# Patient Record
Sex: FEMALE | Race: WHITE | NOT HISPANIC OR LATINO | Employment: FULL TIME | URBAN - METROPOLITAN AREA
[De-identification: names, ages, dates, MRNs, and addresses within clinical notes are randomized per-mention and may not be internally consistent; named-entity substitution may affect disease eponyms.]

---

## 2018-08-21 ENCOUNTER — TRANSCRIBE ORDERS (OUTPATIENT)
Dept: LAB | Facility: HOSPITAL | Age: 23
End: 2018-08-21

## 2018-08-21 ENCOUNTER — LAB (OUTPATIENT)
Dept: LAB | Facility: HOSPITAL | Age: 23
End: 2018-08-21

## 2018-08-21 DIAGNOSIS — N93.9 ABNORMAL UTERINE BLEEDING (AUB): ICD-10-CM

## 2018-08-21 DIAGNOSIS — N93.9 ABNORMAL UTERINE BLEEDING (AUB): Primary | ICD-10-CM

## 2018-08-21 LAB
DEPRECATED RDW RBC AUTO: 42.6 FL (ref 37–54)
ERYTHROCYTE [DISTWIDTH] IN BLOOD BY AUTOMATED COUNT: 14.4 % (ref 11.3–14.5)
HCT VFR BLD AUTO: 38 % (ref 34.5–44)
HGB BLD-MCNC: 11.7 G/DL (ref 11.5–15.5)
MCH RBC QN AUTO: 24.7 PG (ref 27–31)
MCHC RBC AUTO-ENTMCNC: 30.8 G/DL (ref 32–36)
MCV RBC AUTO: 80.3 FL (ref 80–99)
PLATELET # BLD AUTO: 267 10*3/MM3 (ref 150–450)
PMV BLD AUTO: 12.3 FL (ref 6–12)
RBC # BLD AUTO: 4.73 10*6/MM3 (ref 3.89–5.14)
T4 FREE SERPL-MCNC: 0.97 NG/DL (ref 0.89–1.76)
TSH SERPL DL<=0.05 MIU/L-ACNC: 1.07 MIU/ML (ref 0.35–5.35)
WBC NRBC COR # BLD: 8.86 10*3/MM3 (ref 3.5–10.8)

## 2018-08-21 PROCEDURE — 84443 ASSAY THYROID STIM HORMONE: CPT

## 2018-08-21 PROCEDURE — 85027 COMPLETE CBC AUTOMATED: CPT | Performed by: PHYSICIAN ASSISTANT

## 2018-08-21 PROCEDURE — 36415 COLL VENOUS BLD VENIPUNCTURE: CPT | Performed by: PHYSICIAN ASSISTANT

## 2018-08-21 PROCEDURE — 84439 ASSAY OF FREE THYROXINE: CPT | Performed by: PHYSICIAN ASSISTANT

## 2018-10-03 ENCOUNTER — TELEPHONE (OUTPATIENT)
Dept: URGENT CARE | Facility: CLINIC | Age: 23
End: 2018-10-03

## 2018-10-22 ENCOUNTER — OFFICE VISIT (OUTPATIENT)
Dept: NEUROLOGY | Facility: CLINIC | Age: 23
End: 2018-10-22

## 2018-10-22 VITALS
DIASTOLIC BLOOD PRESSURE: 84 MMHG | HEIGHT: 63 IN | SYSTOLIC BLOOD PRESSURE: 127 MMHG | BODY MASS INDEX: 30.12 KG/M2 | WEIGHT: 170 LBS

## 2018-10-22 DIAGNOSIS — G43.019 INTRACTABLE MIGRAINE WITHOUT AURA AND WITHOUT STATUS MIGRAINOSUS: Primary | ICD-10-CM

## 2018-10-22 PROCEDURE — 99204 OFFICE O/P NEW MOD 45 MIN: CPT | Performed by: PSYCHIATRY & NEUROLOGY

## 2018-10-22 RX ORDER — SUMATRIPTAN 100 MG/1
TABLET, FILM COATED ORAL
Qty: 9 TABLET | Refills: 1 | Status: SHIPPED | OUTPATIENT
Start: 2018-10-22 | End: 2019-05-13 | Stop reason: SDUPTHER

## 2018-10-22 RX ORDER — VENLAFAXINE HYDROCHLORIDE 150 MG/1
CAPSULE, EXTENDED RELEASE ORAL
COMMUNITY
Start: 2018-09-07 | End: 2021-01-04

## 2018-10-22 RX ORDER — FLUTICASONE PROPIONATE 50 MCG
2 SPRAY, SUSPENSION (ML) NASAL DAILY
COMMUNITY
End: 2021-09-08 | Stop reason: SDUPTHER

## 2018-10-22 RX ORDER — ERGOCALCIFEROL (VITAMIN D2) 10 MCG
400 TABLET ORAL DAILY
COMMUNITY
End: 2021-06-23

## 2018-10-22 NOTE — PROGRESS NOTES
Subjective:    CC: Elisabeth Martinez is seen today in consultation at the request of Berry Maria,* for Migraine       HPI:  23-year-old female referred to the clinic for evaluation and management of migraines.  She reports that she started having headaches in January 2018 but in last 2 months, they've become more intense and frequent.  She reports that the headache starts in left frontotemporal region and around the eye or behind the eye on the left associated with light and sound sensitivity as well as nausea and vomiting.  She describes pain as a sharp shooting type of pain with maximum pain intensity being 6-7 out of 10.  Currently she is reporting approximately 10-12 headache days in a month.  She was taking Excedrin as needed and it was helping as an abortive therapy but it seems to have stopped working now.  She reports poor quality of sleep.  She is taking Effexor for mood and it helps.  She denies any specific triggers and denies aura prior to her headaches.  She hasn't had brain imaging done since onset of headaches in January 2018.  There is a family history of MS in her mother.    The following portions of the patient's history were reviewed today and updated as of 10/22/2018  : allergies, social history and problem list.  This document will be scanned to patient's chart.      Current Outpatient Prescriptions:   •  cetirizine (zyrTEC) 10 MG tablet, Take 10 mg by mouth Daily., Disp: , Rfl:   •  fluticasone (FLONASE) 50 MCG/ACT nasal spray, 2 sprays into the nostril(s) as directed by provider Daily., Disp: , Rfl:   •  metFORMIN ER (GLUCOPHAGE-XR) 750 MG 24 hr tablet, Take 750 mg by mouth Daily With Breakfast., Disp: , Rfl:   •  montelukast (SINGULAIR) 10 MG tablet, Take 10 mg by mouth Every Night., Disp: , Rfl:   •  venlafaxine XR (EFFEXOR-XR) 150 MG 24 hr capsule, , Disp: , Rfl:   •  Vitamin D, Cholecalciferol, (CHOLECALCIFEROL) 400 units tablet, Take 400 Units by mouth Daily., Disp: , Rfl:   •   "Erenumab-aooe (AIMOVIG) 70 MG/ML prefilled syringe, Inject 2 mL under the skin into the appropriate area as directed Every 30 (Thirty) Days., Disp: 2 mL, Rfl: 12  •  SUMAtriptan (IMITREX) 100 MG tablet, Take one tablet at onset of headache. May repeat dose one time in 2 hours if headache not relieved. ., Disp: 9 tablet, Rfl: 1   Past Medical History:   Diagnosis Date   • Anxiety    • Migraine    • PCOS (polycystic ovarian syndrome)       No past surgical history on file.   Family History   Problem Relation Age of Onset   • Seizures Mother    • Heart disease Maternal Grandmother    • Stroke Maternal Grandmother    • Cancer Maternal Grandfather    • Diabetes Maternal Grandfather       Review of Systems   Constitutional: Positive for fatigue.   Eyes: Positive for photophobia.   Respiratory: Positive for shortness of breath.    Cardiovascular: Negative.    Endocrine: Positive for heat intolerance.   Neurological: Positive for dizziness, weakness and headache.   Psychiatric/Behavioral: Positive for decreased concentration and sleep disturbance. The patient is nervous/anxious.        All other systems reviewed and are negative     Objective:    /84   Ht 160 cm (62.99\")   Wt 77.1 kg (170 lb)   BMI 30.12 kg/m²     Neurology Exam:    General apperance: NAD.     Mental status: Alert, awake and oriented to time place and person.    Recent and Remote memory: Can recall 3/3 objects at 5 minutes. Can recall historical events.     Attention span and Concentration: Serial 7s: Normal.     Fund of knowledge:  Normal.     Language and Speech: No aphasia or dysarthria.    Naming , Repitition and Comprehension:  Can name objects, repeat a sentence and follow commands. Speech is clear and fluent with good repetition, comprehension, and naming.    Cranial Nerves:   CN II: Visual fields are full. Intact. Fundi - Normal, No papillederma, Pupils - DENA  CN III, IV and VI: Extraocular movements are intact. Normal saccades.   CN V: " Facial sensation is intact.   CN VII: Muscles of facial expression reveal no asymmetry. Intact.   CN VIII: Hearing is intact. Whispered voice intact.   CN IX and X: Palate elevates symmetrically. Intact  CN XI: Shoulder shrug is intact.   CN XII: Tongue is midline without evidence of atrophy or fasciculation.     Motor:  Right UE muscle strength 5/5. Normal tone.     Left UE muscle strength 5/5. Normal tone.      Right LE muscle strength5/5. Normal tone.     Left LE muscle strength 5/5. Normal tone.      Sensory: Normal light touch, vibration and pinprick sensation bilaterally.    DTRs: 3+ bilaterally in upper and lower extremities.    Babinski: Negative bilaterally.    Co-ordination: Normal finger-to-nose, heel to shin B/L.    Rhomberg: Negative.    Gait: Normal.    Cardiovascular: Regular rate and rhythm without murmur, gallop or rub.    Assessment and Plan:  1. Intractable migraine without aura and without status migrainosus  She is currently reporting 10-12 headache days in a month.  Will initiate Aimovig 140 mg subcutaneous tenderness injections every month as a preventative therapy.  Imitrex 100 mg as needed as an abortive therapy.  MRI brain for further evaluation.  I also advised her to start taking melatonin 10 mg 1-2 hours before bedtime to see if it helps improve sleep quality or not.  I'll see her back in 4 weeks in follow-up.  - MRI Brain Without Contrast; Future       No Follow-up on file.     Jose Zaragoza MD

## 2018-11-12 ENCOUNTER — APPOINTMENT (OUTPATIENT)
Dept: MRI IMAGING | Facility: HOSPITAL | Age: 23
End: 2018-11-12
Attending: PSYCHIATRY & NEUROLOGY

## 2018-12-04 ENCOUNTER — TELEPHONE (OUTPATIENT)
Dept: NEUROLOGY | Facility: CLINIC | Age: 23
End: 2018-12-04

## 2018-12-04 NOTE — TELEPHONE ENCOUNTER
Called to notify patient her Aimovig approval letter came today. Phone continued to ring. No answer.

## 2019-03-29 ENCOUNTER — TELEPHONE (OUTPATIENT)
Dept: NEUROLOGY | Facility: CLINIC | Age: 24
End: 2019-03-29

## 2019-03-29 NOTE — TELEPHONE ENCOUNTER
Left voicemail for pt to call back.    -Pt needs to setup follow-up appointment because Aimovig was denied because we have no information on how she is doing on medication.

## 2019-05-13 ENCOUNTER — OFFICE VISIT (OUTPATIENT)
Dept: NEUROLOGY | Facility: CLINIC | Age: 24
End: 2019-05-13

## 2019-05-13 VITALS
WEIGHT: 174 LBS | SYSTOLIC BLOOD PRESSURE: 122 MMHG | BODY MASS INDEX: 30.83 KG/M2 | HEART RATE: 106 BPM | DIASTOLIC BLOOD PRESSURE: 76 MMHG | RESPIRATION RATE: 16 BRPM | HEIGHT: 63 IN | OXYGEN SATURATION: 98 %

## 2019-05-13 DIAGNOSIS — G43.019 INTRACTABLE MIGRAINE WITHOUT AURA AND WITHOUT STATUS MIGRAINOSUS: Primary | ICD-10-CM

## 2019-05-13 PROCEDURE — 99214 OFFICE O/P EST MOD 30 MIN: CPT | Performed by: PSYCHIATRY & NEUROLOGY

## 2019-05-13 RX ORDER — SUMATRIPTAN 100 MG/1
TABLET, FILM COATED ORAL
Qty: 9 TABLET | Refills: 3 | Status: SHIPPED | OUTPATIENT
Start: 2019-05-13 | End: 2019-07-10 | Stop reason: SDUPTHER

## 2019-05-13 NOTE — PROGRESS NOTES
Subjective:    CC: Elisabeth Martinez is in clinic today for follow up for migraines.    HPI:  She is in clinic for follow-up after initial visit back in October 2018.  At that time, Aimovig 140 mg subcutaneous injection was started as a preventative therapy for migraines.  She reports that with Aimovig, she did extremely well and headache intensity and frequency went down significantly.  She was experiencing approximately 10-15 headache days in a month and with Aimovig, it went down to 1 or sometimes 0 headaches in a month.  She tolerated Aimovig very well.  Her last Aimovig was on April 5, 2019 after which insurance stopped covering the medication as she did not come for neurology follow-up as per schedule.  She is requesting that Aimovig is restarted as her headaches are becoming worse.    The following portions of the patient's history were reviewed and updated as of 05/13/2019: allergies, social history and problem list.       Current Outpatient Medications:   •  cetirizine (zyrTEC) 10 MG tablet, Take 10 mg by mouth Daily., Disp: , Rfl:   •  fluticasone (FLONASE) 50 MCG/ACT nasal spray, 2 sprays into the nostril(s) as directed by provider Daily., Disp: , Rfl:   •  metFORMIN ER (GLUCOPHAGE-XR) 750 MG 24 hr tablet, Take 750 mg by mouth Daily With Breakfast., Disp: , Rfl:   •  montelukast (SINGULAIR) 10 MG tablet, Take 10 mg by mouth Every Night., Disp: , Rfl:   •  SUMAtriptan (IMITREX) 100 MG tablet, Take one tablet at onset of headache. May repeat dose one time in 2 hours if headache not relieved. ., Disp: 9 tablet, Rfl: 1  •  venlafaxine XR (EFFEXOR-XR) 150 MG 24 hr capsule, , Disp: , Rfl:   •  Vitamin D, Cholecalciferol, (CHOLECALCIFEROL) 400 units tablet, Take 400 Units by mouth Daily., Disp: , Rfl:   •  Erenumab-aooe 140 MG/ML solution auto-injector, Inject 1 mL under the skin into the appropriate area as directed Every 30 (Thirty) Days., Disp: 1.12 mL, Rfl: 11   Past Medical History:   Diagnosis Date   • Anxiety   "  • Migraine    • PCOS (polycystic ovarian syndrome)       No past surgical history on file.   Family History   Problem Relation Age of Onset   • Seizures Mother    • Heart disease Maternal Grandmother    • Stroke Maternal Grandmother    • Cancer Maternal Grandfather    • Diabetes Maternal Grandfather         Review of Systems   Neurological: Positive for headache.     Objective:    /76   Pulse 106   Resp 16   Ht 160 cm (62.99\")   Wt 78.9 kg (174 lb)   SpO2 98%   BMI 30.83 kg/m²     Neurology Exam:  General apperance: NAD.     Mental status: Alert, awake and oriented to time place and person.    Recent and Remote memory: Can recall 3/3 objects at 5 minutes. Can recall historical events.     Attention span and Concentration: Serial 7s: Normal.     Fund of knowledge:  Normal.     Language and Speech: No aphasia or dysarthria.    Naming , Repitition and Comprehension:  Can name objects, repeat a sentence and follow commands. Speech is clear and fluent with good repetition, comprehension, and naming.    CN II to XII: Intact.    Opthalmoscopic Exam: No papilledema.    Motor:  Right UE muscle strength 5/5. Normal tone.     Left UE muscle strength 5/5. Normal tone.      Right LE muscle strength5/5. Normal tone.     Left LE muscle strength 5/5. Normal tone.      Sensory: Normal light touch, vibration and pinprick sensation bilaterally.    DTRs: 2+ bilaterally.    Babinski: Negative bilaterally.    Co-ordination: Normal finger-to-nose, heel to shin B/L.    Rhomberg: Negative.    Gait: Normal.    Cardiovascular: Regular rate and rhythm without murmur, gallop or rub.    Assessment and Plan:  1. Intractable migraine without aura and without status migrainosus  She responded to very well to [id number] mg subcutaneous injections.  She was taking monthly injections until April after which insurance stopped covering the medication as she missed her neurology appointment.  She has done extremely well on Aimovig and it is " medically necessary that she continues this medication for migraine prevention.  Prior to Aimovig, she was getting 10-15 headaches in a month.  Since starting Aimovig, frequency has gone down to one headache in a month.  I will be sending a prescription for Aimovig 140 mg subcutaneous injection and will see her back in 6 to 8 weeks for follow-up.  Continue with sumatriptan 100 mg as needed as an abortive therapy as it has helped.    I spent 25 minutes face to face with the patient and spent 15  minutes of this time  in management, instructions and education regarding above mentioned diagnosis and also on counseling and discussing about taking medication regularly, possible side effects with medication use, importance of good sleep hygiene, good hydration and regular exercise.    Return in about 8 weeks (around 7/8/2019).

## 2019-05-14 ENCOUNTER — DOCUMENTATION (OUTPATIENT)
Dept: NEUROLOGY | Facility: CLINIC | Age: 24
End: 2019-05-14

## 2019-06-19 ENCOUNTER — TELEPHONE (OUTPATIENT)
Dept: NEUROLOGY | Facility: CLINIC | Age: 24
End: 2019-06-19

## 2019-06-19 NOTE — TELEPHONE ENCOUNTER
Mom, Karey called stating Elisabeth never had her MRI Brain w/o contrast and now it has . She is wondering if  could put in a new order for this when he gets a chance/gets back.    ,  Could you please put in a new MRI Brain w/o contrast for Elsiabeth? As well mother would like for you to be on the lookout for anything that might indicate MS on her scans once she has had this done as Karey has MS herself and states sometimes feels Elisabeth shows similar symptoms.

## 2019-06-28 DIAGNOSIS — G43.019 INTRACTABLE MIGRAINE WITHOUT AURA AND WITHOUT STATUS MIGRAINOSUS: Primary | ICD-10-CM

## 2019-07-02 ENCOUNTER — TELEPHONE (OUTPATIENT)
Dept: NEUROLOGY | Facility: CLINIC | Age: 24
End: 2019-07-02

## 2019-07-02 NOTE — TELEPHONE ENCOUNTER
Pt mother calling inquiring about why MRI has not been scheduled. Advised new orders were put in on Friday and CS has called pt twice to schedule and left voicemail b/c there was no answer. Transferred mother to CS.

## 2019-07-10 ENCOUNTER — OFFICE VISIT (OUTPATIENT)
Dept: NEUROLOGY | Facility: CLINIC | Age: 24
End: 2019-07-10

## 2019-07-10 VITALS
DIASTOLIC BLOOD PRESSURE: 78 MMHG | BODY MASS INDEX: 30.83 KG/M2 | HEIGHT: 63 IN | SYSTOLIC BLOOD PRESSURE: 124 MMHG | WEIGHT: 174 LBS

## 2019-07-10 DIAGNOSIS — G43.019 INTRACTABLE MIGRAINE WITHOUT AURA AND WITHOUT STATUS MIGRAINOSUS: Primary | ICD-10-CM

## 2019-07-10 PROCEDURE — 99213 OFFICE O/P EST LOW 20 MIN: CPT | Performed by: PSYCHIATRY & NEUROLOGY

## 2019-07-10 RX ORDER — SUMATRIPTAN 100 MG/1
TABLET, FILM COATED ORAL
Qty: 9 TABLET | Refills: 3 | Status: SHIPPED | OUTPATIENT
Start: 2019-07-10 | End: 2019-08-21 | Stop reason: SDUPTHER

## 2019-07-10 NOTE — PROGRESS NOTES
Subjective:    CC: Elisabeth Martinez is in clinic today for follow up for chronic migraines.    HPI:  She is in clinic for regular follow-up.  Since her last visit, her insurance did approve Aimovig 140 mg subcutaneous injection.  She has done total of 2 injections since her last visit and reports that she is doing really well.  She has had only one headache in the last 2 months for recheck to take sumatriptan.  She is tolerating Aimovig well without any side effects.    The following portions of the patient's history were reviewed and updated as of 07/10/2019: allergies, social history and problem list.       Current Outpatient Medications:   •  cetirizine (zyrTEC) 10 MG tablet, Take 10 mg by mouth Daily., Disp: , Rfl:   •  Erenumab-aooe 140 MG/ML solution auto-injector, Inject 1 mL under the skin into the appropriate area as directed Every 30 (Thirty) Days., Disp: 1.12 mL, Rfl: 11  •  fluticasone (FLONASE) 50 MCG/ACT nasal spray, 2 sprays into the nostril(s) as directed by provider Daily., Disp: , Rfl:   •  metFORMIN ER (GLUCOPHAGE-XR) 750 MG 24 hr tablet, Take 750 mg by mouth Daily With Breakfast., Disp: , Rfl:   •  montelukast (SINGULAIR) 10 MG tablet, Take 10 mg by mouth Every Night., Disp: , Rfl:   •  SUMAtriptan (IMITREX) 100 MG tablet, ., Disp: 9 tablet, Rfl: 3  •  venlafaxine XR (EFFEXOR-XR) 150 MG 24 hr capsule, , Disp: , Rfl:   •  Vitamin D, Cholecalciferol, (CHOLECALCIFEROL) 400 units tablet, Take 400 Units by mouth Daily., Disp: , Rfl:    Past Medical History:   Diagnosis Date   • Anxiety    • Migraine    • PCOS (polycystic ovarian syndrome)       History reviewed. No pertinent surgical history.   Family History   Problem Relation Age of Onset   • Seizures Mother    • Heart disease Maternal Grandmother    • Stroke Maternal Grandmother    • Cancer Maternal Grandfather    • Diabetes Maternal Grandfather         Review of Systems   Constitutional: Positive for fatigue.   Genitourinary: Positive for difficulty  "urinating.   Neurological: Positive for dizziness, speech difficulty, weakness and numbness.     Objective:    /78   Ht 160 cm (62.99\")   Wt 78.9 kg (174 lb)   BMI 30.83 kg/m²     Neurology Exam:  General apperance: NAD.     Mental status: Alert, awake and oriented to time place and person.    Recent and Remote memory: Can recall 3/3 objects at 5 minutes. Can recall historical events.     Attention span and Concentration: Serial 7s: Normal.     Fund of knowledge:  Normal.     Language and Speech: No aphasia or dysarthria.    Naming , Repitition and Comprehension:  Can name objects, repeat a sentence and follow commands. Speech is clear and fluent with good repetition, comprehension, and naming.    CN II to XII: Intact.    Opthalmoscopic Exam: No papilledema.    Motor:  Right UE muscle strength 5/5. Normal tone.     Left UE muscle strength 5/5. Normal tone.      Right LE muscle strength5/5. Normal tone.     Left LE muscle strength 5/5. Normal tone.      Sensory: Normal light touch, vibration and pinprick sensation bilaterally.    DTRs: 2+ bilaterally.    Babinski: Negative bilaterally.    Co-ordination: Normal finger-to-nose, heel to shin B/L.    Rhomberg: Negative.    Gait: Normal.    Cardiovascular: Regular rate and rhythm without murmur, gallop or rub.    Assessment and Plan:  1. Intractable migraine without aura and without status migrainosus  She continues to respond well to Aimovig 140 mg subcutaneous once a month injection.  She has had only one headache in the last 2 months.  She denies any side effects with Aimovig use.  Imitrex 100 mg working well as an abortive therapy as well.  Continue with current combination.  I will see her back in 6 months for follow-up.    I spent 15 minutes face to face with the patient and spent 10 minutes of this time  in management, instructions and education regarding above mentioned diagnosis and also on counseling and discussing about taking medication regularly, " possible side effects with medication use, importance of good sleep hygiene, good hydration and regular exercise.    Return in about 6 months (around 1/10/2020).

## 2019-07-19 ENCOUNTER — HOSPITAL ENCOUNTER (OUTPATIENT)
Dept: MRI IMAGING | Facility: HOSPITAL | Age: 24
Discharge: HOME | End: 2019-07-19
Admitting: PSYCHIATRY & NEUROLOGY

## 2019-07-19 DIAGNOSIS — G43.019 INTRACTABLE MIGRAINE WITHOUT AURA AND WITHOUT STATUS MIGRAINOSUS: ICD-10-CM

## 2019-07-19 PROCEDURE — 70551 MRI BRAIN STEM W/O DYE: CPT

## 2019-07-24 ENCOUNTER — TELEPHONE (OUTPATIENT)
Dept: NEUROLOGY | Facility: CLINIC | Age: 24
End: 2019-07-24

## 2019-07-24 DIAGNOSIS — E23.6 PITUITARY CYST (HCC): Primary | ICD-10-CM

## 2019-07-24 NOTE — TELEPHONE ENCOUNTER
----- Message from Jose Zaragoza MD sent at 7/24/2019 10:30 AM EDT -----  Please call the patient regarding her abnormal result.  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  Usually both these cysts are benign and present since birth but radiologist has recommended further evaluation with MRI of pituitary gland so I am ordering it.

## 2019-07-24 NOTE — TELEPHONE ENCOUNTER
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  Usually both these cysts are benign and present since birth but radiologist has recommended further evaluation with MRI of pituitary gland so we have put in an order for it. Advised pt that CS will call to schedule. Pt acknowledged understanding without any questions or concerns.

## 2019-08-01 ENCOUNTER — TELEPHONE (OUTPATIENT)
Dept: NEUROLOGY | Facility: CLINIC | Age: 24
End: 2019-08-01

## 2019-08-01 NOTE — TELEPHONE ENCOUNTER
ENOCH CALLED AND STATES SHE RECEIVED A LETTER FROM On license of [facility] THAT THE SECOND MRI IS NOT APPROVED DUE TO SIMILAR STUDY WAS DONE WITHIN 60 DAYS. PROVIDER DID NOT STATE WHY SHE NEEDS THIS TEST. PLEASE CALL PATIENTS MOTHER ENOCH [phone]. THANK YOU

## 2019-08-01 NOTE — TELEPHONE ENCOUNTER
Per [employer] it was denied and a peer to peer needs to be done to get approved by calling [employer] [phone] and provide the member id [id number]

## 2019-08-06 ENCOUNTER — TELEPHONE (OUTPATIENT)
Dept: NEUROLOGY | Facility: CLINIC | Age: 24
End: 2019-08-06

## 2019-08-06 NOTE — TELEPHONE ENCOUNTER
Left VM informing pt that at this time we would not be doing peer to peer for another MRI. Advised that  suggest we wait since pt is overall doing well on Aimovig and that we could do test again in  A few months to make sure cyst does not grow and that we can rule it was present at birth. Advised to call us back if any further questions or concerns.

## 2019-08-07 ENCOUNTER — TELEPHONE (OUTPATIENT)
Dept: NEUROLOGY | Facility: CLINIC | Age: 24
End: 2019-08-07

## 2019-08-07 NOTE — TELEPHONE ENCOUNTER
Called pt mother back and informed her that  states since pt is doing so well on Aimovig then we will cancel Mri for now. Pt mother concerned because of her symptoms outside of the migraines. Pt mother concerned with the cyst that was found, advised that per  that those can be present at birth. Pt states that were concerned with the white matter lesions that they read about on Mri results via [employer], advised that white matter is common in patients with migraines. Pt mother would like to know if we could order another MRI in about 3 months to make sure the cyst has not grown. Please advise.

## 2019-08-07 NOTE — TELEPHONE ENCOUNTER
----- Message from Ludy Ramos sent at 8/7/2019  1:19 PM EDT -----  Contact: KAREY FONG (PT'S MOTHER)  Nik    PT's mother, Karey, called and asked to speak with Sylvester. Karey would like to discuss Elisabeth's second MRI. She states the second MRI was ordered, but insurance denied the imaging and deemed it not medically necessary. PT was initially told that Dr. Zaragoza would reach out to the insurance company in an attempt to get the imaging approved. However, now Karey and Elisabeth (PT) are both very irritated because Elisabeth received a message informing her that Dr. Zaragoza is going to put MRI off for now. Both parties involved would very much like a second MRI conducted as the results of the first were concerning.      Please call Karey back: [phone]

## 2019-08-12 NOTE — TELEPHONE ENCOUNTER
Yes, as you have reassured, there is nothing to worry and we can always repeat scan in 3 to 6 months but as you mentioned to her, none of her MRI changes are worrisome.

## 2019-08-13 NOTE — TELEPHONE ENCOUNTER
Spoke with Elisabeth and relayed 's last message. They would really like repeat MRI done in 3 months. I have set a reminder and I will make sure Mri is ordered at that time. Elisabeth states she will speak with mom and inform her.

## 2019-08-22 RX ORDER — SUMATRIPTAN 100 MG/1
TABLET, FILM COATED ORAL
Qty: 9 TABLET | Refills: 3 | Status: SHIPPED | OUTPATIENT
Start: 2019-08-22 | End: 2020-05-06 | Stop reason: SDUPTHER

## 2019-08-27 ENCOUNTER — PRIOR AUTHORIZATION (OUTPATIENT)
Dept: NEUROLOGY | Facility: CLINIC | Age: 24
End: 2019-08-27

## 2019-10-18 ENCOUNTER — TELEPHONE (OUTPATIENT)
Dept: NEUROLOGY | Facility: CLINIC | Age: 24
End: 2019-10-18

## 2019-10-18 DIAGNOSIS — E23.6 PITUITARY CYST (HCC): Primary | ICD-10-CM

## 2019-10-18 NOTE — TELEPHONE ENCOUNTER
Pt requesting new Referral for MRI of pituitary with and without contrast to see if it will be approved? It has been 3 months.  Please advise.

## 2019-11-11 ENCOUNTER — HOSPITAL ENCOUNTER (OUTPATIENT)
Dept: MRI IMAGING | Facility: HOSPITAL | Age: 24
Discharge: HOME | End: 2019-11-11
Admitting: PSYCHIATRY & NEUROLOGY

## 2019-11-11 DIAGNOSIS — E23.6 PITUITARY CYST (HCC): ICD-10-CM

## 2019-11-11 PROCEDURE — A9577 INJ MULTIHANCE: HCPCS | Performed by: PSYCHIATRY & NEUROLOGY

## 2019-11-11 PROCEDURE — 0 GADOBENATE DIMEGLUMINE 529 MG/ML SOLUTION: Performed by: PSYCHIATRY & NEUROLOGY

## 2019-11-11 PROCEDURE — 70553 MRI BRAIN STEM W/O & W/DYE: CPT

## 2019-11-11 RX ADMIN — GADOBENATE DIMEGLUMINE 15 ML: 529 INJECTION, SOLUTION INTRAVENOUS at 15:29

## 2019-11-14 ENCOUNTER — TELEPHONE (OUTPATIENT)
Dept: NEUROLOGY | Facility: CLINIC | Age: 24
End: 2019-11-14

## 2019-11-14 NOTE — TELEPHONE ENCOUNTER
Pt returned call regarding vm. Informed pt of MRI results per JONAH Phan. Informed pt per Dr. 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 Pt stated verbal understanding. Thanks.

## 2019-11-14 NOTE — TELEPHONE ENCOUNTER
----- Message from Jose Zaragoza MD sent at 11/13/2019  4:08 PM EST -----  Inform patient normal.  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  Both are benign and there is nothing to worry.

## 2019-11-14 NOTE — TELEPHONE ENCOUNTER
Left VM fr pt to callback.    -pt need to know that MRI was normal. 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

## 2020-02-26 ENCOUNTER — HOSPITAL ENCOUNTER (EMERGENCY)
Facility: HOSPITAL | Age: 25
Discharge: HOME | End: 2020-02-27
Attending: EMERGENCY MEDICINE | Admitting: EMERGENCY MEDICINE

## 2020-02-26 ENCOUNTER — APPOINTMENT (OUTPATIENT)
Dept: CT IMAGING | Facility: HOSPITAL | Age: 25
End: 2020-02-26

## 2020-02-26 DIAGNOSIS — N20.0 KIDNEY STONE ON LEFT SIDE: Primary | ICD-10-CM

## 2020-02-26 DIAGNOSIS — R10.32 LEFT LOWER QUADRANT ABDOMINAL PAIN: ICD-10-CM

## 2020-02-26 DIAGNOSIS — R31.9 HEMATURIA, UNSPECIFIED TYPE: ICD-10-CM

## 2020-02-26 DIAGNOSIS — R10.9 ACUTE LEFT FLANK PAIN: ICD-10-CM

## 2020-02-26 LAB
ALBUMIN SERPL-MCNC: 4 G/DL (ref 3.5–5.2)
ALBUMIN/GLOB SERPL: 1.2 G/DL
ALP SERPL-CCNC: 80 U/L (ref 39–117)
ALT SERPL W P-5'-P-CCNC: 18 U/L (ref 1–33)
ANION GAP SERPL CALCULATED.3IONS-SCNC: 13 MMOL/L (ref 5–15)
AST SERPL-CCNC: 16 U/L (ref 1–32)
B-HCG UR QL: NEGATIVE
BACTERIA UR QL AUTO: ABNORMAL /HPF
BASOPHILS # BLD AUTO: 0.06 10*3/MM3 (ref 0–0.2)
BASOPHILS NFR BLD AUTO: 0.4 % (ref 0–1.5)
BILIRUB SERPL-MCNC: <0.2 MG/DL (ref 0.2–1.2)
BILIRUB UR QL STRIP: NEGATIVE
BUN BLD-MCNC: 11 MG/DL (ref 6–20)
BUN/CREAT SERPL: 13.8 (ref 7–25)
CALCIUM SPEC-SCNC: 9.1 MG/DL (ref 8.6–10.5)
CHLORIDE SERPL-SCNC: 103 MMOL/L (ref 98–107)
CLARITY UR: CLEAR
CO2 SERPL-SCNC: 24 MMOL/L (ref 22–29)
COLOR UR: YELLOW
CREAT BLD-MCNC: 0.8 MG/DL (ref 0.57–1)
DEPRECATED RDW RBC AUTO: 40.7 FL (ref 37–54)
EOSINOPHIL # BLD AUTO: 0.13 10*3/MM3 (ref 0–0.4)
EOSINOPHIL NFR BLD AUTO: 0.9 % (ref 0.3–6.2)
ERYTHROCYTE [DISTWIDTH] IN BLOOD BY AUTOMATED COUNT: 13.6 % (ref 12.3–15.4)
GFR SERPL CREATININE-BSD FRML MDRD: 88 ML/MIN/1.73
GLOBULIN UR ELPH-MCNC: 3.3 GM/DL
GLUCOSE BLD-MCNC: 105 MG/DL (ref 65–99)
GLUCOSE UR STRIP-MCNC: NEGATIVE MG/DL
HCT VFR BLD AUTO: 36.6 % (ref 34–46.6)
HGB BLD-MCNC: 11.7 G/DL (ref 12–15.9)
HGB UR QL STRIP.AUTO: ABNORMAL
HYALINE CASTS UR QL AUTO: ABNORMAL /LPF
IMM GRANULOCYTES # BLD AUTO: 0.05 10*3/MM3 (ref 0–0.05)
IMM GRANULOCYTES NFR BLD AUTO: 0.3 % (ref 0–0.5)
KETONES UR QL STRIP: ABNORMAL
LEUKOCYTE ESTERASE UR QL STRIP.AUTO: ABNORMAL
LYMPHOCYTES # BLD AUTO: 3.31 10*3/MM3 (ref 0.7–3.1)
LYMPHOCYTES NFR BLD AUTO: 23.1 % (ref 19.6–45.3)
MCH RBC QN AUTO: 26.2 PG (ref 26.6–33)
MCHC RBC AUTO-ENTMCNC: 32 G/DL (ref 31.5–35.7)
MCV RBC AUTO: 81.9 FL (ref 79–97)
MONOCYTES # BLD AUTO: 1.01 10*3/MM3 (ref 0.1–0.9)
MONOCYTES NFR BLD AUTO: 7.1 % (ref 5–12)
NEUTROPHILS # BLD AUTO: 9.74 10*3/MM3 (ref 1.7–7)
NEUTROPHILS NFR BLD AUTO: 68.2 % (ref 42.7–76)
NITRITE UR QL STRIP: NEGATIVE
NRBC BLD AUTO-RTO: 0 /100 WBC (ref 0–0.2)
PH UR STRIP.AUTO: 7 [PH] (ref 5–8)
PLATELET # BLD AUTO: 289 10*3/MM3 (ref 140–450)
PMV BLD AUTO: 11.8 FL (ref 6–12)
POTASSIUM BLD-SCNC: 3.9 MMOL/L (ref 3.5–5.2)
PROT SERPL-MCNC: 7.3 G/DL (ref 6–8.5)
PROT UR QL STRIP: NEGATIVE
RBC # BLD AUTO: 4.47 10*6/MM3 (ref 3.77–5.28)
RBC # UR: ABNORMAL /HPF
REF LAB TEST METHOD: ABNORMAL
SODIUM BLD-SCNC: 140 MMOL/L (ref 136–145)
SP GR UR STRIP: 1.02 (ref 1–1.03)
SQUAMOUS #/AREA URNS HPF: ABNORMAL /HPF
UROBILINOGEN UR QL STRIP: ABNORMAL
WBC NRBC COR # BLD: 14.3 10*3/MM3 (ref 3.4–10.8)
WBC UR QL AUTO: ABNORMAL /HPF

## 2020-02-26 PROCEDURE — 74176 CT ABD & PELVIS W/O CONTRAST: CPT

## 2020-02-26 PROCEDURE — 81025 URINE PREGNANCY TEST: CPT | Performed by: EMERGENCY MEDICINE

## 2020-02-26 PROCEDURE — 81001 URINALYSIS AUTO W/SCOPE: CPT

## 2020-02-26 PROCEDURE — 85025 COMPLETE CBC W/AUTO DIFF WBC: CPT | Performed by: EMERGENCY MEDICINE

## 2020-02-26 PROCEDURE — 80053 COMPREHEN METABOLIC PANEL: CPT | Performed by: EMERGENCY MEDICINE

## 2020-02-26 PROCEDURE — 96375 TX/PRO/DX INJ NEW DRUG ADDON: CPT

## 2020-02-26 PROCEDURE — 96374 THER/PROPH/DIAG INJ IV PUSH: CPT

## 2020-02-26 PROCEDURE — 99283 EMERGENCY DEPT VISIT LOW MDM: CPT

## 2020-02-26 RX ORDER — FLUOXETINE HYDROCHLORIDE 20 MG/1
40 CAPSULE ORAL DAILY
COMMUNITY
End: 2021-06-23

## 2020-02-27 VITALS
RESPIRATION RATE: 16 BRPM | SYSTOLIC BLOOD PRESSURE: 121 MMHG | BODY MASS INDEX: 29.23 KG/M2 | DIASTOLIC BLOOD PRESSURE: 77 MMHG | OXYGEN SATURATION: 99 % | TEMPERATURE: 98.7 F | WEIGHT: 165 LBS | HEIGHT: 63 IN | HEART RATE: 87 BPM

## 2020-02-27 PROCEDURE — 96374 THER/PROPH/DIAG INJ IV PUSH: CPT

## 2020-02-27 PROCEDURE — 25010000002 KETOROLAC TROMETHAMINE PER 15 MG: Performed by: EMERGENCY MEDICINE

## 2020-02-27 PROCEDURE — 96375 TX/PRO/DX INJ NEW DRUG ADDON: CPT

## 2020-02-27 PROCEDURE — 25010000002 MORPHINE PER 10 MG: Performed by: EMERGENCY MEDICINE

## 2020-02-27 RX ORDER — NAPROXEN 250 MG/1
500 TABLET ORAL ONCE
Status: DISCONTINUED | OUTPATIENT
Start: 2020-02-27 | End: 2020-02-27

## 2020-02-27 RX ORDER — TAMSULOSIN HYDROCHLORIDE 0.4 MG/1
1 CAPSULE ORAL DAILY
Qty: 10 CAPSULE | Refills: 0 | Status: SHIPPED | OUTPATIENT
Start: 2020-02-27 | End: 2021-01-04

## 2020-02-27 RX ORDER — OXYCODONE HYDROCHLORIDE AND ACETAMINOPHEN 5; 325 MG/1; MG/1
1-2 TABLET ORAL EVERY 6 HOURS PRN
Qty: 12 TABLET | Refills: 0 | Status: SHIPPED | OUTPATIENT
Start: 2020-02-27 | End: 2021-01-04

## 2020-02-27 RX ORDER — ONDANSETRON 4 MG/1
4 TABLET, FILM COATED ORAL EVERY 6 HOURS PRN
Qty: 8 TABLET | Refills: 0 | Status: SHIPPED | OUTPATIENT
Start: 2020-02-27 | End: 2021-05-06

## 2020-02-27 RX ORDER — KETOROLAC TROMETHAMINE 15 MG/ML
15 INJECTION, SOLUTION INTRAMUSCULAR; INTRAVENOUS ONCE
Status: COMPLETED | OUTPATIENT
Start: 2020-02-27 | End: 2020-02-27

## 2020-02-27 RX ORDER — NAPROXEN 500 MG/1
500 TABLET ORAL 2 TIMES DAILY PRN
Qty: 12 TABLET | Refills: 0 | Status: SHIPPED | OUTPATIENT
Start: 2020-02-27 | End: 2021-01-04

## 2020-02-27 RX ORDER — MORPHINE SULFATE 4 MG/ML
4 INJECTION, SOLUTION INTRAMUSCULAR; INTRAVENOUS ONCE
Status: COMPLETED | OUTPATIENT
Start: 2020-02-27 | End: 2020-02-27

## 2020-02-27 RX ADMIN — MORPHINE SULFATE 4 MG: 4 INJECTION INTRAVENOUS at 01:51

## 2020-02-27 RX ADMIN — KETOROLAC TROMETHAMINE 15 MG: 15 INJECTION, SOLUTION INTRAMUSCULAR; INTRAVENOUS at 01:51

## 2020-02-27 RX ADMIN — SODIUM CHLORIDE 1000 ML: 9 INJECTION, SOLUTION INTRAVENOUS at 01:49

## 2020-05-06 RX ORDER — SUMATRIPTAN 100 MG/1
TABLET, FILM COATED ORAL
Qty: 9 TABLET | Refills: 3 | Status: SHIPPED | OUTPATIENT
Start: 2020-05-06 | End: 2021-05-06 | Stop reason: SDUPTHER

## 2020-05-07 ENCOUNTER — TELEPHONE (OUTPATIENT)
Dept: NEUROLOGY | Facility: CLINIC | Age: 25
End: 2020-05-07

## 2020-05-07 NOTE — TELEPHONE ENCOUNTER
PATIENT NOTIFIED THAT THE MEDICATION IS AVAILABLE WITH AUTHORIZATION AND SHE CAN GET AN AIMOVIG ACCESS CARD AND PAY AS LITTLE AS $5.00 FOR UP TO 12 DOSES. I ADVISED PATIENT TO CALL BACK IF SHE HAD ANY QUESTIONS

## 2020-05-07 NOTE — TELEPHONE ENCOUNTER
[facility]  [facility]  [phone]    REF # [id number]    REP CALLED WITH QUESTIONS ABOUT AMOVIG 140MG AUTO INJECTOR PA

## 2020-07-23 PROCEDURE — U0003 INFECTIOUS AGENT DETECTION BY NUCLEIC ACID (DNA OR RNA); SEVERE ACUTE RESPIRATORY SYNDROME CORONAVIRUS 2 (SARS-COV-2) (CORONAVIRUS DISEASE [COVID-19]), AMPLIFIED PROBE TECHNIQUE, MAKING USE OF HIGH THROUGHPUT TECHNOLOGIES AS DESCRIBED BY CMS-2020-01-R: HCPCS | Performed by: FAMILY MEDICINE

## 2021-01-04 ENCOUNTER — OFFICE VISIT (OUTPATIENT)
Dept: NEUROLOGY | Facility: CLINIC | Age: 26
End: 2021-01-04

## 2021-01-04 VITALS
HEART RATE: 124 BPM | BODY MASS INDEX: 33.66 KG/M2 | DIASTOLIC BLOOD PRESSURE: 88 MMHG | WEIGHT: 190 LBS | TEMPERATURE: 97.3 F | OXYGEN SATURATION: 99 % | HEIGHT: 63 IN | SYSTOLIC BLOOD PRESSURE: 122 MMHG

## 2021-01-04 DIAGNOSIS — G43.019 INTRACTABLE MIGRAINE WITHOUT AURA AND WITHOUT STATUS MIGRAINOSUS: Primary | ICD-10-CM

## 2021-01-04 PROCEDURE — 99214 OFFICE O/P EST MOD 30 MIN: CPT | Performed by: PSYCHIATRY & NEUROLOGY

## 2021-01-04 RX ORDER — ERENUMAB-AOOE 140 MG/ML
140 INJECTION, SOLUTION SUBCUTANEOUS
Qty: 1.12 ML | Refills: 11 | Status: SHIPPED | OUTPATIENT
Start: 2021-01-04 | End: 2021-07-30 | Stop reason: SDUPTHER

## 2021-01-04 NOTE — PROGRESS NOTES
"Subjective:    CC: Elisabeth Martinez is in clinic today for follow up for      HPI:  ***    The following portions of the patient's history were reviewed and updated as of 01/04/2021: {history reviewed:93411::\"allergies\",\"social history\",\"problem list\"}.       Current Outpatient Medications:   •  cetirizine (zyrTEC) 10 MG tablet, Take 10 mg by mouth Daily., Disp: , Rfl:   •  FLUoxetine (PROzac) 20 MG capsule, Take 40 mg by mouth Daily., Disp: , Rfl:   •  fluticasone (FLONASE) 50 MCG/ACT nasal spray, 2 sprays into the nostril(s) as directed by provider Daily., Disp: , Rfl:   •  metFORMIN ER (GLUCOPHAGE-XR) 750 MG 24 hr tablet, Take 750 mg by mouth Daily With Breakfast., Disp: , Rfl:   •  montelukast (SINGULAIR) 10 MG tablet, Take 10 mg by mouth Every Night., Disp: , Rfl:   •  ondansetron (ZOFRAN) 4 MG tablet, Take 1 tablet by mouth Every 6 (Six) Hours As Needed for Nausea or Vomiting., Disp: 8 tablet, Rfl: 0  •  SUMAtriptan (IMITREX) 100 MG tablet, ., Disp: 9 tablet, Rfl: 3  •  Vitamin D, Cholecalciferol, (CHOLECALCIFEROL) 400 units tablet, Take 400 Units by mouth Daily., Disp: , Rfl:    Past Medical History:   Diagnosis Date   • Anxiety    • Migraine    • PCOS (polycystic ovarian syndrome)       History reviewed. No pertinent surgical history.   Family History   Problem Relation Age of Onset   • Seizures Mother    • Heart disease Maternal Grandmother    • Stroke Maternal Grandmother    • Cancer Maternal Grandfather    • Diabetes Maternal Grandfather         Review of Systems   Constitutional: Positive for fatigue and unexpected weight gain.   HENT: Negative.    Eyes: Negative.    Respiratory: Negative.    Cardiovascular: Negative.    Gastrointestinal: Positive for diarrhea.   Endocrine: Negative.    Genitourinary: Positive for vaginal bleeding.   Musculoskeletal: Negative.    Skin: Negative.    Allergic/Immunologic: Negative.    Neurological: Positive for dizziness, weakness, light-headedness, numbness and headache. " "  Hematological: Negative.    Psychiatric/Behavioral: The patient is nervous/anxious.      Objective:    /88   Pulse (!) 124   Temp 97.3 °F (36.3 °C)   Ht 160 cm (62.99\")   Wt 86.2 kg (190 lb)   SpO2 99%   BMI 33.67 kg/m²     Neurology Exam:  General apperance: NAD.     Mental status: Alert, awake and oriented to time place and person.    Recent and Remote memory: Can recall 3/3 objects at 5 minutes. Can recall historical events.     Attention span and Concentration: Serial 7s: Normal.     Fund of knowledge:  Normal.     Language and Speech: No aphasia or dysarthria.    Naming , Repitition and Comprehension:  Can name objects, repeat a sentence and follow commands. Speech is clear and fluent with good repetition, comprehension, and naming.    CN II to XII: Intact.    Opthalmoscopic Exam: No papilledema.    Motor:  Right UE muscle strength 5/5. Normal tone.     Left UE muscle strength 5/5. Normal tone.      Right LE muscle strength5/5. Normal tone.     Left LE muscle strength 5/5. Normal tone.      Sensory: Normal light touch, vibration and pinprick sensation bilaterally.    DTRs: 2+ bilaterally.    Babinski: Negative bilaterally.    Co-ordination: Normal finger-to-nose, heel to shin B/L.    Rhomberg: Negative.    Gait: Normal.    Cardiovascular: Regular rate and rhythm without murmur, gallop or rub.    Assessment and Plan:  1. Intractable migraine without aura and without status migrainosus  ***       I spent 25 minutes face to face with the patient and spent more than 50% of this time  in management, instructions and education regarding above mentioned diagnosis and also on counseling and discussing about taking medication regularly, possible side effects with medication use, importance of good sleep hygiene, good hydration and regular exercise.    Return in about 6 months (around 7/4/2021).       "

## 2021-01-04 NOTE — PROGRESS NOTES
Subjective:    CC: Elisabeth Martinez is in clinic today for follow up for      HPI:  Follow-up: 7/10/2019: She is in clinic for regular follow-up.  Since her last visit, her insurance did approve Aimovig 140 mg subcutaneous injection.  She has done total of 2 injections since her last visit and reports that she is doing really well.  She has had only one headache in the last 2 months for recheck to take sumatriptan.  She is tolerating Aimovig well without any side effects.  Follow-up: 1/4/2021: She is in clinic for follow-up after July 2019.  She reports that she ran out of her Aimovig in August 2020 and her primary care physician's office could not refill it so since then she is not taking.  Since running out of Aimovig in August, she reports that the migraine intensity and frequency have increased and currently she is experiencing 8-9 breakthrough migraine in a month.  While on Aimovig, it would reduce to 1-2 migraines in a month.  She is currently taking sumatriptan 100 mg as needed as an abortive treatment and it is working well.  She wants to go back on Aimovig.      The following portions of the patient's history were reviewed and updated as of 01/04/2021: allergies, social history and problem list.       Current Outpatient Medications:   •  cetirizine (zyrTEC) 10 MG tablet, Take 10 mg by mouth Daily., Disp: , Rfl:   •  FLUoxetine (PROzac) 20 MG capsule, Take 40 mg by mouth Daily., Disp: , Rfl:   •  fluticasone (FLONASE) 50 MCG/ACT nasal spray, 2 sprays into the nostril(s) as directed by provider Daily., Disp: , Rfl:   •  metFORMIN ER (GLUCOPHAGE-XR) 750 MG 24 hr tablet, Take 750 mg by mouth Daily With Breakfast., Disp: , Rfl:   •  montelukast (SINGULAIR) 10 MG tablet, Take 10 mg by mouth Every Night., Disp: , Rfl:   •  ondansetron (ZOFRAN) 4 MG tablet, Take 1 tablet by mouth Every 6 (Six) Hours As Needed for Nausea or Vomiting., Disp: 8 tablet, Rfl: 0  •  SUMAtriptan (IMITREX) 100 MG tablet, ., Disp: 9 tablet,  "Rfl: 3  •  Vitamin D, Cholecalciferol, (CHOLECALCIFEROL) 400 units tablet, Take 400 Units by mouth Daily., Disp: , Rfl:   •  Erenumab-aooe (Aimovig) 140 MG/ML prefilled syringe, Inject 1 mL under the skin into the appropriate area as directed Every 30 (Thirty) Days., Disp: 1.12 mL, Rfl: 11   Past Medical History:   Diagnosis Date   • Anxiety    • Migraine    • PCOS (polycystic ovarian syndrome)       History reviewed. No pertinent surgical history.   Family History   Problem Relation Age of Onset   • Seizures Mother    • Heart disease Maternal Grandmother    • Stroke Maternal Grandmother    • Cancer Maternal Grandfather    • Diabetes Maternal Grandfather         Review of Systems  Objective:    /88   Pulse (!) 124   Temp 97.3 °F (36.3 °C)   Ht 160 cm (62.99\")   Wt 86.2 kg (190 lb)   SpO2 99%   BMI 33.67 kg/m²     Neurology Exam:  General apperance: NAD.     Mental status: Alert, awake and oriented to time place and person.    Recent and Remote memory: Can recall 3/3 objects at 5 minutes. Can recall historical events.     Attention span and Concentration: Serial 7s: Normal.     Fund of knowledge:  Normal.     Language and Speech: No aphasia or dysarthria.    Naming , Repitition and Comprehension:  Can name objects, repeat a sentence and follow commands. Speech is clear and fluent with good repetition, comprehension, and naming.    CN II to XII: Intact.    Opthalmoscopic Exam: No papilledema.    Motor:  Right UE muscle strength 5/5. Normal tone.     Left UE muscle strength 5/5. Normal tone.      Right LE muscle strength5/5. Normal tone.     Left LE muscle strength 5/5. Normal tone.      Sensory: Normal light touch, vibration and pinprick sensation bilaterally.    DTRs: 2+ bilaterally.    Babinski: Negative bilaterally.    Co-ordination: Normal finger-to-nose, heel to shin B/L.    Rhomberg: Negative.    Gait: Normal.    Cardiovascular: Regular rate and rhythm without murmur, gallop or rub.    Assessment and " Plan:  1. Intractable migraine without aura and without status migrainosus  -She ran out of Aimovig in August and since then she has noted increasing frequency and intensity of migraines.  I will restart her on Aimovig as she was doing really well and migraine frequency went down to 1-2 breakthrough migraines in a month.  Continue with Imitrex 50 mg as needed as an abortive treatment and I will see her back in 3 months for follow-up.       I spent 25 minutes face to face with the patient and spent more than 50% of this time  in management, instructions and education regarding above mentioned diagnosis and also on counseling and discussing about taking medication regularly, possible side effects with medication use, importance of good sleep hygiene, good hydration and regular exercise.    Return in about 3 months (around 4/4/2021).

## 2021-05-06 ENCOUNTER — OFFICE VISIT (OUTPATIENT)
Dept: NEUROLOGY | Facility: CLINIC | Age: 26
End: 2021-05-06

## 2021-05-06 VITALS
DIASTOLIC BLOOD PRESSURE: 84 MMHG | WEIGHT: 190 LBS | OXYGEN SATURATION: 98 % | TEMPERATURE: 97.5 F | SYSTOLIC BLOOD PRESSURE: 122 MMHG | BODY MASS INDEX: 33.66 KG/M2 | HEART RATE: 116 BPM | HEIGHT: 63 IN

## 2021-05-06 DIAGNOSIS — G43.019 INTRACTABLE MIGRAINE WITHOUT AURA AND WITHOUT STATUS MIGRAINOSUS: Primary | ICD-10-CM

## 2021-05-06 PROCEDURE — 99214 OFFICE O/P EST MOD 30 MIN: CPT | Performed by: PSYCHIATRY & NEUROLOGY

## 2021-05-06 RX ORDER — SUMATRIPTAN 100 MG/1
TABLET, FILM COATED ORAL
Qty: 9 TABLET | Refills: 6 | Status: SHIPPED | OUTPATIENT
Start: 2021-05-06 | End: 2021-05-07 | Stop reason: SDUPTHER

## 2021-05-06 RX ORDER — BUSPIRONE HYDROCHLORIDE 5 MG/1
5 TABLET ORAL 3 TIMES DAILY
COMMUNITY
End: 2021-06-23

## 2021-05-06 NOTE — PROGRESS NOTES
Subjective:    CC: Elisabeth Martinez is in clinic today for follow up for episodic migraines.    HPI:  Follow-up: 7/10/2019: She is in clinic for regular follow-up.  Since her last visit, her insurance did approve Aimovig 140 mg subcutaneous injection.  She has done total of 2 injections since her last visit and reports that she is doing really well.  She has had only one headache in the last 2 months for recheck to take sumatriptan.  She is tolerating Aimovig well without any side effects.    Follow-up: 1/4/2021: She is in clinic for follow-up after July 2019.  She reports that she ran out of her Aimovig in August 2020 and her primary care physician's office could not refill it so since then she is not taking.  Since running out of Aimovig in August, she reports that the migraine intensity and frequency have increased and currently she is experiencing 8-9 breakthrough migraine in a month.  While on Aimovig, it would reduce to 1-2 migraines in a month.  She is currently taking sumatriptan 100 mg as needed as an abortive treatment and it is working well.  She wants to go back on Aimovig.    5/6/2021: She is in clinic for regular follow-up.  Since her last visit in January, she has now restarted Aimovig monthly injections and have done total 3 injections.  With Aimovig, she has again had excellent response and the migraine intensity and frequency has reduced to 1-2 migraines in a month.  She is using Imitrex 100 mg as needed as an abortive treatment and it works well.    The following portions of the patient's history were reviewed and updated as of 05/06/2021: allergies, social history and problem list.       Current Outpatient Medications:   •  busPIRone (BUSPAR) 5 MG tablet, Take 5 mg by mouth 3 (Three) Times a Day., Disp: , Rfl:   •  cetirizine (zyrTEC) 10 MG tablet, Take 10 mg by mouth Daily., Disp: , Rfl:   •  Erenumab-aooe (Aimovig) 140 MG/ML prefilled syringe, Inject 1 mL under the skin into the appropriate area as  "directed Every 30 (Thirty) Days., Disp: 1.12 mL, Rfl: 11  •  FLUoxetine (PROzac) 20 MG capsule, Take 40 mg by mouth Daily., Disp: , Rfl:   •  fluticasone (FLONASE) 50 MCG/ACT nasal spray, 2 sprays into the nostril(s) as directed by provider Daily., Disp: , Rfl:   •  metFORMIN ER (GLUCOPHAGE-XR) 750 MG 24 hr tablet, Take 750 mg by mouth Daily With Breakfast., Disp: , Rfl:   •  montelukast (SINGULAIR) 10 MG tablet, Take 10 mg by mouth Every Night., Disp: , Rfl:   •  SUMAtriptan (IMITREX) 100 MG tablet, ., Disp: 9 tablet, Rfl: 6  •  Vitamin D, Cholecalciferol, (CHOLECALCIFEROL) 400 units tablet, Take 400 Units by mouth Daily., Disp: , Rfl:    Past Medical History:   Diagnosis Date   • Anxiety    • Migraine    • PCOS (polycystic ovarian syndrome)       History reviewed. No pertinent surgical history.   Family History   Problem Relation Age of Onset   • Seizures Mother    • Heart disease Maternal Grandmother    • Stroke Maternal Grandmother    • Cancer Maternal Grandfather    • Diabetes Maternal Grandfather         Review of Systems   Constitutional: Negative.    HENT: Negative.    Eyes: Negative.    Respiratory: Negative.    Cardiovascular: Negative.    Gastrointestinal: Negative.    Endocrine: Negative.    Genitourinary: Negative.    Musculoskeletal: Negative.    Skin: Negative.    Allergic/Immunologic: Negative.    Neurological: Positive for headache.   Hematological: Negative.    Psychiatric/Behavioral: Negative.      Objective:    /84   Pulse 116   Temp 97.5 °F (36.4 °C)   Ht 160 cm (63\")   Wt 86.2 kg (190 lb)   SpO2 98%   BMI 33.66 kg/m²     Neurology Exam:  General apperance: NAD.     Mental status: Alert, awake and oriented to time place and person.    Recent and Remote memory: Can recall 3/3 objects at 5 minutes. Can recall historical events.     Attention span and Concentration: Serial 7s: Normal.     Fund of knowledge:  Normal.     Language and Speech: No aphasia or dysarthria.    Naming , " Repitition and Comprehension:  Can name objects, repeat a sentence and follow commands. Speech is clear and fluent with good repetition, comprehension, and naming.    CN II to XII: Intact.    Opthalmoscopic Exam: No papilledema.    Motor:  Right UE muscle strength 5/5. Normal tone.     Left UE muscle strength 5/5. Normal tone.      Right LE muscle strength5/5. Normal tone.     Left LE muscle strength 5/5. Normal tone.      Sensory: Normal light touch, vibration and pinprick sensation bilaterally.    DTRs: 2+ bilaterally.    Babinski: Negative bilaterally.    Co-ordination: Normal finger-to-nose, heel to shin B/L.    Rhomberg: Negative.    Gait: Normal.    Cardiovascular: Regular rate and rhythm without murmur, gallop or rub.    Assessment and Plan:  1. Intractable migraine without aura and without status migrainosus  She restarted Aimovig back in January and has done total of 3 injections.  She has had excellent response after restarting Aimovig and currently is experiencing 1-2 breakthrough migraines in a month.  Imitrex is working well as an abortive treatment.  Continue with this combination I will see her back in 6 months for follow-up.       I spent 30 minutes face to face with the patient and spent more than 50% of this time  in management, instructions and education regarding above mentioned diagnosis and also on counseling and discussing about taking medication regularly, possible side effects with medication use, importance of good sleep hygiene, good hydration and regular exercise.    Return in about 6 months (around 11/6/2021).

## 2021-05-07 ENCOUNTER — TELEPHONE (OUTPATIENT)
Dept: NEUROLOGY | Facility: CLINIC | Age: 26
End: 2021-05-07

## 2021-05-07 RX ORDER — SUMATRIPTAN 100 MG/1
TABLET, FILM COATED ORAL
Qty: 9 TABLET | Refills: 6 | Status: SHIPPED | OUTPATIENT
Start: 2021-05-07 | End: 2021-06-23 | Stop reason: SDUPTHER

## 2021-05-07 NOTE — TELEPHONE ENCOUNTER
Pharmacy Name:  [facility]     Pharmacy representative name: JUNE    Pharmacy representative phone number: [phone]    What medication are you calling in regards to: SUMAtriptan (IMITREX) 100 MG tablet    What question does the pharmacy have: NO DIRECTIONS WRITTEN ON THE PRESCRIPTION     Who is the provider that prescribed the medication: DR JEAN     Additional notes: PLEASE ADVISE.

## 2021-06-23 ENCOUNTER — OFFICE VISIT (OUTPATIENT)
Dept: INTERNAL MEDICINE | Facility: CLINIC | Age: 26
End: 2021-06-23

## 2021-06-23 VITALS
RESPIRATION RATE: 18 BRPM | HEART RATE: 113 BPM | SYSTOLIC BLOOD PRESSURE: 118 MMHG | BODY MASS INDEX: 34.91 KG/M2 | OXYGEN SATURATION: 99 % | HEIGHT: 63 IN | WEIGHT: 197 LBS | TEMPERATURE: 97.3 F | DIASTOLIC BLOOD PRESSURE: 80 MMHG

## 2021-06-23 DIAGNOSIS — G43.919 INTRACTABLE MIGRAINE WITHOUT STATUS MIGRAINOSUS, UNSPECIFIED MIGRAINE TYPE: ICD-10-CM

## 2021-06-23 DIAGNOSIS — R00.0 TACHYCARDIA: ICD-10-CM

## 2021-06-23 DIAGNOSIS — F41.9 ANXIETY: ICD-10-CM

## 2021-06-23 DIAGNOSIS — R73.03 PRE-DIABETES: ICD-10-CM

## 2021-06-23 DIAGNOSIS — N20.0 NEPHROLITHIASIS: ICD-10-CM

## 2021-06-23 DIAGNOSIS — N30.00 ACUTE CYSTITIS WITHOUT HEMATURIA: Primary | ICD-10-CM

## 2021-06-23 DIAGNOSIS — J30.2 SEASONAL ALLERGIES: ICD-10-CM

## 2021-06-23 LAB
BILIRUB BLD-MCNC: NEGATIVE MG/DL
CLARITY, POC: CLEAR
COLOR UR: YELLOW
GLUCOSE UR STRIP-MCNC: NEGATIVE MG/DL
KETONES UR QL: NEGATIVE
LEUKOCYTE EST, POC: NEGATIVE
NITRITE UR-MCNC: POSITIVE MG/ML
PH UR: 6 [PH] (ref 5–8)
PROT UR STRIP-MCNC: NEGATIVE MG/DL
RBC # UR STRIP: NEGATIVE /UL
SP GR UR: 1.02 (ref 1–1.03)
UROBILINOGEN UR QL: NORMAL

## 2021-06-23 PROCEDURE — 81003 URINALYSIS AUTO W/O SCOPE: CPT | Performed by: INTERNAL MEDICINE

## 2021-06-23 PROCEDURE — 99214 OFFICE O/P EST MOD 30 MIN: CPT | Performed by: INTERNAL MEDICINE

## 2021-06-23 RX ORDER — FLUOXETINE HYDROCHLORIDE 40 MG/1
40 CAPSULE ORAL DAILY
Qty: 30 CAPSULE | Refills: 2 | Status: SHIPPED | OUTPATIENT
Start: 2021-06-23 | End: 2021-09-08 | Stop reason: SDUPTHER

## 2021-06-23 RX ORDER — BUPROPION HYDROCHLORIDE 150 MG/1
150 TABLET ORAL DAILY
Qty: 30 TABLET | Refills: 2 | Status: SHIPPED | OUTPATIENT
Start: 2021-06-23 | End: 2021-09-08 | Stop reason: SDUPTHER

## 2021-06-23 RX ORDER — SULFAMETHOXAZOLE AND TRIMETHOPRIM 800; 160 MG/1; MG/1
1 TABLET ORAL 2 TIMES DAILY
Qty: 6 TABLET | Refills: 0 | Status: SHIPPED | OUTPATIENT
Start: 2021-06-23 | End: 2021-09-08

## 2021-06-23 RX ORDER — BUSPIRONE HYDROCHLORIDE 7.5 MG/1
TABLET ORAL
COMMUNITY
Start: 2021-04-21 | End: 2021-06-23 | Stop reason: SDUPTHER

## 2021-06-23 RX ORDER — METFORMIN HYDROCHLORIDE 750 MG/1
750 TABLET, EXTENDED RELEASE ORAL
Qty: 30 TABLET | Refills: 2 | Status: SHIPPED | OUTPATIENT
Start: 2021-06-23 | End: 2021-09-26 | Stop reason: SDUPTHER

## 2021-06-23 RX ORDER — BUSPIRONE HYDROCHLORIDE 7.5 MG/1
7.5 TABLET ORAL DAILY
Qty: 30 TABLET | Refills: 2 | Status: SHIPPED | OUTPATIENT
Start: 2021-06-23 | End: 2021-09-26 | Stop reason: SDUPTHER

## 2021-06-23 RX ORDER — MONTELUKAST SODIUM 10 MG/1
10 TABLET ORAL NIGHTLY
Qty: 30 TABLET | Refills: 2 | Status: SHIPPED | OUTPATIENT
Start: 2021-06-23 | End: 2021-10-06 | Stop reason: SDUPTHER

## 2021-06-23 RX ORDER — SUMATRIPTAN 100 MG/1
TABLET, FILM COATED ORAL
Qty: 9 TABLET | Refills: 6 | Status: SHIPPED | OUTPATIENT
Start: 2021-06-23 | End: 2021-09-26 | Stop reason: SDUPTHER

## 2021-06-23 RX ORDER — FLUOXETINE HYDROCHLORIDE 40 MG/1
CAPSULE ORAL
COMMUNITY
Start: 2021-04-13 | End: 2021-06-23 | Stop reason: SDUPTHER

## 2021-06-23 RX ORDER — TAMSULOSIN HYDROCHLORIDE 0.4 MG/1
1 CAPSULE ORAL DAILY
Qty: 30 CAPSULE | Refills: 0 | Status: SHIPPED | OUTPATIENT
Start: 2021-06-23 | End: 2021-09-08

## 2021-06-23 NOTE — PROGRESS NOTES
Office Note      Date: 2021  Patient Name: Elisabeth Martinez  MRN: [id number]  : 1995    Chief Complaint   Patient presents with   • Nephrolithiasis       History of Present Illness: Elisabeth Martinez is a 26 y.o. female who presents for Nephrolithiasis. Reports she has passed 2 kidney stones. No hematuria, no fever, or dysuria. Has chills. UA today shows nitrates.   Seen last year in the ED for renal stones.   Recently switched to her own insurance. Requesting refills of med. Started on metformin for pre DM by ob/gyn, has PCOS. Has not taken this for a few months. No GI SE.   On singular for allergies.  On prozac and buspar for anxiety but feels like they are not working well for her. Is a  and is stressed from work.   Has weight gain despite not eating much. Drinks sodas.   Reports high heart rate at home and high resting HR.   Subjective      Review of Systems:   Pertinent review of systems per HPI.    Review of Systems   Constitutional: Negative for activity change, appetite change, chills, diaphoresis, fatigue, fever and unexpected weight change.   HENT: Negative for congestion, dental problem, drooling, ear discharge, ear pain, facial swelling, hearing loss and mouth sores.    Eyes: Negative for pain, discharge and itching.   Respiratory: Negative for apnea, cough, choking, chest tightness and shortness of breath.    Cardiovascular: Negative for chest pain, palpitations and leg swelling.   Gastrointestinal: Negative for abdominal distention, abdominal pain, blood in stool, constipation and diarrhea.   Endocrine: Negative for cold intolerance, heat intolerance, polydipsia and polyuria.   Genitourinary: Positive for flank pain. Negative for difficulty urinating, dysuria, frequency and hematuria.   Skin: Negative for color change, pallor, rash and wound.   Allergic/Immunologic: Negative for environmental allergies, food allergies and immunocompromised state.   Neurological: Negative for  "dizziness, weakness and light-headedness.   Psychiatric/Behavioral: Negative for agitation, behavioral problems, confusion, decreased concentration and self-injury. The patient is not nervous/anxious.    All other systems reviewed and are negative.    No Known Allergies    Objective     Physical Exam:  Vital Signs:   Vitals:    06/23/21 1358   BP: 118/80   Pulse: 113   Resp: 18   Temp: 97.3 °F (36.3 °C)   TempSrc: Temporal   SpO2: 99%   Weight: 89.4 kg (197 lb)   Height: 160 cm (63\")      Body mass index is 34.9 kg/m².    Physical Exam  Vitals and nursing note reviewed.   Constitutional:       General: She is not in acute distress.     Appearance: She is well-developed. She is obese.   HENT:      Head: Normocephalic and atraumatic.      Right Ear: External ear normal.      Left Ear: External ear normal.   Eyes:      General: No scleral icterus.        Right eye: No discharge.         Left eye: No discharge.      Conjunctiva/sclera: Conjunctivae normal.   Cardiovascular:      Rate and Rhythm: Normal rate and regular rhythm.      Heart sounds: Normal heart sounds. No murmur heard.   No friction rub. No gallop.    Pulmonary:      Effort: Pulmonary effort is normal. No respiratory distress.      Breath sounds: Normal breath sounds. No wheezing or rales.   Abdominal:      Tenderness: There is no right CVA tenderness or left CVA tenderness.   Skin:     General: Skin is warm and dry.      Coloration: Skin is not pale.         Assessment / Plan      Assessment & Plan:    1. Nephrolithiasis  Will eval for nephrolithasis, increase hydration.   - CT Abdomen Pelvis Stone Protocol; Future  - tamsulosin (FLOMAX) 0.4 MG capsule 24 hr capsule; Take 1 capsule by mouth Daily.  Dispense: 30 capsule; Refill: 0    2. Acute cystitis without hematuria  Nitrates on UA and chills, will treat for UTI while awaiting nephrolithiasis workup  - POCT urinalysis dipstick, automated  - sulfamethoxazole-trimethoprim (Bactrim DS) 800-160 MG per " tablet; Take 1 tablet by mouth 2 (Two) Times a Day.  Dispense: 6 tablet; Refill: 0    3. Anxiety  Add on wellbutrin, goal is to wean down/off prozac due to weight gain SE  - busPIRone (BUSPAR) 7.5 MG tablet; Take 1 tablet by mouth Daily.  Dispense: 30 tablet; Refill: 2  - FLUoxetine (PROzac) 40 MG capsule; Take 1 capsule by mouth Daily.  Dispense: 30 capsule; Refill: 2  - buPROPion XL (Wellbutrin XL) 150 MG 24 hr tablet; Take 1 tablet by mouth Daily.  Dispense: 30 tablet; Refill: 2    4. Intractable migraine without status migrainosus, unspecified migraine type    - SUMAtriptan (IMITREX) 100 MG tablet; Take 1 TAB @the onset of Migraine as needed.  Dispense: 9 tablet; Refill: 6    5. Tachycardia  Will treat for anxiety and also advised incrase water intake. Checking a1c. Also pt to start wearing her iwatch to get average HR at next visit    6. Pre-diabetes  Advised weight loss, cut out sodas. Annual exam labs in 3 months including a1c as she has not been taking this med.   - metFORMIN ER (GLUCOPHAGE-XR) 750 MG 24 hr tablet; Take 1 tablet by mouth Daily With Breakfast.  Dispense: 30 tablet; Refill: 2    7. Seasonal allergies    - montelukast (SINGULAIR) 10 MG tablet; Take 1 tablet by mouth Every Night.  Dispense: 30 tablet; Refill: 2      Leora West MD  06/23/2021     Please note that portions of this note may have been completed with a voice recognition program. Efforts were made to edit the dictations, but occasionally words are mistranscribed.

## 2021-07-01 ENCOUNTER — APPOINTMENT (OUTPATIENT)
Dept: CT IMAGING | Facility: HOSPITAL | Age: 26
End: 2021-07-01

## 2021-07-01 ENCOUNTER — HOSPITAL ENCOUNTER (EMERGENCY)
Facility: HOSPITAL | Age: 26
Discharge: HOME | End: 2021-07-01
Attending: EMERGENCY MEDICINE | Admitting: EMERGENCY MEDICINE

## 2021-07-01 VITALS
WEIGHT: 197 LBS | TEMPERATURE: 98.7 F | RESPIRATION RATE: 18 BRPM | HEIGHT: 63 IN | HEART RATE: 117 BPM | BODY MASS INDEX: 34.91 KG/M2 | DIASTOLIC BLOOD PRESSURE: 65 MMHG | OXYGEN SATURATION: 95 % | SYSTOLIC BLOOD PRESSURE: 120 MMHG

## 2021-07-01 DIAGNOSIS — N20.1 URETEROLITHIASIS: Primary | ICD-10-CM

## 2021-07-01 DIAGNOSIS — N39.0 ACUTE UTI: ICD-10-CM

## 2021-07-01 DIAGNOSIS — N13.39 OTHER HYDRONEPHROSIS: ICD-10-CM

## 2021-07-01 LAB
ALBUMIN SERPL-MCNC: 4.1 G/DL (ref 3.5–5.2)
ALBUMIN/GLOB SERPL: 1.4 G/DL
ALP SERPL-CCNC: 111 U/L (ref 39–117)
ALT SERPL W P-5'-P-CCNC: 32 U/L (ref 1–33)
ANION GAP SERPL CALCULATED.3IONS-SCNC: 10 MMOL/L (ref 5–15)
AST SERPL-CCNC: 26 U/L (ref 1–32)
B-HCG UR QL: NEGATIVE
BACTERIA UR QL AUTO: ABNORMAL /HPF
BASOPHILS # BLD AUTO: 0.09 10*3/MM3 (ref 0–0.2)
BASOPHILS NFR BLD AUTO: 0.7 % (ref 0–1.5)
BILIRUB SERPL-MCNC: <0.2 MG/DL (ref 0–1.2)
BILIRUB UR QL STRIP: NEGATIVE
BUN SERPL-MCNC: 12 MG/DL (ref 6–20)
BUN/CREAT SERPL: 19.4 (ref 7–25)
CALCIUM SPEC-SCNC: 9.8 MG/DL (ref 8.6–10.5)
CHLORIDE SERPL-SCNC: 99 MMOL/L (ref 98–107)
CLARITY UR: ABNORMAL
CO2 SERPL-SCNC: 25 MMOL/L (ref 22–29)
COLOR UR: YELLOW
CREAT SERPL-MCNC: 0.62 MG/DL (ref 0.57–1)
DEPRECATED RDW RBC AUTO: 43.5 FL (ref 37–54)
EOSINOPHIL # BLD AUTO: 0.2 10*3/MM3 (ref 0–0.4)
EOSINOPHIL NFR BLD AUTO: 1.6 % (ref 0.3–6.2)
ERYTHROCYTE [DISTWIDTH] IN BLOOD BY AUTOMATED COUNT: 15.8 % (ref 12.3–15.4)
GFR SERPL CREATININE-BSD FRML MDRD: 116 ML/MIN/1.73
GLOBULIN UR ELPH-MCNC: 3 GM/DL
GLUCOSE SERPL-MCNC: 121 MG/DL (ref 65–99)
GLUCOSE UR STRIP-MCNC: NEGATIVE MG/DL
HCT VFR BLD AUTO: 35.2 % (ref 34–46.6)
HGB BLD-MCNC: 10.8 G/DL (ref 12–15.9)
HGB UR QL STRIP.AUTO: ABNORMAL
HOLD SPECIMEN: NORMAL
IMM GRANULOCYTES # BLD AUTO: 0.07 10*3/MM3 (ref 0–0.05)
IMM GRANULOCYTES NFR BLD AUTO: 0.6 % (ref 0–0.5)
INTERNAL NEGATIVE CONTROL: NORMAL
INTERNAL POSITIVE CONTROL: NORMAL
KETONES UR QL STRIP: NEGATIVE
LEUKOCYTE ESTERASE UR QL STRIP.AUTO: ABNORMAL
LIPASE SERPL-CCNC: 35 U/L (ref 13–60)
LYMPHOCYTES # BLD AUTO: 3.44 10*3/MM3 (ref 0.7–3.1)
LYMPHOCYTES NFR BLD AUTO: 27.3 % (ref 19.6–45.3)
Lab: NORMAL
MCH RBC QN AUTO: 23.9 PG (ref 26.6–33)
MCHC RBC AUTO-ENTMCNC: 30.7 G/DL (ref 31.5–35.7)
MCV RBC AUTO: 77.9 FL (ref 79–97)
MONOCYTES # BLD AUTO: 0.81 10*3/MM3 (ref 0.1–0.9)
MONOCYTES NFR BLD AUTO: 6.4 % (ref 5–12)
NEUTROPHILS NFR BLD AUTO: 63.4 % (ref 42.7–76)
NEUTROPHILS NFR BLD AUTO: 7.99 10*3/MM3 (ref 1.7–7)
NITRITE UR QL STRIP: NEGATIVE
NRBC BLD AUTO-RTO: 0 /100 WBC (ref 0–0.2)
PH UR STRIP.AUTO: 5.5 [PH] (ref 5–8)
PLATELET # BLD AUTO: 306 10*3/MM3 (ref 140–450)
PMV BLD AUTO: 11.3 FL (ref 6–12)
POTASSIUM SERPL-SCNC: 3.7 MMOL/L (ref 3.5–5.2)
PROT SERPL-MCNC: 7.1 G/DL (ref 6–8.5)
PROT UR QL STRIP: ABNORMAL
RBC # BLD AUTO: 4.52 10*6/MM3 (ref 3.77–5.28)
RBC # UR: ABNORMAL /HPF
REF LAB TEST METHOD: ABNORMAL
SODIUM SERPL-SCNC: 134 MMOL/L (ref 136–145)
SP GR UR STRIP: 1.02 (ref 1–1.03)
SQUAMOUS #/AREA URNS HPF: ABNORMAL /HPF
UROBILINOGEN UR QL STRIP: ABNORMAL
WBC # BLD AUTO: 12.6 10*3/MM3 (ref 3.4–10.8)
WBC UR QL AUTO: ABNORMAL /HPF
WHOLE BLOOD HOLD SPECIMEN: NORMAL

## 2021-07-01 PROCEDURE — 96365 THER/PROPH/DIAG IV INF INIT: CPT

## 2021-07-01 PROCEDURE — 99283 EMERGENCY DEPT VISIT LOW MDM: CPT

## 2021-07-01 PROCEDURE — 25010000002 CEFTRIAXONE PER 250 MG: Performed by: EMERGENCY MEDICINE

## 2021-07-01 PROCEDURE — 25010000002 KETOROLAC TROMETHAMINE PER 15 MG: Performed by: EMERGENCY MEDICINE

## 2021-07-01 PROCEDURE — 85025 COMPLETE CBC W/AUTO DIFF WBC: CPT | Performed by: EMERGENCY MEDICINE

## 2021-07-01 PROCEDURE — 83690 ASSAY OF LIPASE: CPT | Performed by: EMERGENCY MEDICINE

## 2021-07-01 PROCEDURE — 81001 URINALYSIS AUTO W/SCOPE: CPT | Performed by: EMERGENCY MEDICINE

## 2021-07-01 PROCEDURE — 80053 COMPREHEN METABOLIC PANEL: CPT | Performed by: EMERGENCY MEDICINE

## 2021-07-01 PROCEDURE — 96375 TX/PRO/DX INJ NEW DRUG ADDON: CPT

## 2021-07-01 PROCEDURE — 74176 CT ABD & PELVIS W/O CONTRAST: CPT

## 2021-07-01 PROCEDURE — 87086 URINE CULTURE/COLONY COUNT: CPT | Performed by: EMERGENCY MEDICINE

## 2021-07-01 PROCEDURE — 25010000002 ONDANSETRON PER 1 MG: Performed by: EMERGENCY MEDICINE

## 2021-07-01 PROCEDURE — 81025 URINE PREGNANCY TEST: CPT | Performed by: EMERGENCY MEDICINE

## 2021-07-01 PROCEDURE — 25010000002 HYDROMORPHONE PER 4 MG: Performed by: EMERGENCY MEDICINE

## 2021-07-01 RX ORDER — HYDROMORPHONE HYDROCHLORIDE 1 MG/ML
0.5 INJECTION, SOLUTION INTRAMUSCULAR; INTRAVENOUS; SUBCUTANEOUS ONCE
Status: COMPLETED | OUTPATIENT
Start: 2021-07-01 | End: 2021-07-01

## 2021-07-01 RX ORDER — TAMSULOSIN HYDROCHLORIDE 0.4 MG/1
1 CAPSULE ORAL DAILY
Qty: 10 CAPSULE | Refills: 0 | Status: SHIPPED | OUTPATIENT
Start: 2021-07-01 | End: 2021-09-08

## 2021-07-01 RX ORDER — ONDANSETRON 4 MG/1
4 TABLET, FILM COATED ORAL EVERY 8 HOURS PRN
Qty: 15 TABLET | Refills: 0 | Status: SHIPPED | OUTPATIENT
Start: 2021-07-01 | End: 2021-09-08

## 2021-07-01 RX ORDER — KETOROLAC TROMETHAMINE 15 MG/ML
15 INJECTION, SOLUTION INTRAMUSCULAR; INTRAVENOUS ONCE
Status: COMPLETED | OUTPATIENT
Start: 2021-07-01 | End: 2021-07-01

## 2021-07-01 RX ORDER — CEFDINIR 300 MG/1
300 CAPSULE ORAL 2 TIMES DAILY
Qty: 14 CAPSULE | Refills: 0 | Status: SHIPPED | OUTPATIENT
Start: 2021-07-01 | End: 2021-09-08

## 2021-07-01 RX ORDER — SODIUM CHLORIDE 9 MG/ML
10 INJECTION INTRAVENOUS AS NEEDED
Status: DISCONTINUED | OUTPATIENT
Start: 2021-07-01 | End: 2021-07-01 | Stop reason: HOSPADM

## 2021-07-01 RX ORDER — HYDROCODONE BITARTRATE AND ACETAMINOPHEN 5; 325 MG/1; MG/1
1 TABLET ORAL EVERY 6 HOURS PRN
Qty: 12 TABLET | Refills: 0 | Status: SHIPPED | OUTPATIENT
Start: 2021-07-01 | End: 2021-09-08

## 2021-07-01 RX ORDER — ONDANSETRON 2 MG/ML
4 INJECTION INTRAMUSCULAR; INTRAVENOUS ONCE
Status: COMPLETED | OUTPATIENT
Start: 2021-07-01 | End: 2021-07-01

## 2021-07-01 RX ADMIN — ONDANSETRON 4 MG: 2 INJECTION INTRAMUSCULAR; INTRAVENOUS at 02:02

## 2021-07-01 RX ADMIN — KETOROLAC TROMETHAMINE 15 MG: 15 INJECTION, SOLUTION INTRAMUSCULAR; INTRAVENOUS at 03:33

## 2021-07-01 RX ADMIN — HYDROMORPHONE HYDROCHLORIDE 0.5 MG: 1 INJECTION, SOLUTION INTRAMUSCULAR; INTRAVENOUS; SUBCUTANEOUS at 02:02

## 2021-07-01 RX ADMIN — SODIUM CHLORIDE 1 G: 900 INJECTION INTRAVENOUS at 03:28

## 2021-07-01 RX ADMIN — SODIUM CHLORIDE 1000 ML: 9 INJECTION, SOLUTION INTRAVENOUS at 02:01

## 2021-07-01 NOTE — ED PROVIDER NOTES
Subjective   26-year-old female presents for evaluation of left flank pain.  Of note, the patient has a history of prior kidney stones.  She states that approximately 1 week ago, she began experiencing pain in her left flank.  She saw her primary care physician and was set up for an outpatient CT scan that never actually got done.  She states that her symptoms resolved spontaneously for approximately 3 to 4 days before returning last night.  Pain is currently quite severe and radiates from her left flank to her left groin.  She endorses accompanying nausea.  Pain is currently 9 out of 10 in severity.  The pain feels similar to the pain that she is experienced in the past with prior kidney stones.          Review of Systems   Gastrointestinal: Positive for nausea.   Genitourinary: Positive for flank pain.   All other systems reviewed and are negative.      Past Medical History:   Diagnosis Date   • Anxiety    • Migraine    • PCOS (polycystic ovarian syndrome)        No Known Allergies    History reviewed. No pertinent surgical history.    Family History   Problem Relation Age of Onset   • Seizures Mother    • Arthritis Mother    • Heart disease Maternal Grandmother    • Stroke Maternal Grandmother    • Cancer Maternal Grandfather    • Diabetes Maternal Grandfather        Social History     Socioeconomic History   • Marital status: Single     Spouse name: Not on file   • Number of children: Not on file   • Years of education: Not on file   • Highest education level: Not on file   Tobacco Use   • Smoking status: Never Smoker   • Smokeless tobacco: Never Used   Substance and Sexual Activity   • Alcohol use: No   • Drug use: Never   • Sexual activity: Defer           Objective   Physical Exam  Vitals and nursing note reviewed.   Constitutional:       Appearance: She is well-developed. She is not diaphoretic.      Comments: Nontoxic-appearing female   HENT:      Head: Normocephalic and atraumatic.   Eyes:      Pupils:  Pupils are equal, round, and reactive to light.   Cardiovascular:      Rate and Rhythm: Normal rate and regular rhythm.      Heart sounds: Normal heart sounds. No murmur heard.   No friction rub. No gallop.    Pulmonary:      Effort: Pulmonary effort is normal. No respiratory distress.      Breath sounds: Normal breath sounds. No wheezing or rales.   Abdominal:      General: Bowel sounds are normal. There is no distension.      Palpations: Abdomen is soft. There is no mass.      Tenderness: There is no abdominal tenderness. There is no guarding or rebound.      Comments: No focal abdominal tenderness, no peritoneal signs, no pain out of proportion to exam   Genitourinary:     Comments: No CVA tenderness present  Musculoskeletal:         General: Normal range of motion.   Skin:     General: Skin is warm and dry.      Findings: No erythema or rash.      Comments: No dermatomal rash noted to left flank   Neurological:      Mental Status: She is alert and oriented to person, place, and time.   Psychiatric:         Mood and Affect: Mood normal.         Thought Content: Thought content normal.         Judgment: Judgment normal.         Procedures           ED Course  ED Course as of Jul 01 0427   Thu Jul 01, 2021   0156 26-year-old female presents for evaluation of left flank pain.  Of note, the patient has a history of prior kidney stones and states that her current symptoms feel similar.  A week ago, she began experiencing left flank pain and had an outpatient CT scan scheduled that never got done.  She states that her symptoms improved spontaneously but returned tonight, prompting her visit to the emergency department.  On arrival, the patient is nontoxic-appearing.  Nonsurgical abdomen.  No CVA tenderness noted.  No dermatomal rash noted to left flank.  We will obtain labs and imaging, and we will reassess following initial interventions.    [DD]   2015 CT revealed a 3 mm proximal left ureteral stone with mild  accompanying hydronephrosis.    [DD]   0313 Urinalysis is somewhat equivocal.  Urine culture obtained.  Rocephin given.    [DD]   0320 Upon reevaluation, the patient feels much improved.  We discussed inpatient versus outpatient management and elected for the latter.  I feel that this is a reasonable approach.  Prescription for Omnicef.  Additionally, the patient was given prescriptions for Norco, Zofran, and Flomax.  She was given a referral to Dr. Hoover of urology and will follow-up within the next week.  Agreeable with plan and given appropriate strict return precautions.    [DD]      ED Course User Index  [DD] Butch Ambrocio MD                                   Recent Results (from the past 24 hour(s))   Comprehensive Metabolic Panel    Collection Time: 07/01/21  1:31 AM    Specimen: Blood   Result Value Ref Range    Glucose 121 (H) 65 - 99 mg/dL    BUN 12 6 - 20 mg/dL    Creatinine 0.62 0.57 - 1.00 mg/dL    Sodium 134 (L) 136 - 145 mmol/L    Potassium 3.7 3.5 - 5.2 mmol/L    Chloride 99 98 - 107 mmol/L    CO2 25.0 22.0 - 29.0 mmol/L    Calcium 9.8 8.6 - 10.5 mg/dL    Total Protein 7.1 6.0 - 8.5 g/dL    Albumin 4.10 3.50 - 5.20 g/dL    ALT (SGPT) 32 1 - 33 U/L    AST (SGOT) 26 1 - 32 U/L    Alkaline Phosphatase 111 39 - 117 U/L    Total Bilirubin <0.2 0.0 - 1.2 mg/dL    eGFR Non African Amer 116 >60 mL/min/1.73    Globulin 3.0 gm/dL    A/G Ratio 1.4 g/dL    BUN/Creatinine Ratio 19.4 7.0 - 25.0    Anion Gap 10.0 5.0 - 15.0 mmol/L   Lipase    Collection Time: 07/01/21  1:31 AM    Specimen: Blood   Result Value Ref Range    Lipase 35 13 - 60 U/L   Green Top (Gel)    Collection Time: 07/01/21  1:31 AM   Result Value Ref Range    Extra Tube Hold for add-ons.    Lavender Top    Collection Time: 07/01/21  1:31 AM   Result Value Ref Range    Extra Tube hold for add-on    Gold Top - SST    Collection Time: 07/01/21  1:31 AM   Result Value Ref Range    Extra Tube Hold for add-ons.    CBC Auto Differential     Collection Time: 07/01/21  1:31 AM    Specimen: Blood   Result Value Ref Range    WBC 12.60 (H) 3.40 - 10.80 10*3/mm3    RBC 4.52 3.77 - 5.28 10*6/mm3    Hemoglobin 10.8 (L) 12.0 - 15.9 g/dL    Hematocrit 35.2 34.0 - 46.6 %    MCV 77.9 (L) 79.0 - 97.0 fL    MCH 23.9 (L) 26.6 - 33.0 pg    MCHC 30.7 (L) 31.5 - 35.7 g/dL    RDW 15.8 (H) 12.3 - 15.4 %    RDW-SD 43.5 37.0 - 54.0 fl    MPV 11.3 6.0 - 12.0 fL    Platelets 306 140 - 450 10*3/mm3    Neutrophil % 63.4 42.7 - 76.0 %    Lymphocyte % 27.3 19.6 - 45.3 %    Monocyte % 6.4 5.0 - 12.0 %    Eosinophil % 1.6 0.3 - 6.2 %    Basophil % 0.7 0.0 - 1.5 %    Immature Grans % 0.6 (H) 0.0 - 0.5 %    Neutrophils, Absolute 7.99 (H) 1.70 - 7.00 10*3/mm3    Lymphocytes, Absolute 3.44 (H) 0.70 - 3.10 10*3/mm3    Monocytes, Absolute 0.81 0.10 - 0.90 10*3/mm3    Eosinophils, Absolute 0.20 0.00 - 0.40 10*3/mm3    Basophils, Absolute 0.09 0.00 - 0.20 10*3/mm3    Immature Grans, Absolute 0.07 (H) 0.00 - 0.05 10*3/mm3    nRBC 0.0 0.0 - 0.2 /100 WBC   Urinalysis With Microscopic If Indicated (No Culture) - Urine, Clean Catch    Collection Time: 07/01/21  2:02 AM    Specimen: Urine, Clean Catch   Result Value Ref Range    Color, UA Yellow Yellow, Straw    Appearance, UA Cloudy (A) Clear    pH, UA 5.5 5.0 - 8.0    Specific Gravity, UA 1.019 1.001 - 1.030    Glucose, UA Negative Negative    Ketones, UA Negative Negative    Bilirubin, UA Negative Negative    Blood, UA Large (3+) (A) Negative    Protein, UA Trace (A) Negative    Leuk Esterase, UA Trace (A) Negative    Nitrite, UA Negative Negative    Urobilinogen, UA 1.0 E.U./dL 0.2 - 1.0 E.U./dL   Urinalysis, Microscopic Only - Urine, Clean Catch    Collection Time: 07/01/21  2:02 AM    Specimen: Urine, Clean Catch   Result Value Ref Range    RBC, UA Too Numerous to Count (A) None Seen, 0-2 /HPF    WBC, UA 13-20 (A) None Seen, 0-2 /HPF    Bacteria, UA 2+ (A) None Seen, Trace /HPF    Squamous Epithelial Cells, UA 3-6 (A) None Seen, 0-2 /HPF  "   Methodology Manual Light Microscopy    POC Pregnancy, Urine    Collection Time: 07/01/21  2:17 AM    Specimen: Urine   Result Value Ref Range    HCG, Urine, QL Negative Negative    Lot Number [id number]     Internal Positive Control Passed Passed    Internal Negative Control Passed Passed     Note: In addition to lab results from this visit, the labs listed above may include labs taken at another facility or during a different encounter within the last 24 hours. Please correlate lab times with ED admission and discharge times for further clarification of the services performed during this visit.    CT Abdomen Pelvis Without Contrast   Final Result      1. Mild left hydronephrosis due to a 3 mm stone in the proximal ureter.   2. Small bilateral nonobstructing kidney stones.   3. 1.7 cm left ovarian cyst.   4. Normal GI tract, including the appendix.               Signer Name: Josemanuel Diaz MD    Signed: 7/1/2021 3:01 AM    Workstation Name: [email or web address]     [facility]        Vitals:    07/01/21 0128 07/01/21 0300 07/01/21 0413   BP: (!) 148/108 125/75 120/65   BP Location: Right arm     Patient Position: Sitting     Pulse: 117     Resp: 18     Temp: 98.7 °F (37.1 °C)     TempSrc: Oral     SpO2: 99% 95%    Weight: 89.4 kg (197 lb)     Height: 160 cm (63\")       Medications   Sodium Chloride (PF) 0.9 % 10 mL (has no administration in time range)   HYDROmorphone (DILAUDID) injection 0.5 mg (0.5 mg Intravenous Given 7/1/21 0202)   ondansetron (ZOFRAN) injection 4 mg (4 mg Intravenous Given 7/1/21 0202)   sodium chloride 0.9 % bolus 1,000 mL (0 mL Intravenous Stopped 7/1/21 0327)   cefTRIAXone (ROCEPHIN) 1 g/100 mL 0.9% NS (MBP) (0 g Intravenous Stopped 7/1/21 0412)   ketorolac (TORADOL) injection 15 mg (15 mg Intravenous Given 7/1/21 0333)     ECG/EMG Results (last 24 hours)     ** No results found for the last 24 hours. **        No orders to display               MDM    Final diagnoses: "   Ureterolithiasis   Other hydronephrosis   Acute UTI       ED Disposition  ED Disposition     ED Disposition Condition Comment    Discharge Stable           Franky Hoover MD  [address] [address]  [phone]    In 1 week           Medication List      New Prescriptions    cefdinir 300 MG capsule  Commonly known as: OMNICEF  Take 1 capsule by mouth 2 (Two) Times a Day.     HYDROcodone-acetaminophen 5-325 MG per tablet  Commonly known as: NORCO  Take 1 tablet by mouth Every 6 (Six) Hours As Needed for Moderate Pain .     ondansetron 4 MG tablet  Commonly known as: ZOFRAN  Take 1 tablet by mouth Every 8 (Eight) Hours As Needed for Nausea.        Changed    * tamsulosin 0.4 MG capsule 24 hr capsule  Commonly known as: FLOMAX  Take 1 capsule by mouth Daily.  What changed: Another medication with the same name was added. Make sure you understand how and when to take each.     * tamsulosin 0.4 MG capsule 24 hr capsule  Commonly known as: FLOMAX  Take 1 capsule by mouth Daily.  What changed: You were already taking a medication with the same name, and this prescription was added. Make sure you understand how and when to take each.         * This list has 2 medication(s) that are the same as other medications prescribed for you. Read the directions carefully, and ask your doctor or other care provider to review them with you.               Where to Get Your Medications      These medications were sent to [facility] [address] [place] [address] - [address] [place] [address] - [phone]  - [phone]   [address] [place] [id number]    Phone: [phone]   · cefdinir 300 MG capsule  · HYDROcodone-acetaminophen 5-325 MG per tablet  · ondansetron 4 MG tablet  · tamsulosin 0.4 MG capsule 24 hr capsule          Butch Ambrocio MD  07/01/21 0429

## 2021-07-02 LAB — BACTERIA SPEC AEROBE CULT: NORMAL

## 2021-07-30 ENCOUNTER — TELEPHONE (OUTPATIENT)
Dept: NEUROLOGY | Facility: CLINIC | Age: 26
End: 2021-07-30

## 2021-07-30 RX ORDER — ERENUMAB-AOOE 140 MG/ML
140 INJECTION, SOLUTION SUBCUTANEOUS
Qty: 1.12 ML | Refills: 11 | Status: SHIPPED | OUTPATIENT
Start: 2021-07-30 | End: 2021-09-08

## 2021-07-30 NOTE — TELEPHONE ENCOUNTER
.    Caller: Elisabeth Martinez    Relationship: Self    Best call back number: [phone]    Medication needed:   Requested Prescriptions     Pending Prescriptions Disp Refills   • Erenumab-aooe (Aimovig) 140 MG/ML prefilled syringe 1.12 mL 11     Sig: Inject 1 mL under the skin into the appropriate area as directed Every 30 (Thirty) Days.       When do you need the refill by:  ASAP     What additional details did the patient provide when requesting the medication:  [facility] ADVIS[facility] THEY DO NOT HAVE ORDER FOR THIS  PLEASE ADVISE     Does the patient have less than a 3 day supply:  [x] Yes  [] No    What is the patient's preferred pharmacy:      [facility]ISTED

## 2021-08-05 RX ORDER — ERENUMAB-AOOE 140 MG/ML
140 INJECTION, SOLUTION SUBCUTANEOUS
Qty: 1.12 ML | Refills: 11 | OUTPATIENT
Start: 2021-08-05

## 2021-09-08 ENCOUNTER — LAB (OUTPATIENT)
Dept: LAB | Facility: HOSPITAL | Age: 26
End: 2021-09-08

## 2021-09-08 ENCOUNTER — OFFICE VISIT (OUTPATIENT)
Dept: INTERNAL MEDICINE | Facility: CLINIC | Age: 26
End: 2021-09-08

## 2021-09-08 VITALS
HEART RATE: 113 BPM | HEIGHT: 63 IN | DIASTOLIC BLOOD PRESSURE: 70 MMHG | TEMPERATURE: 96.9 F | OXYGEN SATURATION: 99 % | RESPIRATION RATE: 20 BRPM | WEIGHT: 201 LBS | BODY MASS INDEX: 35.61 KG/M2 | SYSTOLIC BLOOD PRESSURE: 116 MMHG

## 2021-09-08 DIAGNOSIS — F41.9 ANXIETY: Primary | ICD-10-CM

## 2021-09-08 DIAGNOSIS — E66.09 CLASS 2 OBESITY DUE TO EXCESS CALORIES WITHOUT SERIOUS COMORBIDITY WITH BODY MASS INDEX (BMI) OF 35.0 TO 35.9 IN ADULT: ICD-10-CM

## 2021-09-08 DIAGNOSIS — Z00.00 ANNUAL PHYSICAL EXAM: ICD-10-CM

## 2021-09-08 LAB
25(OH)D3 SERPL-MCNC: 63.9 NG/ML (ref 30–100)
ALBUMIN SERPL-MCNC: 4.1 G/DL (ref 3.5–5.2)
ALBUMIN/GLOB SERPL: 1.3 G/DL
ALP SERPL-CCNC: 109 U/L (ref 39–117)
ALT SERPL W P-5'-P-CCNC: 25 U/L (ref 1–33)
ANION GAP SERPL CALCULATED.3IONS-SCNC: 10.6 MMOL/L (ref 5–15)
AST SERPL-CCNC: 20 U/L (ref 1–32)
BILIRUB SERPL-MCNC: 0.2 MG/DL (ref 0–1.2)
BUN SERPL-MCNC: 10 MG/DL (ref 6–20)
BUN/CREAT SERPL: 14.7 (ref 7–25)
CALCIUM SPEC-SCNC: 9.5 MG/DL (ref 8.6–10.5)
CHLORIDE SERPL-SCNC: 103 MMOL/L (ref 98–107)
CHOLEST SERPL-MCNC: 225 MG/DL (ref 0–200)
CO2 SERPL-SCNC: 24.4 MMOL/L (ref 22–29)
CREAT SERPL-MCNC: 0.68 MG/DL (ref 0.57–1)
DEPRECATED RDW RBC AUTO: 40.3 FL (ref 37–54)
ERYTHROCYTE [DISTWIDTH] IN BLOOD BY AUTOMATED COUNT: 14.6 % (ref 12.3–15.4)
GFR SERPL CREATININE-BSD FRML MDRD: 105 ML/MIN/1.73
GLOBULIN UR ELPH-MCNC: 3.1 GM/DL
GLUCOSE SERPL-MCNC: 121 MG/DL (ref 65–99)
HBA1C MFR BLD: 5.94 % (ref 4.8–5.6)
HCT VFR BLD AUTO: 36.5 % (ref 34–46.6)
HCV AB SER DONR QL: NORMAL
HDLC SERPL-MCNC: 37 MG/DL (ref 40–60)
HGB BLD-MCNC: 11.4 G/DL (ref 12–15.9)
LDLC SERPL CALC-MCNC: 141 MG/DL (ref 0–100)
LDLC/HDLC SERPL: 3.69 {RATIO}
MCH RBC QN AUTO: 23.8 PG (ref 26.6–33)
MCHC RBC AUTO-ENTMCNC: 31.2 G/DL (ref 31.5–35.7)
MCV RBC AUTO: 76 FL (ref 79–97)
PLATELET # BLD AUTO: 325 10*3/MM3 (ref 140–450)
PMV BLD AUTO: 11.6 FL (ref 6–12)
POTASSIUM SERPL-SCNC: 3.5 MMOL/L (ref 3.5–5.2)
PROT SERPL-MCNC: 7.2 G/DL (ref 6–8.5)
RBC # BLD AUTO: 4.8 10*6/MM3 (ref 3.77–5.28)
SODIUM SERPL-SCNC: 138 MMOL/L (ref 136–145)
T4 FREE SERPL-MCNC: 1.04 NG/DL (ref 0.93–1.7)
TRIGL SERPL-MCNC: 258 MG/DL (ref 0–150)
TSH SERPL DL<=0.05 MIU/L-ACNC: 4.65 UIU/ML (ref 0.27–4.2)
VIT B12 BLD-MCNC: 641 PG/ML (ref 211–946)
VLDLC SERPL-MCNC: 47 MG/DL (ref 5–40)
WBC # BLD AUTO: 9.78 10*3/MM3 (ref 3.4–10.8)

## 2021-09-08 PROCEDURE — 84443 ASSAY THYROID STIM HORMONE: CPT | Performed by: INTERNAL MEDICINE

## 2021-09-08 PROCEDURE — 86803 HEPATITIS C AB TEST: CPT | Performed by: INTERNAL MEDICINE

## 2021-09-08 PROCEDURE — 84439 ASSAY OF FREE THYROXINE: CPT | Performed by: INTERNAL MEDICINE

## 2021-09-08 PROCEDURE — 85027 COMPLETE CBC AUTOMATED: CPT | Performed by: INTERNAL MEDICINE

## 2021-09-08 PROCEDURE — 83036 HEMOGLOBIN GLYCOSYLATED A1C: CPT | Performed by: INTERNAL MEDICINE

## 2021-09-08 PROCEDURE — 99214 OFFICE O/P EST MOD 30 MIN: CPT | Performed by: INTERNAL MEDICINE

## 2021-09-08 PROCEDURE — 80053 COMPREHEN METABOLIC PANEL: CPT | Performed by: INTERNAL MEDICINE

## 2021-09-08 PROCEDURE — 99395 PREV VISIT EST AGE 18-39: CPT | Performed by: INTERNAL MEDICINE

## 2021-09-08 PROCEDURE — 80061 LIPID PANEL: CPT | Performed by: INTERNAL MEDICINE

## 2021-09-08 PROCEDURE — 82306 VITAMIN D 25 HYDROXY: CPT | Performed by: INTERNAL MEDICINE

## 2021-09-08 PROCEDURE — 82607 VITAMIN B-12: CPT | Performed by: INTERNAL MEDICINE

## 2021-09-08 RX ORDER — FLUOXETINE HYDROCHLORIDE 20 MG/1
20 CAPSULE ORAL DAILY
Qty: 30 CAPSULE | Refills: 2 | Status: SHIPPED | OUTPATIENT
Start: 2021-09-08 | End: 2021-10-20 | Stop reason: SDUPTHER

## 2021-09-08 RX ORDER — FLUTICASONE PROPIONATE 50 MCG
2 SPRAY, SUSPENSION (ML) NASAL DAILY
Qty: 11.1 ML | Refills: 10 | Status: SHIPPED | OUTPATIENT
Start: 2021-09-08 | End: 2021-10-06 | Stop reason: SDUPTHER

## 2021-09-08 RX ORDER — BUPROPION HYDROCHLORIDE 300 MG/1
300 TABLET ORAL DAILY
Qty: 30 TABLET | Refills: 3 | Status: SHIPPED | OUTPATIENT
Start: 2021-09-08 | End: 2021-10-20 | Stop reason: SDUPTHER

## 2021-09-08 RX ORDER — CETIRIZINE HYDROCHLORIDE 10 MG/1
10 TABLET ORAL DAILY
Qty: 90 TABLET | Refills: 2 | Status: SHIPPED | OUTPATIENT
Start: 2021-09-08 | End: 2022-01-15 | Stop reason: SDUPTHER

## 2021-09-08 NOTE — PROGRESS NOTES
Office Note      Date: 2021  Patient Name: Elisabeth Martinez  MRN: [id number]  : 1995    Chief Complaint   Patient presents with   • Depression       History of Present Illness: Elisabeth Maritnez is a 26 y.o. female who presents for Depression.  Doing well on Wellbutrin 150 mg once daily.  Has not lost any weight.  Plan was to decrease Prozac due to weight gain side effects of this medication.  She would like annual physical exam blood work done today.  Concerned that hormones are causing her inability to lose weight.    Subjective      Review of Systems:   Pertinent review of systems per HPI.    Review of Systems   Constitutional: Negative for activity change, appetite change, chills, diaphoresis, fatigue, fever and unexpected weight change.   HENT: Negative for congestion, dental problem, drooling, ear discharge, ear pain, facial swelling, hearing loss and mouth sores.    Eyes: Negative for pain, discharge and itching.   Respiratory: Negative for apnea, cough, choking, chest tightness and shortness of breath.    Cardiovascular: Negative for chest pain, palpitations and leg swelling.   Gastrointestinal: Negative for abdominal distention, abdominal pain, blood in stool, constipation and diarrhea.   Endocrine: Negative for cold intolerance, heat intolerance, polydipsia and polyuria.   Genitourinary: Negative for difficulty urinating, dysuria, frequency and hematuria.   Skin: Negative for color change, pallor, rash and wound.   Allergic/Immunologic: Negative for environmental allergies, food allergies and immunocompromised state.   Neurological: Negative for dizziness, weakness and light-headedness.   Psychiatric/Behavioral: Negative for agitation, behavioral problems, confusion, decreased concentration and self-injury. The patient is not nervous/anxious.    All other systems reviewed and are negative.    No Known Allergies    Objective     Physical Exam:  Vital Signs:   Vitals:    21 0847   BP:  "116/70   Pulse: 113   Resp: 20   Temp: 96.9 °F (36.1 °C)   TempSrc: Temporal   SpO2: 99%   Weight: 91.2 kg (201 lb)   Height: 160 cm (63\")      Body mass index is 35.61 kg/m².    Physical Exam  Vitals and nursing note reviewed.   Constitutional:       General: She is not in acute distress.     Appearance: She is well-developed.   HENT:      Head: Normocephalic and atraumatic.      Right Ear: External ear normal.      Left Ear: External ear normal.   Eyes:      General:         Right eye: No discharge.         Left eye: No discharge.      Conjunctiva/sclera: Conjunctivae normal.   Cardiovascular:      Rate and Rhythm: Normal rate and regular rhythm.      Heart sounds: Normal heart sounds. No murmur heard.   No friction rub. No gallop.    Pulmonary:      Effort: Pulmonary effort is normal. No respiratory distress.      Breath sounds: Normal breath sounds. No wheezing or rales.   Skin:     General: Skin is warm and dry.      Coloration: Skin is not pale.         Assessment / Plan      Assessment & Plan:    1. Anxiety  Increase Wellbutrin to 300 mg and decrease Prozac to 20 mg.  Follow-up in 1 month to see how she is doing.  She is doing well can continue Wellbutrin 300 mg and Prozac 20 mg for 3 more months and then consider weaning off Prozac.  - buPROPion XL (Wellbutrin XL) 300 MG 24 hr tablet; Take 1 tablet by mouth Daily.  Dispense: 30 tablet; Refill: 3  - FLUoxetine (PROzac) 20 MG capsule; Take 1 capsule by mouth Daily.  Dispense: 30 capsule; Refill: 2    2. Annual physical exam  Counseled on:  Mammo starting at age 40  Pap smear q5 years if normal pap cytology with neg HPV, otherwise q3 years  Colonoscopy at 45-49 y/o  PNA vaccinations starting at age 65  shingrix at age 50  Td/Tdap q 10 years  Wear seatbelt when driving  Flu shot annually    - CBC (No Diff)  - Comprehensive Metabolic Panel  - Lipid Panel  - TSH Rfx On Abnormal To Free T4  - Vitamin B12  - Vitamin D 25 Hydroxy  - Hemoglobin A1c  - Hepatitis C " Antibody    3.  Obesity, BMI 35.61  Discussed weight gain which can be from exercise.  TSH normal 2018.  Will repeat today.  Follow-up in 1 month to reassess weight notably decreased Cece West MD  09/08/2021     Please note that portions of this note may have been completed with a voice recognition program. Efforts were made to edit the dictations, but occasionally words are mistranscribed.

## 2021-09-26 DIAGNOSIS — R73.03 PRE-DIABETES: ICD-10-CM

## 2021-09-26 DIAGNOSIS — G43.919 INTRACTABLE MIGRAINE WITHOUT STATUS MIGRAINOSUS, UNSPECIFIED MIGRAINE TYPE: ICD-10-CM

## 2021-09-26 DIAGNOSIS — F41.9 ANXIETY: ICD-10-CM

## 2021-09-27 RX ORDER — BUSPIRONE HYDROCHLORIDE 7.5 MG/1
7.5 TABLET ORAL DAILY
Qty: 30 TABLET | Refills: 2 | Status: SHIPPED | OUTPATIENT
Start: 2021-09-27 | End: 2021-10-06 | Stop reason: SDUPTHER

## 2021-09-27 RX ORDER — METFORMIN HYDROCHLORIDE 750 MG/1
750 TABLET, EXTENDED RELEASE ORAL
Qty: 30 TABLET | Refills: 2 | Status: SHIPPED | OUTPATIENT
Start: 2021-09-27 | End: 2021-10-20 | Stop reason: SDUPTHER

## 2021-09-27 RX ORDER — SUMATRIPTAN 100 MG/1
TABLET, FILM COATED ORAL
Qty: 9 TABLET | Refills: 6 | Status: SHIPPED | OUTPATIENT
Start: 2021-09-27 | End: 2021-10-20 | Stop reason: SDUPTHER

## 2021-10-06 DIAGNOSIS — F41.9 ANXIETY: ICD-10-CM

## 2021-10-06 DIAGNOSIS — J30.2 SEASONAL ALLERGIES: ICD-10-CM

## 2021-10-07 RX ORDER — MONTELUKAST SODIUM 10 MG/1
10 TABLET ORAL NIGHTLY
Qty: 30 TABLET | Refills: 2 | Status: SHIPPED | OUTPATIENT
Start: 2021-10-07 | End: 2021-11-07 | Stop reason: SDUPTHER

## 2021-10-07 RX ORDER — FLUTICASONE PROPIONATE 50 MCG
2 SPRAY, SUSPENSION (ML) NASAL DAILY
Qty: 11.1 ML | Refills: 10 | Status: SHIPPED | OUTPATIENT
Start: 2021-10-07 | End: 2021-11-07 | Stop reason: SDUPTHER

## 2021-10-07 RX ORDER — BUSPIRONE HYDROCHLORIDE 7.5 MG/1
7.5 TABLET ORAL DAILY
Qty: 30 TABLET | Refills: 2 | Status: SHIPPED | OUTPATIENT
Start: 2021-10-07 | End: 2021-11-07 | Stop reason: SDUPTHER

## 2021-10-20 DIAGNOSIS — R73.03 PRE-DIABETES: ICD-10-CM

## 2021-10-20 DIAGNOSIS — F41.9 ANXIETY: ICD-10-CM

## 2021-10-20 DIAGNOSIS — G43.919 INTRACTABLE MIGRAINE WITHOUT STATUS MIGRAINOSUS, UNSPECIFIED MIGRAINE TYPE: ICD-10-CM

## 2021-10-20 RX ORDER — SUMATRIPTAN 100 MG/1
TABLET, FILM COATED ORAL
Qty: 9 TABLET | Refills: 6 | Status: SHIPPED | OUTPATIENT
Start: 2021-10-20 | End: 2021-11-07 | Stop reason: SDUPTHER

## 2021-10-20 RX ORDER — METFORMIN HYDROCHLORIDE 750 MG/1
750 TABLET, EXTENDED RELEASE ORAL
Qty: 30 TABLET | Refills: 2 | Status: SHIPPED | OUTPATIENT
Start: 2021-10-20 | End: 2021-12-16 | Stop reason: SDUPTHER

## 2021-10-20 RX ORDER — FLUOXETINE HYDROCHLORIDE 20 MG/1
20 CAPSULE ORAL DAILY
Qty: 30 CAPSULE | Refills: 2 | Status: SHIPPED | OUTPATIENT
Start: 2021-10-20 | End: 2021-11-07 | Stop reason: SDUPTHER

## 2021-10-20 RX ORDER — BUPROPION HYDROCHLORIDE 300 MG/1
300 TABLET ORAL DAILY
Qty: 30 TABLET | Refills: 3 | Status: SHIPPED | OUTPATIENT
Start: 2021-10-20 | End: 2021-11-21 | Stop reason: SDUPTHER

## 2021-10-27 ENCOUNTER — TELEPHONE (OUTPATIENT)
Dept: INTERNAL MEDICINE | Facility: CLINIC | Age: 26
End: 2021-10-27

## 2021-10-27 NOTE — TELEPHONE ENCOUNTER
Caller: Elisabeth Martinez    Relationship: Self    Best call back number: [phone]    What is the best time to reach you: ANYTIME    Who are you requesting to speak with (clinical staff, provider,  specific staff member): CLINICAL STAFF OR PROVIDER    What was the call regarding: PATIENT STATES THAT SHE TESTED POSITIVE FOR COVID YESTERDAY AND IS REQUESTING THE MONOCLONAL INFUSION. PLEASE ADVISE    Do you require a callback: YES

## 2021-10-30 ENCOUNTER — APPOINTMENT (OUTPATIENT)
Dept: GENERAL RADIOLOGY | Facility: HOSPITAL | Age: 26
End: 2021-10-30

## 2021-10-30 ENCOUNTER — HOSPITAL ENCOUNTER (EMERGENCY)
Facility: HOSPITAL | Age: 26
Discharge: HOME | End: 2021-10-30
Attending: EMERGENCY MEDICINE | Admitting: EMERGENCY MEDICINE

## 2021-10-30 VITALS
OXYGEN SATURATION: 95 % | BODY MASS INDEX: 35.44 KG/M2 | DIASTOLIC BLOOD PRESSURE: 93 MMHG | TEMPERATURE: 98 F | WEIGHT: 200 LBS | HEART RATE: 105 BPM | HEIGHT: 63 IN | RESPIRATION RATE: 16 BRPM | SYSTOLIC BLOOD PRESSURE: 121 MMHG

## 2021-10-30 DIAGNOSIS — J20.8 ACUTE BRONCHITIS DUE TO COVID-19 VIRUS: Primary | ICD-10-CM

## 2021-10-30 DIAGNOSIS — U07.1 ACUTE BRONCHITIS DUE TO COVID-19 VIRUS: Primary | ICD-10-CM

## 2021-10-30 DIAGNOSIS — R07.89 CHEST WALL PAIN: ICD-10-CM

## 2021-10-30 DIAGNOSIS — R79.89 ELEVATED LFTS: ICD-10-CM

## 2021-10-30 LAB
ALBUMIN SERPL-MCNC: 4.4 G/DL (ref 3.5–5.2)
ALBUMIN/GLOB SERPL: 1.4 G/DL
ALP SERPL-CCNC: 132 U/L (ref 39–117)
ALT SERPL W P-5'-P-CCNC: 52 U/L (ref 1–33)
ANION GAP SERPL CALCULATED.3IONS-SCNC: 15 MMOL/L (ref 5–15)
AST SERPL-CCNC: 69 U/L (ref 1–32)
BASOPHILS # BLD AUTO: 0.05 10*3/MM3 (ref 0–0.2)
BASOPHILS NFR BLD AUTO: 0.6 % (ref 0–1.5)
BILIRUB SERPL-MCNC: 0.2 MG/DL (ref 0–1.2)
BUN SERPL-MCNC: 9 MG/DL (ref 6–20)
BUN/CREAT SERPL: 11.8 (ref 7–25)
CALCIUM SPEC-SCNC: 9.3 MG/DL (ref 8.6–10.5)
CHLORIDE SERPL-SCNC: 103 MMOL/L (ref 98–107)
CO2 SERPL-SCNC: 22 MMOL/L (ref 22–29)
CREAT SERPL-MCNC: 0.76 MG/DL (ref 0.57–1)
D DIMER PPP FEU-MCNC: 0.3 MCGFEU/ML (ref 0–0.56)
DEPRECATED RDW RBC AUTO: 45.1 FL (ref 37–54)
EOSINOPHIL # BLD AUTO: 0.24 10*3/MM3 (ref 0–0.4)
EOSINOPHIL NFR BLD AUTO: 3.1 % (ref 0.3–6.2)
ERYTHROCYTE [DISTWIDTH] IN BLOOD BY AUTOMATED COUNT: 15.9 % (ref 12.3–15.4)
GFR SERPL CREATININE-BSD FRML MDRD: 92 ML/MIN/1.73
GLOBULIN UR ELPH-MCNC: 3.2 GM/DL
GLUCOSE SERPL-MCNC: 96 MG/DL (ref 65–99)
HCT VFR BLD AUTO: 39.9 % (ref 34–46.6)
HGB BLD-MCNC: 12.2 G/DL (ref 12–15.9)
HOLD SPECIMEN: NORMAL
IMM GRANULOCYTES # BLD AUTO: 0.02 10*3/MM3 (ref 0–0.05)
IMM GRANULOCYTES NFR BLD AUTO: 0.3 % (ref 0–0.5)
LIPASE SERPL-CCNC: 40 U/L (ref 13–60)
LYMPHOCYTES # BLD AUTO: 3.36 10*3/MM3 (ref 0.7–3.1)
LYMPHOCYTES NFR BLD AUTO: 43.5 % (ref 19.6–45.3)
MCH RBC QN AUTO: 24.3 PG (ref 26.6–33)
MCHC RBC AUTO-ENTMCNC: 30.6 G/DL (ref 31.5–35.7)
MCV RBC AUTO: 79.3 FL (ref 79–97)
MONOCYTES # BLD AUTO: 0.54 10*3/MM3 (ref 0.1–0.9)
MONOCYTES NFR BLD AUTO: 7 % (ref 5–12)
NEUTROPHILS NFR BLD AUTO: 3.51 10*3/MM3 (ref 1.7–7)
NEUTROPHILS NFR BLD AUTO: 45.5 % (ref 42.7–76)
NRBC BLD AUTO-RTO: 0 /100 WBC (ref 0–0.2)
NT-PROBNP SERPL-MCNC: 8 PG/ML (ref 0–450)
PLATELET # BLD AUTO: 284 10*3/MM3 (ref 140–450)
PMV BLD AUTO: 11.2 FL (ref 6–12)
POTASSIUM SERPL-SCNC: 4 MMOL/L (ref 3.5–5.2)
PROT SERPL-MCNC: 7.6 G/DL (ref 6–8.5)
QT INTERVAL: 342 MS
QTC INTERVAL: 434 MS
RBC # BLD AUTO: 5.03 10*6/MM3 (ref 3.77–5.28)
SODIUM SERPL-SCNC: 140 MMOL/L (ref 136–145)
TROPONIN T SERPL-MCNC: <0.01 NG/ML (ref 0–0.03)
WBC # BLD AUTO: 7.72 10*3/MM3 (ref 3.4–10.8)
WHOLE BLOOD HOLD SPECIMEN: NORMAL
WHOLE BLOOD HOLD SPECIMEN: NORMAL

## 2021-10-30 PROCEDURE — 83880 ASSAY OF NATRIURETIC PEPTIDE: CPT | Performed by: EMERGENCY MEDICINE

## 2021-10-30 PROCEDURE — 83690 ASSAY OF LIPASE: CPT | Performed by: EMERGENCY MEDICINE

## 2021-10-30 PROCEDURE — 93005 ELECTROCARDIOGRAM TRACING: CPT | Performed by: EMERGENCY MEDICINE

## 2021-10-30 PROCEDURE — 84484 ASSAY OF TROPONIN QUANT: CPT | Performed by: EMERGENCY MEDICINE

## 2021-10-30 PROCEDURE — 80053 COMPREHEN METABOLIC PANEL: CPT | Performed by: EMERGENCY MEDICINE

## 2021-10-30 PROCEDURE — 85379 FIBRIN DEGRADATION QUANT: CPT | Performed by: EMERGENCY MEDICINE

## 2021-10-30 PROCEDURE — 85025 COMPLETE CBC W/AUTO DIFF WBC: CPT | Performed by: EMERGENCY MEDICINE

## 2021-10-30 PROCEDURE — 63710000001 PREDNISONE PER 1 MG: Performed by: EMERGENCY MEDICINE

## 2021-10-30 PROCEDURE — 71045 X-RAY EXAM CHEST 1 VIEW: CPT

## 2021-10-30 PROCEDURE — 99283 EMERGENCY DEPT VISIT LOW MDM: CPT

## 2021-10-30 RX ORDER — SODIUM CHLORIDE 0.9 % (FLUSH) 0.9 %
10 SYRINGE (ML) INJECTION AS NEEDED
Status: DISCONTINUED | OUTPATIENT
Start: 2021-10-30 | End: 2021-10-31 | Stop reason: HOSPADM

## 2021-10-30 RX ORDER — PREDNISONE 20 MG/1
20 TABLET ORAL ONCE
Status: COMPLETED | OUTPATIENT
Start: 2021-10-30 | End: 2021-10-30

## 2021-10-30 RX ORDER — METHYLPREDNISOLONE 4 MG/1
TABLET ORAL
Qty: 21 TABLET | Refills: 0 | Status: SHIPPED | OUTPATIENT
Start: 2021-10-30 | End: 2022-01-04

## 2021-10-30 RX ADMIN — PREDNISONE 20 MG: 20 TABLET ORAL at 22:05

## 2021-11-07 DIAGNOSIS — F41.9 ANXIETY: ICD-10-CM

## 2021-11-07 DIAGNOSIS — J30.2 SEASONAL ALLERGIES: ICD-10-CM

## 2021-11-07 DIAGNOSIS — G43.919 INTRACTABLE MIGRAINE WITHOUT STATUS MIGRAINOSUS, UNSPECIFIED MIGRAINE TYPE: ICD-10-CM

## 2021-11-08 RX ORDER — SUMATRIPTAN 100 MG/1
TABLET, FILM COATED ORAL
Qty: 9 TABLET | Refills: 6 | Status: SHIPPED | OUTPATIENT
Start: 2021-11-08 | End: 2021-12-16 | Stop reason: SDUPTHER

## 2021-11-08 RX ORDER — MONTELUKAST SODIUM 10 MG/1
10 TABLET ORAL NIGHTLY
Qty: 30 TABLET | Refills: 2 | Status: SHIPPED | OUTPATIENT
Start: 2021-11-08 | End: 2021-12-16 | Stop reason: SDUPTHER

## 2021-11-08 RX ORDER — BUSPIRONE HYDROCHLORIDE 7.5 MG/1
7.5 TABLET ORAL DAILY
Qty: 30 TABLET | Refills: 2 | Status: SHIPPED | OUTPATIENT
Start: 2021-11-08 | End: 2021-11-21 | Stop reason: SDUPTHER

## 2021-11-08 RX ORDER — FLUOXETINE HYDROCHLORIDE 20 MG/1
20 CAPSULE ORAL DAILY
Qty: 30 CAPSULE | Refills: 2 | Status: SHIPPED | OUTPATIENT
Start: 2021-11-08 | End: 2021-12-16 | Stop reason: SDUPTHER

## 2021-11-08 RX ORDER — FLUTICASONE PROPIONATE 50 MCG
2 SPRAY, SUSPENSION (ML) NASAL DAILY
Qty: 11.1 ML | Refills: 10 | Status: SHIPPED | OUTPATIENT
Start: 2021-11-08 | End: 2021-12-16 | Stop reason: SDUPTHER

## 2021-11-21 DIAGNOSIS — F41.9 ANXIETY: ICD-10-CM

## 2021-11-22 RX ORDER — BUSPIRONE HYDROCHLORIDE 7.5 MG/1
7.5 TABLET ORAL DAILY
Qty: 30 TABLET | Refills: 3 | Status: SHIPPED | OUTPATIENT
Start: 2021-11-22 | End: 2022-01-05 | Stop reason: SDUPTHER

## 2021-11-22 RX ORDER — BUPROPION HYDROCHLORIDE 300 MG/1
300 TABLET ORAL DAILY
Qty: 30 TABLET | Refills: 3 | Status: SHIPPED | OUTPATIENT
Start: 2021-11-22 | End: 2021-12-21 | Stop reason: SDUPTHER

## 2021-12-16 DIAGNOSIS — F41.9 ANXIETY: ICD-10-CM

## 2021-12-16 DIAGNOSIS — R73.03 PRE-DIABETES: ICD-10-CM

## 2021-12-16 DIAGNOSIS — G43.919 INTRACTABLE MIGRAINE WITHOUT STATUS MIGRAINOSUS, UNSPECIFIED MIGRAINE TYPE: ICD-10-CM

## 2021-12-16 DIAGNOSIS — J30.2 SEASONAL ALLERGIES: ICD-10-CM

## 2021-12-16 RX ORDER — METFORMIN HYDROCHLORIDE 750 MG/1
750 TABLET, EXTENDED RELEASE ORAL
Qty: 30 TABLET | Refills: 2 | Status: SHIPPED | OUTPATIENT
Start: 2021-12-16 | End: 2022-01-15 | Stop reason: SDUPTHER

## 2021-12-16 RX ORDER — FLUTICASONE PROPIONATE 50 MCG
2 SPRAY, SUSPENSION (ML) NASAL DAILY
Qty: 11.1 ML | Refills: 10 | Status: SHIPPED | OUTPATIENT
Start: 2021-12-16 | End: 2022-01-05 | Stop reason: SDUPTHER

## 2021-12-16 RX ORDER — MONTELUKAST SODIUM 10 MG/1
10 TABLET ORAL NIGHTLY
Qty: 30 TABLET | Refills: 2 | Status: SHIPPED | OUTPATIENT
Start: 2021-12-16 | End: 2022-01-15 | Stop reason: SDUPTHER

## 2021-12-16 RX ORDER — SUMATRIPTAN 100 MG/1
TABLET, FILM COATED ORAL
Qty: 9 TABLET | Refills: 6 | Status: SHIPPED | OUTPATIENT
Start: 2021-12-16 | End: 2022-01-09 | Stop reason: SDUPTHER

## 2021-12-16 RX ORDER — FLUOXETINE HYDROCHLORIDE 20 MG/1
20 CAPSULE ORAL DAILY
Qty: 30 CAPSULE | Refills: 2 | Status: SHIPPED | OUTPATIENT
Start: 2021-12-16 | End: 2022-01-04

## 2021-12-21 DIAGNOSIS — F41.9 ANXIETY: ICD-10-CM

## 2021-12-21 RX ORDER — BUPROPION HYDROCHLORIDE 300 MG/1
300 TABLET ORAL DAILY
Qty: 30 TABLET | Refills: 3 | Status: SHIPPED | OUTPATIENT
Start: 2021-12-21 | End: 2022-01-15 | Stop reason: SDUPTHER

## 2022-01-04 ENCOUNTER — OFFICE VISIT (OUTPATIENT)
Dept: INTERNAL MEDICINE | Facility: CLINIC | Age: 27
End: 2022-01-04

## 2022-01-04 VITALS
BODY MASS INDEX: 36.14 KG/M2 | HEIGHT: 63 IN | RESPIRATION RATE: 18 BRPM | DIASTOLIC BLOOD PRESSURE: 78 MMHG | OXYGEN SATURATION: 99 % | SYSTOLIC BLOOD PRESSURE: 128 MMHG | WEIGHT: 204 LBS | TEMPERATURE: 97.3 F

## 2022-01-04 DIAGNOSIS — R73.03 PRE-DIABETES: ICD-10-CM

## 2022-01-04 DIAGNOSIS — E66.01 CLASS 2 SEVERE OBESITY DUE TO EXCESS CALORIES WITH SERIOUS COMORBIDITY AND BODY MASS INDEX (BMI) OF 36.0 TO 36.9 IN ADULT: ICD-10-CM

## 2022-01-04 DIAGNOSIS — F41.9 ANXIETY: Primary | ICD-10-CM

## 2022-01-04 PROCEDURE — 99214 OFFICE O/P EST MOD 30 MIN: CPT | Performed by: INTERNAL MEDICINE

## 2022-01-04 RX ORDER — VILAZODONE HYDROCHLORIDE 20 MG/1
20 TABLET ORAL DAILY
Qty: 30 TABLET | Refills: 2 | Status: SHIPPED | OUTPATIENT
Start: 2022-01-04 | End: 2022-02-17 | Stop reason: SDUPTHER

## 2022-01-04 NOTE — PROGRESS NOTES
Office Note      Date: 2022  Patient Name: Elisabeth Martinez  MRN: [id number]  : 1995    Chief Complaint   Patient presents with   • Anxiety     decrease prozac increase wellburtrin?   • Menstrual Problem       History of Present Illness: Elisabeth Martinez is a 26 y.o. female who presents for Anxiety (decrease prozac increase wellburtrin?) and Menstrual Problem.  Getting  this year.  Has some anxiety.  Frustrated over weight.    Subjective      Review of Systems:   Pertinent review of systems per HPI.    Review of Systems   Constitutional: Negative for activity change, appetite change, chills, diaphoresis, fatigue, fever and unexpected weight change.   HENT: Negative for congestion, dental problem, drooling, ear discharge, ear pain, facial swelling, hearing loss and mouth sores.    Eyes: Negative for pain, discharge and itching.   Respiratory: Negative for apnea, cough, choking, chest tightness and shortness of breath.    Cardiovascular: Negative for chest pain, palpitations and leg swelling.   Gastrointestinal: Negative for abdominal distention, abdominal pain, blood in stool, constipation and diarrhea.   Endocrine: Negative for cold intolerance, heat intolerance, polydipsia and polyuria.   Genitourinary: Negative for difficulty urinating, dysuria, frequency and hematuria.   Skin: Negative for color change, pallor, rash and wound.   Allergic/Immunologic: Negative for environmental allergies, food allergies and immunocompromised state.   Neurological: Negative for dizziness, weakness and light-headedness.   Psychiatric/Behavioral: Negative for agitation, behavioral problems, confusion, decreased concentration and self-injury. The patient is nervous/anxious.    All other systems reviewed and are negative.    No Known Allergies    Objective     Physical Exam:  Vital Signs:   Vitals:    22 1513   BP: 128/78   Resp: 18   Temp: 97.3 °F (36.3 °C)   TempSrc: Temporal   SpO2: 99%   Weight:  "92.5 kg (204 lb)   Height: 160 cm (63\")      Body mass index is 36.14 kg/m².    Physical Exam  Vitals and nursing note reviewed.   Constitutional:       General: She is not in acute distress.     Appearance: She is well-developed.   HENT:      Head: Normocephalic and atraumatic.      Right Ear: External ear normal.      Left Ear: External ear normal.   Eyes:      General: No scleral icterus.        Right eye: No discharge.         Left eye: No discharge.      Conjunctiva/sclera: Conjunctivae normal.   Cardiovascular:      Rate and Rhythm: Normal rate and regular rhythm.      Heart sounds: Normal heart sounds. No murmur heard.  No friction rub. No gallop.    Pulmonary:      Effort: Pulmonary effort is normal. No respiratory distress.      Breath sounds: Normal breath sounds. No wheezing or rales.   Skin:     General: Skin is warm and dry.      Coloration: Skin is not pale.         Assessment / Plan      Assessment & Plan:    1. Anxiety  2. Class 2 severe obesity due to excess calories with serious comorbidity and body mass index (BMI) of 36.0 to 36.9 in adult (HCC)  3. Pre-diabetes      Discussed weight gain side effects of BuSpar.  Continue Wellbutrin 300 mg once daily.  Start on Viibryd, given starter pack in clinic.  Goal is to eventually come off BuSpar.  For weight discussed diet, exercise, lifestyle modifications.  Discussed A1c from last visit.  Rx for Monster West MD  01/04/2022   "

## 2022-01-05 DIAGNOSIS — F41.9 ANXIETY: ICD-10-CM

## 2022-01-05 RX ORDER — FLUTICASONE PROPIONATE 50 MCG
2 SPRAY, SUSPENSION (ML) NASAL DAILY
Qty: 11.1 ML | Refills: 10 | Status: SHIPPED | OUTPATIENT
Start: 2022-01-05 | End: 2022-01-15 | Stop reason: SDUPTHER

## 2022-01-05 RX ORDER — BUSPIRONE HYDROCHLORIDE 7.5 MG/1
7.5 TABLET ORAL DAILY
Qty: 30 TABLET | Refills: 3 | Status: SHIPPED | OUTPATIENT
Start: 2022-01-05 | End: 2022-02-01 | Stop reason: SDUPTHER

## 2022-01-09 DIAGNOSIS — G43.919 INTRACTABLE MIGRAINE WITHOUT STATUS MIGRAINOSUS, UNSPECIFIED MIGRAINE TYPE: ICD-10-CM

## 2022-01-10 DIAGNOSIS — E66.9 OBESITY (BMI 35.0-39.9 WITHOUT COMORBIDITY): Primary | ICD-10-CM

## 2022-01-10 RX ORDER — PHENTERMINE HYDROCHLORIDE 15 MG/1
15 CAPSULE ORAL EVERY MORNING
Qty: 30 CAPSULE | Refills: 2 | Status: SHIPPED | OUTPATIENT
Start: 2022-01-10 | End: 2022-02-02

## 2022-01-10 RX ORDER — SUMATRIPTAN 100 MG/1
TABLET, FILM COATED ORAL
Qty: 9 TABLET | Refills: 6 | Status: SHIPPED | OUTPATIENT
Start: 2022-01-10 | End: 2022-02-01 | Stop reason: SDUPTHER

## 2022-01-10 NOTE — TELEPHONE ENCOUNTER
Rx Refill Note  Requested Prescriptions     Pending Prescriptions Disp Refills   • SUMAtriptan (IMITREX) 100 MG tablet 9 tablet 6     Sig: Take 1 TAB @the onset of Migraine as needed.      Last office visit with prescribing clinician: 1/4/2022      Next office visit with prescribing clinician: 2/8/2022            Constance Puentes MA  01/10/22, 09:21 EST

## 2022-01-15 DIAGNOSIS — J30.2 SEASONAL ALLERGIES: ICD-10-CM

## 2022-01-15 DIAGNOSIS — F41.9 ANXIETY: ICD-10-CM

## 2022-01-15 DIAGNOSIS — R73.03 PRE-DIABETES: ICD-10-CM

## 2022-01-17 RX ORDER — MONTELUKAST SODIUM 10 MG/1
10 TABLET ORAL NIGHTLY
Qty: 30 TABLET | Refills: 2 | Status: SHIPPED | OUTPATIENT
Start: 2022-01-17 | End: 2022-02-17 | Stop reason: SDUPTHER

## 2022-01-17 RX ORDER — FLUTICASONE PROPIONATE 50 MCG
2 SPRAY, SUSPENSION (ML) NASAL DAILY
Qty: 11.1 ML | Refills: 10 | Status: SHIPPED | OUTPATIENT
Start: 2022-01-17 | End: 2022-02-01 | Stop reason: SDUPTHER

## 2022-01-17 RX ORDER — CETIRIZINE HYDROCHLORIDE 10 MG/1
10 TABLET ORAL DAILY
Qty: 90 TABLET | Refills: 2 | Status: SHIPPED | OUTPATIENT
Start: 2022-01-17 | End: 2022-04-15 | Stop reason: SDUPTHER

## 2022-01-17 RX ORDER — BUPROPION HYDROCHLORIDE 300 MG/1
300 TABLET ORAL DAILY
Qty: 30 TABLET | Refills: 3 | Status: SHIPPED | OUTPATIENT
Start: 2022-01-17 | End: 2022-02-17 | Stop reason: SDUPTHER

## 2022-01-17 RX ORDER — METFORMIN HYDROCHLORIDE 750 MG/1
750 TABLET, EXTENDED RELEASE ORAL
Qty: 30 TABLET | Refills: 2 | Status: SHIPPED | OUTPATIENT
Start: 2022-01-17 | End: 2022-03-01 | Stop reason: SDUPTHER

## 2022-02-01 DIAGNOSIS — F41.9 ANXIETY: ICD-10-CM

## 2022-02-01 DIAGNOSIS — G43.919 INTRACTABLE MIGRAINE WITHOUT STATUS MIGRAINOSUS, UNSPECIFIED MIGRAINE TYPE: ICD-10-CM

## 2022-02-01 RX ORDER — FLUTICASONE PROPIONATE 50 MCG
2 SPRAY, SUSPENSION (ML) NASAL DAILY
Qty: 11.1 ML | Refills: 10 | Status: SHIPPED | OUTPATIENT
Start: 2022-02-01 | End: 2022-02-17 | Stop reason: SDUPTHER

## 2022-02-01 RX ORDER — SUMATRIPTAN 100 MG/1
TABLET, FILM COATED ORAL
Qty: 9 TABLET | Refills: 6 | Status: SHIPPED | OUTPATIENT
Start: 2022-02-01 | End: 2022-03-01 | Stop reason: SDUPTHER

## 2022-02-01 RX ORDER — BUSPIRONE HYDROCHLORIDE 7.5 MG/1
7.5 TABLET ORAL DAILY
Qty: 30 TABLET | Refills: 3 | Status: SHIPPED | OUTPATIENT
Start: 2022-02-01 | End: 2022-03-11 | Stop reason: SDUPTHER

## 2022-02-02 ENCOUNTER — OFFICE VISIT (OUTPATIENT)
Dept: NEUROLOGY | Facility: CLINIC | Age: 27
End: 2022-02-02

## 2022-02-02 VITALS — HEART RATE: 137 BPM | WEIGHT: 205 LBS | HEIGHT: 63 IN | OXYGEN SATURATION: 97 % | BODY MASS INDEX: 36.32 KG/M2

## 2022-02-02 DIAGNOSIS — G43.019 INTRACTABLE MIGRAINE WITHOUT AURA AND WITHOUT STATUS MIGRAINOSUS: Primary | ICD-10-CM

## 2022-02-02 PROCEDURE — 99214 OFFICE O/P EST MOD 30 MIN: CPT | Performed by: PSYCHIATRY & NEUROLOGY

## 2022-02-02 RX ORDER — FREMANEZUMAB-VFRM 225 MG/1.5ML
225 INJECTION SUBCUTANEOUS
Qty: 1.5 ML | Refills: 11 | Status: SHIPPED | OUTPATIENT
Start: 2022-02-02 | End: 2022-05-18 | Stop reason: SDUPTHER

## 2022-02-02 NOTE — PROGRESS NOTES
Subjective:    CC: Elisabeth Martinez is in clinic today for follow up for intractable migraine.    HPI:  Follow-up: 7/10/2019: She is in clinic for regular follow-up.  Since her last visit, her insurance did approve Aimovig 140 mg subcutaneous injection.  She has done total of 2 injections since her last visit and reports that she is doing really well.  She has had only one headache in the last 2 months for recheck to take sumatriptan.  She is tolerating Aimovig well without any side effects.    Follow-up: 1/4/2021: She is in clinic for follow-up after July 2019.  She reports that she ran out of her Aimovig in August 2020 and her primary care physician's office could not refill it so since then she is not taking.  Since running out of Aimovig in August, she reports that the migraine intensity and frequency have increased and currently she is experiencing 8-9 breakthrough migraine in a month.  While on Aimovig, it would reduce to 1-2 migraines in a month.  She is currently taking sumatriptan 100 mg as needed as an abortive treatment and it is working well.  She wants to go back on Aimovig.    5/6/2021: She is in clinic for regular follow-up.  Since her last visit in January, she has now restarted Aimovig monthly injections and have done total 3 injections.  With Aimovig, she has again had excellent response and the migraine intensity and frequency has reduced to 1-2 migraines in a month.  She is using Imitrex 100 mg as needed as an abortive treatment and it works well.    2/2/2022: She is in clinic for regular follow-up.  Since her last visit in May 2021, she reports that after August 2021, insurance stopped covering Aimovig so she has not done any more injections since August.  She reports that the migraines have increased in frequency and intensity while with on average 10-12 migraine days in a month.  She is taking sumatriptan frequently and possibly has developed a rebound headaches as well.  She reports that  Imitrex does work well as an abortive treatment.    The following portions of the patient's history were reviewed and updated as of 02/02/2022: allergies, social history and problem list.       Current Outpatient Medications:   •  buPROPion XL (Wellbutrin XL) 300 MG 24 hr tablet, Take 1 tablet by mouth Daily., Disp: 30 tablet, Rfl: 3  •  busPIRone (BUSPAR) 7.5 MG tablet, Take 1 tablet by mouth Daily., Disp: 30 tablet, Rfl: 3  •  cetirizine (zyrTEC) 10 MG tablet, Take 1 tablet by mouth Daily., Disp: 90 tablet, Rfl: 2  •  fluticasone (FLONASE) 50 MCG/ACT nasal spray, 2 sprays into the nostril(s) as directed by provider Daily., Disp: 11.1 mL, Rfl: 10  •  metFORMIN ER (GLUCOPHAGE-XR) 750 MG 24 hr tablet, Take 1 tablet by mouth Daily With Breakfast., Disp: 30 tablet, Rfl: 2  •  montelukast (SINGULAIR) 10 MG tablet, Take 1 tablet by mouth Every Night., Disp: 30 tablet, Rfl: 2  •  SUMAtriptan (IMITREX) 100 MG tablet, Take 1 TAB @the onset of Migraine as needed., Disp: 9 tablet, Rfl: 6  •  vilazodone (VIIBRYD) 20 MG tablet tablet, Take 1 tablet by mouth Daily., Disp: 30 tablet, Rfl: 2  •  Fremanezumab-vfrm (Ajovy) 225 MG/1.5ML solution auto-injector, Inject 225 mg under the skin into the appropriate area as directed Every 30 (Thirty) Days., Disp: 1.5 mL, Rfl: 11   Past Medical History:   Diagnosis Date   • Anxiety    • Migraine    • PCOS (polycystic ovarian syndrome)       History reviewed. No pertinent surgical history.   Family History   Problem Relation Age of Onset   • Seizures Mother    • Arthritis Mother    • Heart disease Maternal Grandmother    • Stroke Maternal Grandmother    • Cancer Maternal Grandfather    • Diabetes Maternal Grandfather         Review of Systems   Constitutional: Negative.    HENT: Negative.    Eyes: Negative.    Respiratory: Negative.    Cardiovascular: Negative.    Gastrointestinal: Negative.    Endocrine: Negative.    Genitourinary: Negative.    Musculoskeletal: Negative.    Skin: Negative.   "  Allergic/Immunologic: Negative.    Neurological: Positive for headache.   Hematological: Negative.    Psychiatric/Behavioral: Negative.      Objective:    Pulse (!) 137   Ht 160 cm (63\")   Wt 93 kg (205 lb)   SpO2 97%   BMI 36.31 kg/m²     Neurology Exam:  General apperance: NAD.     Mental status: Alert, awake and oriented to time place and person.    Recent and Remote memory: Can recall 3/3 objects at 5 minutes. Can recall historical events.     Attention span and Concentration: Serial 7s: Normal.     Fund of knowledge:  Normal.     Language and Speech: No aphasia or dysarthria.    Naming , Repitition and Comprehension:  Can name objects, repeat a sentence and follow commands. Speech is clear and fluent with good repetition, comprehension, and naming.    CN II to XII: Intact.    Opthalmoscopic Exam: No papilledema.    Motor:  Right UE muscle strength 5/5. Normal tone.     Left UE muscle strength 5/5. Normal tone.      Right LE muscle strength5/5. Normal tone.     Left LE muscle strength 5/5. Normal tone.      Sensory: Normal light touch, vibration and pinprick sensation bilaterally.    DTRs: 2+ bilaterally.    Babinski: Negative bilaterally.    Co-ordination: Normal finger-to-nose, heel to shin B/L.    Rhomberg: Negative.    Gait: Normal.    Cardiovascular: Regular rate and rhythm without murmur, gallop or rub.    Assessment and Plan:  1. Intractable migraine without aura and without status migrainosus  -Aimovi is working really well for migraine preventative treatment but insurance is not approving.  I am going to start her on Ajovy monthly injection and hopefully it will help reduce migraine frequency and intensity.  Currently she is reporting 10-12 migraine days in a month.  Today in clinic, sample Ajovy injection was given.  Continue with Imitrex as needed as an abortive treatment and I will see her back in clinic in 6 to 8 weeks for follow-up.       I spent 30 minutes face to face with the patient and " spent more than 50% of this time  in management, instructions and education regarding above mentioned diagnosis and also on counseling and discussing about taking medication regularly, possible side effects with medication use, importance of good sleep hygiene, good hydration and regular exercise.    Return in about 8 weeks (around 3/30/2022).

## 2022-02-17 DIAGNOSIS — J30.2 SEASONAL ALLERGIES: ICD-10-CM

## 2022-02-17 DIAGNOSIS — F41.9 ANXIETY: ICD-10-CM

## 2022-02-17 RX ORDER — BUPROPION HYDROCHLORIDE 300 MG/1
300 TABLET ORAL DAILY
Qty: 30 TABLET | Refills: 3 | Status: SHIPPED | OUTPATIENT
Start: 2022-02-17 | End: 2022-03-20 | Stop reason: SDUPTHER

## 2022-02-17 RX ORDER — VILAZODONE HYDROCHLORIDE 20 MG/1
20 TABLET ORAL DAILY
Qty: 30 TABLET | Refills: 2 | Status: SHIPPED | OUTPATIENT
Start: 2022-02-17 | End: 2022-03-11 | Stop reason: SDUPTHER

## 2022-02-17 RX ORDER — FLUTICASONE PROPIONATE 50 MCG
2 SPRAY, SUSPENSION (ML) NASAL DAILY
Qty: 11.1 ML | Refills: 10 | Status: SHIPPED | OUTPATIENT
Start: 2022-02-17 | End: 2022-03-01 | Stop reason: SDUPTHER

## 2022-02-17 RX ORDER — MONTELUKAST SODIUM 10 MG/1
10 TABLET ORAL NIGHTLY
Qty: 30 TABLET | Refills: 2 | Status: SHIPPED | OUTPATIENT
Start: 2022-02-17 | End: 2022-03-20 | Stop reason: SDUPTHER

## 2022-03-01 DIAGNOSIS — R73.03 PRE-DIABETES: ICD-10-CM

## 2022-03-01 DIAGNOSIS — G43.919 INTRACTABLE MIGRAINE WITHOUT STATUS MIGRAINOSUS, UNSPECIFIED MIGRAINE TYPE: ICD-10-CM

## 2022-03-01 RX ORDER — FLUTICASONE PROPIONATE 50 MCG
2 SPRAY, SUSPENSION (ML) NASAL DAILY
Qty: 11.1 ML | Refills: 10 | Status: SHIPPED | OUTPATIENT
Start: 2022-03-01 | End: 2022-04-01 | Stop reason: SDUPTHER

## 2022-03-01 RX ORDER — METFORMIN HYDROCHLORIDE 750 MG/1
750 TABLET, EXTENDED RELEASE ORAL
Qty: 30 TABLET | Refills: 2 | Status: SHIPPED | OUTPATIENT
Start: 2022-03-01 | End: 2022-04-01 | Stop reason: SDUPTHER

## 2022-03-01 RX ORDER — SUMATRIPTAN 100 MG/1
TABLET, FILM COATED ORAL
Qty: 9 TABLET | Refills: 6 | Status: SHIPPED | OUTPATIENT
Start: 2022-03-01 | End: 2022-04-01 | Stop reason: SDUPTHER

## 2022-03-11 DIAGNOSIS — F41.9 ANXIETY: ICD-10-CM

## 2022-03-11 RX ORDER — VILAZODONE HYDROCHLORIDE 20 MG/1
20 TABLET ORAL DAILY
Qty: 30 TABLET | Refills: 1 | Status: SHIPPED | OUTPATIENT
Start: 2022-03-11 | End: 2022-04-15 | Stop reason: SDUPTHER

## 2022-03-11 RX ORDER — BUSPIRONE HYDROCHLORIDE 7.5 MG/1
7.5 TABLET ORAL DAILY
Qty: 30 TABLET | Refills: 1 | Status: SHIPPED | OUTPATIENT
Start: 2022-03-11 | End: 2022-05-02 | Stop reason: SDUPTHER

## 2022-03-11 RX ORDER — FREMANEZUMAB-VFRM 225 MG/1.5ML
225 INJECTION SUBCUTANEOUS
Qty: 1.5 ML | Refills: 11 | OUTPATIENT
Start: 2022-03-11 | End: 2023-03-11

## 2022-03-20 DIAGNOSIS — J30.2 SEASONAL ALLERGIES: ICD-10-CM

## 2022-03-20 DIAGNOSIS — F41.9 ANXIETY: ICD-10-CM

## 2022-03-21 RX ORDER — BUPROPION HYDROCHLORIDE 300 MG/1
300 TABLET ORAL DAILY
Qty: 30 TABLET | Refills: 3 | Status: SHIPPED | OUTPATIENT
Start: 2022-03-21 | End: 2022-04-15 | Stop reason: SDUPTHER

## 2022-03-21 RX ORDER — MONTELUKAST SODIUM 10 MG/1
10 TABLET ORAL NIGHTLY
Qty: 30 TABLET | Refills: 2 | OUTPATIENT
Start: 2022-03-21

## 2022-03-21 RX ORDER — MONTELUKAST SODIUM 10 MG/1
10 TABLET ORAL NIGHTLY
Qty: 30 TABLET | Refills: 2 | Status: SHIPPED | OUTPATIENT
Start: 2022-03-21 | End: 2022-04-24 | Stop reason: SDUPTHER

## 2022-04-01 DIAGNOSIS — G43.919 INTRACTABLE MIGRAINE WITHOUT STATUS MIGRAINOSUS, UNSPECIFIED MIGRAINE TYPE: ICD-10-CM

## 2022-04-01 DIAGNOSIS — R73.03 PRE-DIABETES: ICD-10-CM

## 2022-04-01 RX ORDER — FLUTICASONE PROPIONATE 50 MCG
2 SPRAY, SUSPENSION (ML) NASAL DAILY
Qty: 11.1 ML | Refills: 10 | Status: SHIPPED | OUTPATIENT
Start: 2022-04-01 | End: 2022-04-15 | Stop reason: SDUPTHER

## 2022-04-01 RX ORDER — METFORMIN HYDROCHLORIDE 750 MG/1
750 TABLET, EXTENDED RELEASE ORAL
Qty: 30 TABLET | Refills: 2 | Status: SHIPPED | OUTPATIENT
Start: 2022-04-01 | End: 2022-04-15 | Stop reason: SDUPTHER

## 2022-04-01 RX ORDER — SUMATRIPTAN 100 MG/1
TABLET, FILM COATED ORAL
Qty: 9 TABLET | Refills: 6 | Status: SHIPPED | OUTPATIENT
Start: 2022-04-01 | End: 2022-05-02 | Stop reason: SDUPTHER

## 2022-04-01 RX ORDER — FREMANEZUMAB-VFRM 225 MG/1.5ML
225 INJECTION SUBCUTANEOUS
Qty: 1.5 ML | Refills: 11 | OUTPATIENT
Start: 2022-04-01 | End: 2023-04-01

## 2022-04-15 DIAGNOSIS — R73.03 PRE-DIABETES: ICD-10-CM

## 2022-04-15 DIAGNOSIS — F41.9 ANXIETY: ICD-10-CM

## 2022-04-15 RX ORDER — FLUTICASONE PROPIONATE 50 MCG
2 SPRAY, SUSPENSION (ML) NASAL DAILY
Qty: 11.1 ML | Refills: 10 | Status: SHIPPED | OUTPATIENT
Start: 2022-04-15 | End: 2022-05-02 | Stop reason: SDUPTHER

## 2022-04-15 RX ORDER — CETIRIZINE HYDROCHLORIDE 10 MG/1
10 TABLET ORAL DAILY
Qty: 90 TABLET | Refills: 2 | Status: SHIPPED | OUTPATIENT
Start: 2022-04-15 | End: 2022-05-02 | Stop reason: SDUPTHER

## 2022-04-15 RX ORDER — VILAZODONE HYDROCHLORIDE 20 MG/1
20 TABLET ORAL DAILY
Qty: 30 TABLET | Refills: 2 | Status: SHIPPED | OUTPATIENT
Start: 2022-04-15 | End: 2022-05-18 | Stop reason: SDUPTHER

## 2022-04-15 RX ORDER — METFORMIN HYDROCHLORIDE 750 MG/1
750 TABLET, EXTENDED RELEASE ORAL
Qty: 30 TABLET | Refills: 2 | Status: SHIPPED | OUTPATIENT
Start: 2022-04-15 | End: 2022-05-02 | Stop reason: SDUPTHER

## 2022-04-15 RX ORDER — BUPROPION HYDROCHLORIDE 300 MG/1
300 TABLET ORAL DAILY
Qty: 30 TABLET | Refills: 2 | Status: SHIPPED | OUTPATIENT
Start: 2022-04-15 | End: 2022-05-18 | Stop reason: SDUPTHER

## 2022-04-15 RX ORDER — FREMANEZUMAB-VFRM 225 MG/1.5ML
225 INJECTION SUBCUTANEOUS
Qty: 1.5 ML | Refills: 11 | OUTPATIENT
Start: 2022-04-15 | End: 2023-04-15

## 2022-04-24 DIAGNOSIS — J30.2 SEASONAL ALLERGIES: ICD-10-CM

## 2022-04-25 RX ORDER — MONTELUKAST SODIUM 10 MG/1
10 TABLET ORAL NIGHTLY
Qty: 30 TABLET | Refills: 2 | Status: SHIPPED | OUTPATIENT
Start: 2022-04-25 | End: 2022-05-30 | Stop reason: SDUPTHER

## 2022-05-02 DIAGNOSIS — G43.919 INTRACTABLE MIGRAINE WITHOUT STATUS MIGRAINOSUS, UNSPECIFIED MIGRAINE TYPE: ICD-10-CM

## 2022-05-02 DIAGNOSIS — R73.03 PRE-DIABETES: ICD-10-CM

## 2022-05-02 DIAGNOSIS — F41.9 ANXIETY: ICD-10-CM

## 2022-05-02 RX ORDER — BUSPIRONE HYDROCHLORIDE 7.5 MG/1
7.5 TABLET ORAL DAILY
Qty: 30 TABLET | Refills: 1 | Status: SHIPPED | OUTPATIENT
Start: 2022-05-02 | End: 2022-05-30 | Stop reason: SDUPTHER

## 2022-05-02 RX ORDER — METFORMIN HYDROCHLORIDE 750 MG/1
750 TABLET, EXTENDED RELEASE ORAL
Qty: 30 TABLET | Refills: 1 | Status: SHIPPED | OUTPATIENT
Start: 2022-05-02 | End: 2022-05-30 | Stop reason: SDUPTHER

## 2022-05-02 RX ORDER — CETIRIZINE HYDROCHLORIDE 10 MG/1
10 TABLET ORAL DAILY
Qty: 90 TABLET | Refills: 1 | Status: SHIPPED | OUTPATIENT
Start: 2022-05-02 | End: 2022-05-09 | Stop reason: SDUPTHER

## 2022-05-02 RX ORDER — SUMATRIPTAN 100 MG/1
TABLET, FILM COATED ORAL
Qty: 9 TABLET | Refills: 1 | Status: SHIPPED | OUTPATIENT
Start: 2022-05-02 | End: 2022-05-30 | Stop reason: SDUPTHER

## 2022-05-02 RX ORDER — FLUTICASONE PROPIONATE 50 MCG
2 SPRAY, SUSPENSION (ML) NASAL DAILY
Qty: 11.1 ML | Refills: 10 | Status: SHIPPED | OUTPATIENT
Start: 2022-05-02 | End: 2022-05-18 | Stop reason: SDUPTHER

## 2022-05-04 ENCOUNTER — TELEPHONE (OUTPATIENT)
Dept: INTERNAL MEDICINE | Facility: CLINIC | Age: 27
End: 2022-05-04

## 2022-05-04 NOTE — TELEPHONE ENCOUNTER
Pt was walking her dog when he got excited and pulled the leash resulting in her falling on her face and busting her lip. The pt does plan on going UC, but until she goes is there anything she can buy OTC for gabby lip, please advise.

## 2022-05-06 ENCOUNTER — E-VISIT (OUTPATIENT)
Dept: FAMILY MEDICINE CLINIC | Facility: TELEHEALTH | Age: 27
End: 2022-05-06

## 2022-05-06 PROCEDURE — BRIGHTMDVISIT: Performed by: NURSE PRACTITIONER

## 2022-05-06 NOTE — E-VISIT TREATED
Chief Complaint: Yeast infection   Patient introduction   Patient is 27-year-old female presenting with 1-3 days of vaginal burning, fishy-smelling vaginal discharge, and dysuria.   Patient-submitted comments   Patient writes: My vagina is burning both when I pee and just sitting here. It is very uncomfortable and painful..   General presentation   Reports burning during the entire duration of urination. Denies genital erythema, genital edema, and darkening of genital skin. Reports pain in or around vagina. Denies vaginal dryness. Symptom onset was after menses. Denies vaginal bleeding or spotting unrelated to menstruation.   Urinary symptoms: Reports urinary frequency, urinary urgency, cloudy urine, and strong-smelling urine.   Reports being sexually active in the past 90 days. Reports having a new sex partner in previous 2 weeks. Denies having unprotected sex with the new partner. Denies STI treatment within the past 3 months.   Reports taking oral contraceptive pill. Reports recent use of douching products. Denies recent use of a product containing nonoxynol-9. Denies new and recent use of possible irritants. Denies taking antibiotics in the last 2 weeks.   Positive history of vulvovaginal candidiasis with no episodes in the previous 12 months. Current symptoms are similar to previous episodes of vulvovaginal candidiasis.   Denies trying any treatments for current symptoms.   Reports positive history of bacterial vaginosis with no episodes in the previous 12 months. Reports current symptoms are similar to previous episodes of bacterial vaginosis.   Denies treating past episode(s) of bacterial vaginosis with an oral clindamycin, topical clindamycin, clindamycin vaginal ovule, oral metronidazole, topical metronidazole, or oral tinidazole.   Patient denies the following red flags:    Genital trauma    Genital sores    Abdominal or pelvic surgery within the last month    Fever    Vomiting    Abdominal pain     Retained foreign object in vagina    Unprotected vaginal sex with a new partner    Treatment for an STI in the last 3 months    Sexual partner tested positive for an STI   Pregnancy/menstrual status/breastfeeding:   Denies being pregnant. Denies breastfeeding. Regarding last menstrual period, patient writes: None.   Preferred medication route:   Prefers oral treatment option.   Denies previous antibiotic-induced yeast infection.   Current medications   Reports taking Metformin, Viibryd, Wellbutrin, Buspirone, Cetirizine, montelukast, Vitamin A / Vitamin D, and Iron.   Medication allergies   None.   Medication contraindication review   None.   Past medical history   No diabetes mellitus.   Immune conditions: Denies immunocompromising conditions. Denies history of cancer.   Social history   Non-smoker.   Assessment   Bacterial vaginosis.   This is the likely diagnosis based on patient's interview responses, including:    Fishy-smelling vaginal discharge.    New sexual partner in the past 14 days. Patient denies having had unprotected vaginal sex with the new partner.    Prior diagnosis of bacterial vaginosis with similar symptoms.    Absence of genital erythema, edema, and darkening of the skin.    Recent use of douching products.    Symptom onset was after menses.   Plan   Medications:    metronidazole 500 mg tablet RX 500mg 1 tab PO bid 7d for infection. Do not drink alcohol while taking this medication and for 24 hours after you finish the course of this medication. This medication is an antibiotic. Take it exactly as directed. You must finish the entire course of medication, even if you feel better after the first several days. Amount is 14 tab.   The patient's prescription will be sent to:   [facility] [phone] [id number]   Phone: [phone]     Fax: [phone]   Education:    Condition and causes    Prevention    Treatment and self-care    When to call  provider   Follow-up:   Patient to follow up as needed for progression or lack of improvement in symptoms within 7-10 days.   ----------   Electronically signed by PATRIC Dasilva on 2022-05-06 at 17:51PM   ----------   Patient Interview Transcript:   Which of these symptoms are bothering you? Scroll to see all options. Select all that apply.    Burning in my vagina    Fishy-smelling vaginal discharge    Burning with urination   Not selected:    Itching around my vagina    Itching in my vagina    Burning around my vagina    Vaginal discharge that looks different than usual    Pain during sex   Do you have any of these symptoms? Select all that apply.    Frequent, small amounts of urine    Sudden, strong urge to urinate    Cloudy urine    Strong-smelling urine   Not selected:    Blood in the urine    None of the above   How long have you had these symptoms? Select one.    1 to 3 days   Not selected:    Less than 24 hours    4 to 7 days    More than 7 days   You told us that it burns when you urinate. When exactly does it burn? Select one.    The entire time I'm urinating   Not selected:    Only when I start urinating    Only when I finish urinating    I'm not sure   Do you have any vaginal dryness? Select one.    No   Not selected:    Yes   Have you had any unexpected vaginal bleeding or spotting? By unexpected, we mean either between your normal periods or after you've gone through menopause. Select one.    No   Not selected:    Yes   Is there any redness, swelling, or darkening of the skin in or around your vagina? Select all that apply.    None of the above   Not selected:    Redness    Swelling    Darkening of the skin    I'm not sure   Do you have any pain in or around your vagina? Select one.    Yes   Not selected:    No   Since your symptoms started, have you used a vaginal health screening kit to check the acidity (pH) of your vaginal discharge? These kits are available without a prescription at many drug  stores and pharmacies. Common brands include Monistat Complete Care Vaginal Health Test and Vagisil Screening Kit. Select one.    No   Not selected:    Yes   Have you noticed any sores on your genital area? This includes blisters or ulcers. Select one.    No   Not selected:    Yes   Along with your vaginal symptoms, have you had any of these symptoms? Select all that apply.    None of the above   Not selected:    Fever    Vomiting    Abdominal (stomach) pain   Before your symptoms began, did you have an injury to your genital area or vagina? Select one.    No   Not selected:    Yes   Could an object like a tampon or condom be stuck inside your vagina? Select one.    No   Not selected:    Yes   In the 2 weeks before your symptoms began, did you use any douching products? Select one.    Yes   Not selected:    No   In the 2 weeks before your symptoms began, did you use any products containing nonoxynol-9? Nonoxynol-9 is a spermicide commonly found in birth control products, including condoms, sponges, vaginal films, and jellies. Select one.    No   Not selected:    Yes   In the week before your symptoms started, did any of these come into contact with your genital area? Scroll to see all options. Select all that apply.    None of the above   Not selected:    A new soap    Underwear washed with a new laundry detergent    A new sex toy    A new cream or lotion    A new medication    A new perfume    A new feminine or flushable wipe    Other (specify)   Have you had a yeast infection before? Select one.    Yes, and my current symptoms feel the same as before   Not selected:    Yes, but my current symptoms feel different than before    No   How many yeast infections have you had in the last 12 months? Select one.    0   Not selected:    1 to 3    4 or more    I'm not sure   Have you ever been treated for bacterial vaginosis (BV)? Select one.    Yes   Not selected:    No   Compared to the last time you were treated for BV,  how do your current symptoms feel? Select one.    The same   Not selected:    Different   In the last 12 months, how many times have you been treated for BV? Select one.    0   Not selected:    1 to 3    4 or more    I'm not sure   In the past, have you developed yeast infections as a result of taking oral antibiotics? Select one.    No   Not selected:    Yes   Were you sexually active at any time in the last 3 months? Select one.    Yes   Not selected:    No   Have you had sex with a new partner in the last 2 weeks? Select one.    Yes   Not selected:    No   Have you and your new partner had vaginal sex without a condom? Unprotected sex can put you at risk for sexually transmitted infections. Select one.    No   Not selected:    Yes   Have you had sex with 3 or more partners in the last 3 months? Select one.    No   Not selected:    Yes   In the last 3 months, were you treated for a sexually transmitted infection (STI)? Examples of STIs include chlamydia, gonorrhea, trichomoniasis (trich), herpes, or syphilis. Select one.    No   Not selected:    Yes   Has your sexual partner(s) recently tested positive for a sexually transmitted infection (STI)? Select all that apply.    No, not that I know of   Not selected:    Yes, chlamydia    Yes, gonorrhea    Yes, trichomoniasis (trich)    Other (specify)   Are you pregnant? Select one.    No   Not selected:    Yes   When was your last menstrual period? If you don't currently have periods or no longer have periods, please briefly explain.    My last period was on April 6th, 2022.   Are you breastfeeding? Select one.    No   Not selected:    Yes   Did your symptoms start before, during, or after your menstrual period? Select one.    After   Not selected:    Before    During    I don't currently have periods or no longer have periods    I'm not sure   Have you recently had abdominal or pelvic surgery? Select one.    No   Not selected:    Yes, within the last month    Yes,  "within the last 3 months   Are you currently being treated for Type 1 or Type 2 diabetes? Select one.    No   Not selected:    Yes   Do you have any of these conditions that can affect the immune system? Scroll to see all options. Select all that apply.    None of these   Not selected:    History of bone marrow transplant    Chronic kidney disease    Chronic liver disease (including cirrhosis)    HIV/AIDS    Inflammatory bowel disease (Crohn's disease or ulcerative colitis)    Lupus    Moderate to severe plaque psoriasis    Multiple sclerosis    Rheumatoid arthritis    Sickle cell anemia    Alpha or beta thalassemia    History of solid organ transplant (kidney, liver, or heart)    History of spleen removal    An autoimmune disorder not listed here    A condition requiring treatment with long-term use of oral steroids (such as prednisone, prednisolone, or dexamethasone)   Have you ever been diagnosed with cancer? Select one.    No   Not selected:    Yes, I have cancer now    Yes, but I'm in remission   Have you been treated for antibiotic-associated colitis in the last month? This is also called \"C. diff colitis.\" It's commonly caused by the bacteria Clostridium difficile. Select one.    No   Not selected:    Yes   Do you smoke tobacco? Select one.    No, never   Not selected:    Yes, every day    Yes, some days    No, I quit   Have you tried any of these over-the-counter treatments for your current symptoms? Select all that apply.    I haven't tried anything for my symptoms   Not selected:    Vaginal cream, ointment, or suppository for yeast infection    Anti-itch cream or ointment containing hydrocortisone    Vaginal wash    Vaginal wipes, such as Summer's Kellen    Homeopathic treatment    Other (specify)   Do you take or use any of these medications containing estrogen or progesterone? Select one.    Birth control pills   Not selected:    Hormonal intrauterine device (IUD)    Contraceptive patch    Vaginal ring, " such as NuvaRing    Birth control shot, such as Depo-Provera    Hormone replacement therapy (HRT), such as oral and topical medication, vaginal ring, and transdermal patch    None of the above   Which medication(s) have you used for BV? Select all that apply.    I'm not sure   Not selected:    Clindamycin oral capsules (Cleocin)    Clindamycin vaginal cream (Cleocin Vaginal, ClindaMax Vaginal, Clindesse)    Clindamycin vaginal ovule (Cleocin Vaginal)    Metronidazole oral tablets (Flagyl)    Metronidazole vaginal gel (MetroGel Vaginal, Nuvessa, Vandazole)    Tinidazole oral tablets (Tindamax)    Other (specify)   In the last 2 weeks, have you taken oral antibiotics? Oral antibiotics are medications that you take by mouth to treat a bacterial infection. Select one.    No   Not selected:    Yes   Are you taking any other medications or supplements? On the next screen, you need to list all vitamins, supplements, non-prescription medications (such as aspirin or Aleve), and prescription medications that you're taking. Select one.    Yes   Not selected:    Yes, but I'm not sure what they are    No   Have you ever had an allergic or bad reaction to any medication? Select one.    No   Not selected:    Yes   Have you used the medication disulfiram (Antabuse) in the last 2 weeks? Select one.    No   Not selected:    Yes   If medication is needed, would you prefer oral or vaginal medication? - Oral medications are pills that you swallow. - Vaginal medications are gels, creams, ointments, or suppositories that are placed in the vagina. We'll try to provide medication in the form you prefer, but that's not always possible. Select one.    Oral   Not selected:    Vaginal    No preference   Is there anything else you'd like to tell us about your symptoms?    My vagina is burning both when I pee and just sitting here. It is very uncomfortable and painful.   ----------   Medical history   The following information was received from  the EMR on May 06, 2022.   Allergies:    No Known Allergies   - Allergy Type:   - Reaction:   - Severity:   - Clinical Status: Active   - Verification Status: Confirmed   Medications:    FLUTICASONE PROPIONATE 50 MCG/ACT NA SUSP   - Route: Nasal   - Start Date: May 02, 2022   - End Date: None   - Status: Active    AJOVY 225 MG/1.5ML SC SOAJ   - Route: Subcutaneous   - Start Date: February 02, 2022   - End Date: None   - Status: Active    METFORMIN HCL  MG PO TB24   - Route: Oral   - Start Date: May 02, 2022   - End Date: None   - Status: Active    BUPROPION HCL ER (XL) 300 MG PO TB24   - Route: Oral   - Start Date: April 15, 2022   - End Date: None   - Status: Active    CETIRIZINE HCL 10 MG PO TABS   - Route: Oral   - Start Date: May 02, 2022   - End Date: None   - Status: Active    BUSPIRONE HCL 7.5 MG PO TABS   - Route: Oral   - Start Date: May 02, 2022   - End Date: None   - Status: Active    SUMATRIPTAN SUCCINATE 100 MG PO TABS   - Route:   - Start Date: May 02, 2022   - End Date: None   - Status: Active    MONTELUKAST SODIUM 10 MG PO TABS   - Route: Oral   - Start Date: April 25, 2022   - End Date: None   - Status: Active    VILAZODONE HCL 20 MG PO TABS   - Route: Oral   - Start Date: April 15, 2022   - End Date: None   - Status: Active

## 2022-05-06 NOTE — EXTERNAL PATIENT INSTRUCTIONS
Diagnosis   Bacterial vaginosis   My name is Natalya Flores, and I'm a healthcare provider at [facility]. Based on your interview, I see you have bacterial vaginosis (BV). BV is a very common vaginal infection. BV is NOT considered a sexually transmitted infection (STI).   Medications   Your pharmacy   [facility] [phone] [address] [phone]     Prescription   Metronidazole (500mg): Take 1 tablet by mouth twice a day for 7 days for infection. Do not drink alcohol while taking this medication and for 24 hours after you finish the course of this medication. This medication is an antibiotic. Take it exactly as directed. You must finish the entire course of medication, even if you feel better after the first several days.    Metronidazole is the treatment recommended by the Centers for Disease Control and Prevention (CDC) for BV.   About your diagnosis   Bacterial vaginosis (BV) is a vaginal infection that happens when there are changes in the number and type of bacteria that normally live in the vagina. Overgrowth of certain bacteria then leads to symptoms commonly associated with BV. We're not sure what causes this overgrowth of bacteria, but we do have effective treatments to cure the infection. Although BV isn't considered a sexually transmitted infection (STI), sexual activity is a risk factor for getting BV.   What to expect   If you follow this treatment plan, you should feel better in about 1 week. It's important that you finish all your medications, even if you feel better after the first several days.   When to seek care   Call us at 1 [phone]   with any sudden or unexpected symptoms.    Symptoms that change or get worse.    Symptoms that don't go away, even after completing your treatment plan.    A fever of 101F or higher.    Frothy or foamy vaginal discharge.    Green or yellow vaginal discharge.    Spotting or bleeding unrelated to your period.     Severe abdominal cramping or pain.    Sores on your genital area.    Nausea or vomiting.   Other treatment   Avoid sex or use condoms consistently and correctly while you're being treated for BV.   BV can increase your risk of getting sexually transmitted infections (STIs), such as HIV, gonorrhea, chlamydia, and herpes. Talk to your healthcare provider about screening for these and other STIs.   Prevention    Use unscented soaps.    Make sure you rinse off all soap or shower gel when bathing or showering.    If you take baths, don't add soap or any other bath products to the bath water.    After bathing, dry off completely before you get dressed.    Don't use scented sanitary pads or tampons.    Avoid using scented condoms or dental dams.    Urinate after sex.    Drink plenty of water.    Avoid douching products. Douching can increase the risk of vaginal infections.    Avoid products that contain nonoxynol-9.   Your provider   Your diagnosis was provided by Natalya Flores, a member of your trusted care team at [facility].   If you have any questions, call us at [phone]  .

## 2022-05-09 RX ORDER — CETIRIZINE HYDROCHLORIDE 10 MG/1
10 TABLET ORAL DAILY
Qty: 30 TABLET | Refills: 0 | Status: SHIPPED | OUTPATIENT
Start: 2022-05-09 | End: 2022-05-30 | Stop reason: SDUPTHER

## 2022-05-18 DIAGNOSIS — F41.9 ANXIETY: ICD-10-CM

## 2022-05-18 RX ORDER — FLUTICASONE PROPIONATE 50 MCG
2 SPRAY, SUSPENSION (ML) NASAL DAILY
Qty: 11.1 ML | Refills: 10 | Status: SHIPPED | OUTPATIENT
Start: 2022-05-18 | End: 2022-06-16 | Stop reason: SDUPTHER

## 2022-05-18 RX ORDER — FREMANEZUMAB-VFRM 225 MG/1.5ML
225 INJECTION SUBCUTANEOUS
Qty: 1.5 ML | Refills: 11 | Status: SHIPPED | OUTPATIENT
Start: 2022-05-18 | End: 2022-07-26

## 2022-05-18 RX ORDER — VILAZODONE HYDROCHLORIDE 20 MG/1
20 TABLET ORAL DAILY
Qty: 30 TABLET | Refills: 2 | Status: SHIPPED | OUTPATIENT
Start: 2022-05-18 | End: 2022-06-16 | Stop reason: SDUPTHER

## 2022-05-18 RX ORDER — BUPROPION HYDROCHLORIDE 300 MG/1
300 TABLET ORAL DAILY
Qty: 30 TABLET | Refills: 2 | Status: SHIPPED | OUTPATIENT
Start: 2022-05-18 | End: 2022-06-16 | Stop reason: SDUPTHER

## 2022-05-18 NOTE — TELEPHONE ENCOUNTER
Rx Refill Note  Requested Prescriptions     Pending Prescriptions Disp Refills   • Fremanezumab-vfrm (Ajovy) 225 MG/1.5ML solution auto-injector 1.5 mL 11     Sig: Inject 225 mg under the skin into the appropriate area as directed Every 30 (Thirty) Days.      Last office visit with prescribing clinician: 2/2/2022      Next office visit with prescribing clinician: Visit date not found            Norberto Ling MA  05/18/22, 14:31 EDT

## 2022-05-30 DIAGNOSIS — J30.2 SEASONAL ALLERGIES: ICD-10-CM

## 2022-05-30 DIAGNOSIS — R73.03 PRE-DIABETES: ICD-10-CM

## 2022-05-30 DIAGNOSIS — F41.9 ANXIETY: ICD-10-CM

## 2022-05-30 DIAGNOSIS — G43.919 INTRACTABLE MIGRAINE WITHOUT STATUS MIGRAINOSUS, UNSPECIFIED MIGRAINE TYPE: ICD-10-CM

## 2022-05-31 RX ORDER — CETIRIZINE HYDROCHLORIDE 10 MG/1
10 TABLET ORAL DAILY
Qty: 30 TABLET | Refills: 0 | Status: SHIPPED | OUTPATIENT
Start: 2022-05-31 | End: 2022-06-18 | Stop reason: SDUPTHER

## 2022-05-31 RX ORDER — BUSPIRONE HYDROCHLORIDE 7.5 MG/1
7.5 TABLET ORAL DAILY
Qty: 30 TABLET | Refills: 0 | Status: SHIPPED | OUTPATIENT
Start: 2022-05-31 | End: 2022-07-05 | Stop reason: SDUPTHER

## 2022-05-31 RX ORDER — SUMATRIPTAN 100 MG/1
TABLET, FILM COATED ORAL
Qty: 9 TABLET | Refills: 0 | Status: SHIPPED | OUTPATIENT
Start: 2022-05-31 | End: 2022-07-12 | Stop reason: SDUPTHER

## 2022-05-31 RX ORDER — METFORMIN HYDROCHLORIDE 750 MG/1
750 TABLET, EXTENDED RELEASE ORAL
Qty: 30 TABLET | Refills: 0 | Status: SHIPPED | OUTPATIENT
Start: 2022-05-31 | End: 2022-07-05 | Stop reason: SDUPTHER

## 2022-05-31 RX ORDER — MONTELUKAST SODIUM 10 MG/1
10 TABLET ORAL NIGHTLY
Qty: 30 TABLET | Refills: 0 | Status: SHIPPED | OUTPATIENT
Start: 2022-05-31 | End: 2022-07-07 | Stop reason: SDUPTHER

## 2022-06-16 DIAGNOSIS — F41.9 ANXIETY: ICD-10-CM

## 2022-06-16 RX ORDER — VILAZODONE HYDROCHLORIDE 20 MG/1
20 TABLET ORAL DAILY
Qty: 30 TABLET | Refills: 2 | Status: SHIPPED | OUTPATIENT
Start: 2022-06-16 | End: 2022-07-26

## 2022-06-16 RX ORDER — FLUTICASONE PROPIONATE 50 MCG
2 SPRAY, SUSPENSION (ML) NASAL DAILY
Qty: 11.1 ML | Refills: 10 | Status: SHIPPED | OUTPATIENT
Start: 2022-06-16 | End: 2022-08-25 | Stop reason: SDUPTHER

## 2022-06-16 RX ORDER — BUPROPION HYDROCHLORIDE 300 MG/1
300 TABLET ORAL DAILY
Qty: 30 TABLET | Refills: 2 | Status: SHIPPED | OUTPATIENT
Start: 2022-06-16 | End: 2022-07-26

## 2022-06-20 RX ORDER — FREMANEZUMAB-VFRM 225 MG/1.5ML
225 INJECTION SUBCUTANEOUS
Qty: 1.5 ML | Refills: 11 | OUTPATIENT
Start: 2022-06-20 | End: 2023-06-20

## 2022-06-20 RX ORDER — CETIRIZINE HYDROCHLORIDE 10 MG/1
10 TABLET ORAL DAILY
Qty: 30 TABLET | Refills: 0 | Status: SHIPPED | OUTPATIENT
Start: 2022-06-20 | End: 2022-08-25 | Stop reason: SDUPTHER

## 2022-06-20 NOTE — TELEPHONE ENCOUNTER
Rx Refill Note  Requested Prescriptions     Pending Prescriptions Disp Refills   • Fremanezumab-vfrm (Ajovy) 225 MG/1.5ML solution auto-injector 1.5 mL 11     Sig: Inject 225 mg under the skin into the appropriate area as directed Every 30 (Thirty) Days.      Last office visit with prescribing clinician: 2/2/2022      Next office visit with prescribing clinician: Visit date not found            Christal Pires MA  06/20/22, 08:02 EDT

## 2022-06-27 DIAGNOSIS — F41.9 ANXIETY: ICD-10-CM

## 2022-06-27 DIAGNOSIS — R73.03 PRE-DIABETES: ICD-10-CM

## 2022-06-27 RX ORDER — BUSPIRONE HYDROCHLORIDE 7.5 MG/1
7.5 TABLET ORAL DAILY
Qty: 30 TABLET | Refills: 0 | OUTPATIENT
Start: 2022-06-27

## 2022-06-27 RX ORDER — METFORMIN HYDROCHLORIDE 750 MG/1
750 TABLET, EXTENDED RELEASE ORAL
Qty: 30 TABLET | Refills: 0 | OUTPATIENT
Start: 2022-06-27

## 2022-06-28 ENCOUNTER — OFFICE VISIT (OUTPATIENT)
Dept: INTERNAL MEDICINE | Facility: CLINIC | Age: 27
End: 2022-06-28

## 2022-06-28 VITALS
SYSTOLIC BLOOD PRESSURE: 146 MMHG | TEMPERATURE: 96.9 F | HEIGHT: 63 IN | BODY MASS INDEX: 36.25 KG/M2 | OXYGEN SATURATION: 98 % | HEART RATE: 100 BPM | WEIGHT: 204.6 LBS | DIASTOLIC BLOOD PRESSURE: 104 MMHG | RESPIRATION RATE: 22 BRPM

## 2022-06-28 DIAGNOSIS — R00.0 TACHYCARDIA: Primary | ICD-10-CM

## 2022-06-28 DIAGNOSIS — R03.0 ELEVATED BLOOD PRESSURE READING: ICD-10-CM

## 2022-06-28 DIAGNOSIS — F41.9 ANXIETY: ICD-10-CM

## 2022-06-28 PROCEDURE — 99214 OFFICE O/P EST MOD 30 MIN: CPT | Performed by: NURSE PRACTITIONER

## 2022-06-28 RX ORDER — VILAZODONE HYDROCHLORIDE 10 MG/1
10 TABLET ORAL DAILY
Qty: 30 TABLET | Refills: 1 | Status: SHIPPED | OUTPATIENT
Start: 2022-06-28 | End: 2022-07-26

## 2022-07-04 PROCEDURE — 93000 ELECTROCARDIOGRAM COMPLETE: CPT | Performed by: NURSE PRACTITIONER

## 2022-07-05 ENCOUNTER — PATIENT MESSAGE (OUTPATIENT)
Dept: INTERNAL MEDICINE | Facility: CLINIC | Age: 27
End: 2022-07-05

## 2022-07-05 DIAGNOSIS — F41.9 ANXIETY: ICD-10-CM

## 2022-07-05 DIAGNOSIS — J30.2 SEASONAL ALLERGIES: ICD-10-CM

## 2022-07-05 DIAGNOSIS — R73.03 PRE-DIABETES: ICD-10-CM

## 2022-07-05 RX ORDER — MONTELUKAST SODIUM 10 MG/1
10 TABLET ORAL NIGHTLY
Qty: 30 TABLET | Refills: 0 | OUTPATIENT
Start: 2022-07-05

## 2022-07-05 RX ORDER — METFORMIN HYDROCHLORIDE 750 MG/1
750 TABLET, EXTENDED RELEASE ORAL
Qty: 30 TABLET | Refills: 0 | Status: SHIPPED | OUTPATIENT
Start: 2022-07-05 | End: 2022-08-08 | Stop reason: SDUPTHER

## 2022-07-05 RX ORDER — BUSPIRONE HYDROCHLORIDE 7.5 MG/1
7.5 TABLET ORAL DAILY
Qty: 30 TABLET | Refills: 0 | Status: SHIPPED | OUTPATIENT
Start: 2022-07-05 | End: 2022-08-08 | Stop reason: SDUPTHER

## 2022-07-05 NOTE — PROGRESS NOTES
New Patient Office Visit      Patient Name: Elisabeth Martinez  : 1995   MRN: [id number]     Chief Complaint:    Chief Complaint   Patient presents with   • Abnormal ECG     New patient. Had EKG done at [facility]  which was abnormal.    History of Present Illness: Elisabeth Martinez is a 27 y.o. female presents to clinic today to establish care.     Are you currently seeing any other doctors or specialists?   Neurology-Dr. Zaragoza    Patient has had a fast heart rate for one year. Patient's resting heart rate is between . She checks it on her apple watch.   Denies chest pain, light headedness, syncope, or pre-syncope. She has occasional dizziness. Caffeine intake is one iced coffee in the am. She does not use energy drinks. BP was elevated during a check at a local fire station and they did a EKG. It showed possible  left atrial enlargement and possible conduction delay.     Anxiety  She has taking viibryd 20 mg for 6 months. She has been on welbutrin  mg for 10 months. She also takes buspirone 7.5 mg daily. She has tried fluoxetine and it didn't help. She continues to struggle with anxiety.     Migraines  Ajovy every two weeks. Mother has MS and has Neurology orders MRIs yearly.    PCOS  She takes metformin 750 mg daily    Subjective     Review of System: Review of Systems   Constitutional: Negative for fatigue and fever.   HENT: Negative for congestion and rhinorrhea.    Respiratory: Negative for shortness of breath and wheezing.    Cardiovascular: Positive for palpitations. Negative for chest pain.   Gastrointestinal: Negative for abdominal pain, diarrhea, nausea and vomiting.   Musculoskeletal: Negative for myalgias.   Skin: Negative for rash.   Neurological: Negative for headaches.   Hematological: Negative for adenopathy.   Psychiatric/Behavioral: Negative for dysphoric mood, self-injury and suicidal ideas. The patient is nervous/anxious.       I have reviewed the ROS  documented by my clinical staff, updated appropriately and I agree. PATRIC Su    Past Medical History:   Past Medical History:   Diagnosis Date   • Anemia    • Anxiety    • Asthma    • Depression    • Hypothyroidism    • Kidney stone    • Migraine    • Ovarian cyst    • PCOS (polycystic ovarian syndrome)    • PCOS (polycystic ovarian syndrome)        Past Surgical History: History reviewed. No pertinent surgical history.    Family History:   Family History   Problem Relation Age of Onset   • Thyroid disease Mother    • Hypertension Mother    • Seizures Mother    • Arthritis Mother    • Heart disease Maternal Grandmother    • Stroke Maternal Grandmother    • Kidney disease Maternal Grandfather    • Cancer Maternal Grandfather    • Diabetes Maternal Grandfather        Social History:   Social History     Socioeconomic History   • Marital status: Single   Tobacco Use   • Smoking status: Never Smoker   • Smokeless tobacco: Never Used   Vaping Use   • Vaping Use: Never used   Substance and Sexual Activity   • Alcohol use: Yes     Comment: RARELY   • Drug use: Never   • Sexual activity: Yes     Partners: Male     Birth control/protection: Condom       Medications:     Current Outpatient Medications:   •  buPROPion XL (Wellbutrin XL) 300 MG 24 hr tablet, Take 1 tablet by mouth Daily., Disp: 30 tablet, Rfl: 2  •  busPIRone (BUSPAR) 7.5 MG tablet, Take 1 tablet by mouth Daily., Disp: 30 tablet, Rfl: 0  •  cetirizine (zyrTEC) 10 MG tablet, Take 1 tablet by mouth Daily., Disp: 30 tablet, Rfl: 0  •  fluticasone (FLONASE) 50 MCG/ACT nasal spray, 2 sprays into the nostril(s) as directed by provider Daily., Disp: 11.1 mL, Rfl: 10  •  Fremanezumab-vfrm (Ajovy) 225 MG/1.5ML solution auto-injector, Inject 225 mg under the skin into the appropriate area as directed Every 30 (Thirty) Days., Disp: 1.5 mL, Rfl: 11  •  metFORMIN ER (GLUCOPHAGE-XR) 750 MG 24 hr tablet, Take 1 tablet by mouth Daily With Breakfast., Disp: 30  "tablet, Rfl: 0  •  montelukast (SINGULAIR) 10 MG tablet, Take 1 tablet by mouth Every Night. NEEDS APPOINTMENT FOR NEXT REFILL, Disp: 30 tablet, Rfl: 0  •  SUMAtriptan (IMITREX) 100 MG tablet, Take 1 TAB @the onset of Migraine as needed., Disp: 9 tablet, Rfl: 0  •  vilazodone (VIIBRYD) 20 MG tablet tablet, Take 1 tablet by mouth Daily., Disp: 30 tablet, Rfl: 2  •  vilazodone (Viibryd) 10 MG tablet tablet, Take 1 tablet by mouth Daily., Disp: 30 tablet, Rfl: 1    Allergies:   No Known Allergies    Objective     Physical Exam:   Vital Signs:   Vitals:    06/28/22 1525   BP: (!) 146/104   BP Location: Right arm   Patient Position: Sitting   Cuff Size: Adult   Pulse: 100   Resp: 22   Temp: 96.9 °F (36.1 °C)   TempSrc: Infrared   SpO2: 98%   Weight: 92.8 kg (204 lb 9.6 oz)   Height: 158.8 cm (62.5\")   PainSc: 0-No pain     Body mass index is 36.83 kg/m².   Class 2 Severe Obesity (BMI >=35 and <=39.9). Obesity-related health conditions include the following: elevated blood pressure. Obesity is newly identified. BMI is is above average; BMI management plan is completed. We discussed portion control and increasing exercise.      Physical Exam  Constitutional:       General: She is not in acute distress.     Appearance: She is not ill-appearing.   HENT:      Head: Normocephalic.   Cardiovascular:      Rate and Rhythm: Normal rate and regular rhythm.      Heart sounds: Normal heart sounds. No murmur heard.  Pulmonary:      Breath sounds: Normal breath sounds.   Abdominal:      General: Bowel sounds are normal.      Tenderness: There is no abdominal tenderness.   Musculoskeletal:         General: No swelling or tenderness. Normal range of motion.   Lymphadenopathy:      Cervical: No cervical adenopathy.   Skin:     General: Skin is warm and dry.   Neurological:      General: No focal deficit present.      Mental Status: She is oriented to person, place, and time.   Psychiatric:         Mood and Affect: Mood normal.          No " longer has prwp        ECG 12 Lead    Date/Time: 7/4/2022 8:22 PM  Performed by: Zully Kirkland APRN  Authorized by: Zully Kirkland APRN   Comparison: compared with previous ECG from 10/31/2021  Rhythm: sinus rhythm  Rate: normal  Conduction: conduction normal  QRS axis: normal           No longer has prwp       Assessment / Plan      Assessment/Plan:   Diagnoses and all orders for this visit:    1. Tachycardia (Primary)  -     Holter Monitor - 72 Hour Up To 15 Days; Future    2. Anxiety  -     vilazodone (Viibryd) 10 MG tablet tablet; Take 1 tablet by mouth Daily.  Dispense: 30 tablet; Refill: 1  Add viibryd 10 mg to viibryd 20 mg  DC welbutrin    3. Elevated blood pressure reading  Your blood pressure was elevated today. Monitor at home and record. Bring to next appointment. If BP is >140/90 then f/u sooner.       I explained and discussed patient's condition and plan of care.  Discussed when to follow-up.  Discussed possible red flags and how to follow-up with those.  Viewed patient's medications and discussed common side effects. Patient to continue current medications as advised.  Be compliant with medications. Patient to let me know if worsening, does not tolerate medication, or any future concerns about treatment. Patient verbalized understanding and agreement with plan of care.     Follow Up: 3  Return in about 4 weeks (around 7/26/2022).    PATRIC Su  [facility]

## 2022-07-07 DIAGNOSIS — J30.2 SEASONAL ALLERGIES: ICD-10-CM

## 2022-07-07 RX ORDER — MONTELUKAST SODIUM 10 MG/1
10 TABLET ORAL NIGHTLY
Qty: 30 TABLET | Refills: 0 | Status: SHIPPED | OUTPATIENT
Start: 2022-07-07 | End: 2022-08-08 | Stop reason: SDUPTHER

## 2022-07-12 DIAGNOSIS — G43.919 INTRACTABLE MIGRAINE WITHOUT STATUS MIGRAINOSUS, UNSPECIFIED MIGRAINE TYPE: ICD-10-CM

## 2022-07-12 RX ORDER — FREMANEZUMAB-VFRM 225 MG/1.5ML
225 INJECTION SUBCUTANEOUS
Qty: 1.5 ML | Refills: 11 | OUTPATIENT
Start: 2022-07-12 | End: 2023-07-12

## 2022-07-12 RX ORDER — SUMATRIPTAN 100 MG/1
TABLET, FILM COATED ORAL
Qty: 9 TABLET | Refills: 0 | Status: SHIPPED | OUTPATIENT
Start: 2022-07-12 | End: 2022-08-25 | Stop reason: SDUPTHER

## 2022-07-20 ENCOUNTER — OFFICE VISIT (OUTPATIENT)
Dept: NEUROLOGY | Facility: CLINIC | Age: 27
End: 2022-07-20

## 2022-07-20 VITALS — OXYGEN SATURATION: 97 % | HEART RATE: 121 BPM | HEIGHT: 63 IN | WEIGHT: 207 LBS | BODY MASS INDEX: 36.68 KG/M2

## 2022-07-20 DIAGNOSIS — G43.019 INTRACTABLE MIGRAINE WITHOUT AURA AND WITHOUT STATUS MIGRAINOSUS: Primary | ICD-10-CM

## 2022-07-20 PROCEDURE — 99214 OFFICE O/P EST MOD 30 MIN: CPT | Performed by: PSYCHIATRY & NEUROLOGY

## 2022-07-20 RX ORDER — TOPIRAMATE 25 MG/1
25 TABLET ORAL 2 TIMES DAILY
Qty: 28 TABLET | Refills: 0 | Status: SHIPPED | OUTPATIENT
Start: 2022-07-20 | End: 2022-08-28 | Stop reason: DRUGHIGH

## 2022-07-20 RX ORDER — TOPIRAMATE 50 MG/1
50 TABLET, FILM COATED ORAL 2 TIMES DAILY
Qty: 60 TABLET | Refills: 2 | Status: SHIPPED | OUTPATIENT
Start: 2022-07-20 | End: 2022-09-11 | Stop reason: SDUPTHER

## 2022-07-20 NOTE — PROGRESS NOTES
Subjective:    CC: Elisabeth Martinez is in clinic today for follow up for intractable migraines.    HPI:  Follow-up: 7/10/2019: She is in clinic for regular follow-up.  Since her last visit, her insurance did approve Aimovig 140 mg subcutaneous injection.  She has done total of 2 injections since her last visit and reports that she is doing really well.  She has had only one headache in the last 2 months for recheck to take sumatriptan.  She is tolerating Aimovig well without any side effects.    Follow-up: 1/4/2021: She is in clinic for follow-up after July 2019.  She reports that she ran out of her Aimovig in August 2020 and her primary care physician's office could not refill it so since then she is not taking.  Since running out of Aimovig in August, she reports that the migraine intensity and frequency have increased and currently she is experiencing 8-9 breakthrough migraine in a month.  While on Aimovig, it would reduce to 1-2 migraines in a month.  She is currently taking sumatriptan 100 mg as needed as an abortive treatment and it is working well.  She wants to go back on Aimovig.    5/6/2021: She is in clinic for regular follow-up.  Since her last visit in January, she has now restarted Aimovig monthly injections and have done total 3 injections.  With Aimovig, she has again had excellent response and the migraine intensity and frequency has reduced to 1-2 migraines in a month.  She is using Imitrex 100 mg as needed as an abortive treatment and it works well.    2/2/2022: She is in clinic for regular follow-up.  Since her last visit in May 2021, she reports that after August 2021, insurance stopped covering Aimovig so she has not done any more injections since August.  She reports that the migraines have increased in frequency and intensity while with on average 10-12 migraine days in a month.  She is taking sumatriptan frequently and possibly has developed a rebound headaches as well.  She reports that  Imitrex does work well as an abortive treatment.    7/20/2022: She is in clinic for regular follow-up.  Since her last visit in February 2022, she reports that insurance approved Ajovy until May 2022 after which she is not getting it anymore.  Ajovy helped her significantly in keeping migraines under good control with on an average migraine frequency of 3-4 migraine days in a month.  However since May, it has increased to 10-14 migraine days in a month.  She has not been able to get Ajovy since then.    The following portions of the patient's history were reviewed and updated as of 07/20/2022: allergies, social history and problem list.       Current Outpatient Medications:   •  buPROPion XL (Wellbutrin XL) 300 MG 24 hr tablet, Take 1 tablet by mouth Daily., Disp: 30 tablet, Rfl: 2  •  busPIRone (BUSPAR) 7.5 MG tablet, Take 1 tablet by mouth Daily., Disp: 30 tablet, Rfl: 0  •  cetirizine (zyrTEC) 10 MG tablet, Take 1 tablet by mouth Daily., Disp: 30 tablet, Rfl: 0  •  fluticasone (FLONASE) 50 MCG/ACT nasal spray, 2 sprays into the nostril(s) as directed by provider Daily., Disp: 11.1 mL, Rfl: 10  •  Fremanezumab-vfrm (Ajovy) 225 MG/1.5ML solution auto-injector, Inject 225 mg under the skin into the appropriate area as directed Every 30 (Thirty) Days., Disp: 1.5 mL, Rfl: 11  •  metFORMIN ER (GLUCOPHAGE-XR) 750 MG 24 hr tablet, Take 1 tablet by mouth Daily With Breakfast., Disp: 30 tablet, Rfl: 0  •  montelukast (SINGULAIR) 10 MG tablet, Take 1 tablet by mouth Every Night. NEEDS APPOINTMENT FOR NEXT REFILL, Disp: 30 tablet, Rfl: 0  •  SUMAtriptan (IMITREX) 100 MG tablet, Take 1 TAB @the onset of Migraine as needed., Disp: 9 tablet, Rfl: 0  •  vilazodone (Viibryd) 10 MG tablet tablet, Take 1 tablet by mouth Daily., Disp: 30 tablet, Rfl: 1  •  vilazodone (VIIBRYD) 20 MG tablet tablet, Take 1 tablet by mouth Daily., Disp: 30 tablet, Rfl: 2  •  topiramate (Topamax) 25 MG tablet, Take 1 tablet by mouth 2 (Two) Times a Day  "for 14 days., Disp: 28 tablet, Rfl: 0  •  topiramate (Topamax) 50 MG tablet, Take 1 tablet by mouth 2 (Two) Times a Day., Disp: 60 tablet, Rfl: 2   Past Medical History:   Diagnosis Date   • Anemia    • Anxiety    • Asthma    • Depression    • Hypothyroidism    • Kidney stone    • Migraine    • Ovarian cyst    • PCOS (polycystic ovarian syndrome)    • PCOS (polycystic ovarian syndrome)       History reviewed. No pertinent surgical history.   Family History   Problem Relation Age of Onset   • Thyroid disease Mother    • Hypertension Mother    • Seizures Mother    • Arthritis Mother    • Heart disease Maternal Grandmother    • Stroke Maternal Grandmother    • Kidney disease Maternal Grandfather    • Cancer Maternal Grandfather    • Diabetes Maternal Grandfather         Review of Systems   Constitutional: Negative.    HENT: Negative.    Eyes: Negative.    Respiratory: Negative.    Cardiovascular: Negative.    Gastrointestinal: Negative.    Endocrine: Negative.    Genitourinary: Negative.    Musculoskeletal: Negative.    Skin: Negative.    Allergic/Immunologic: Negative.    Neurological: Positive for dizziness and headache.   Hematological: Negative.    Psychiatric/Behavioral: Negative.      Objective:    Pulse (!) 121   Ht 160 cm (63\")   Wt 93.9 kg (207 lb)   SpO2 97%   BMI 36.67 kg/m²     Neurology Exam:  General apperance: NAD.     Mental status: Alert, awake and oriented to time place and person.    Recent and Remote memory: Can recall 3/3 objects at 5 minutes. Can recall historical events.     Attention span and Concentration: Serial 7s: Normal.     Fund of knowledge:  Normal.     Language and Speech: No aphasia or dysarthria.    Naming , Repitition and Comprehension:  Can name objects, repeat a sentence and follow commands. Speech is clear and fluent with good repetition, comprehension, and naming.    CN II to XII: Intact.    Opthalmoscopic Exam: No papilledema.    Motor:  Right UE muscle strength 5/5. Normal " tone.     Left UE muscle strength 5/5. Normal tone.      Right LE muscle strength5/5. Normal tone.     Left LE muscle strength 5/5. Normal tone.      Sensory: Normal light touch, vibration and pinprick sensation bilaterally.    DTRs: 2+ bilaterally.    Babinski: Negative bilaterally.    Co-ordination: Normal finger-to-nose, heel to shin B/L.    Rhomberg: Negative.    Gait: Normal.    Cardiovascular: Regular rate and rhythm without murmur, gallop or rub.    Assessment and Plan:  1. Intractable migraine without aura and without status migrainosus  He had excellent response to both Aimovig and Ajovy but unfortunately insurance denied both of them.  We have [place].  Insurance company to get Ajovy approved and waiting for the decision.  Meanwhile, I am going to start her on Topamax 25 mg at bedtime slowly increasing to 50 mg daily dose for migraine prevention.  Common side effects including digital and perioral paresthesias, weight loss, rarely kidney stone was explained to the patient.  If Ajovy gets approved then it will be continued along with Topamax.  I will plan to see her back in clinic in 6 to 8 weeks for follow-up.       I spent 30 minutes in patient care: Reviewing records prior to the visit, entering orders and documentation and spent more than calvo 50% of this time face-to-face in management, instructions and education regarding above mentioned diagnosis and also on counseling and discussing about taking medication regularly, possible side effects with medication use, importance of good sleep hygiene, good hydration and regular exercise.    Return in about 8 weeks (around 9/14/2022).

## 2022-07-26 ENCOUNTER — LAB (OUTPATIENT)
Dept: LAB | Facility: HOSPITAL | Age: 27
End: 2022-07-26

## 2022-07-26 ENCOUNTER — OFFICE VISIT (OUTPATIENT)
Dept: INTERNAL MEDICINE | Facility: CLINIC | Age: 27
End: 2022-07-26

## 2022-07-26 VITALS
BODY MASS INDEX: 36.71 KG/M2 | WEIGHT: 207.2 LBS | OXYGEN SATURATION: 96 % | HEART RATE: 104 BPM | RESPIRATION RATE: 18 BRPM | TEMPERATURE: 97 F | DIASTOLIC BLOOD PRESSURE: 80 MMHG | SYSTOLIC BLOOD PRESSURE: 120 MMHG | HEIGHT: 63 IN

## 2022-07-26 DIAGNOSIS — R53.83 FATIGUE, UNSPECIFIED TYPE: ICD-10-CM

## 2022-07-26 DIAGNOSIS — F41.9 ANXIETY: ICD-10-CM

## 2022-07-26 DIAGNOSIS — N92.1 MENORRHAGIA WITH IRREGULAR CYCLE: Primary | ICD-10-CM

## 2022-07-26 DIAGNOSIS — R00.0 TACHYCARDIA: ICD-10-CM

## 2022-07-26 DIAGNOSIS — E55.9 VITAMIN D DEFICIENCY: ICD-10-CM

## 2022-07-26 PROCEDURE — 84403 ASSAY OF TOTAL TESTOSTERONE: CPT | Performed by: NURSE PRACTITIONER

## 2022-07-26 PROCEDURE — 84146 ASSAY OF PROLACTIN: CPT | Performed by: NURSE PRACTITIONER

## 2022-07-26 PROCEDURE — 82306 VITAMIN D 25 HYDROXY: CPT | Performed by: NURSE PRACTITIONER

## 2022-07-26 PROCEDURE — 83525 ASSAY OF INSULIN: CPT | Performed by: NURSE PRACTITIONER

## 2022-07-26 PROCEDURE — 80053 COMPREHEN METABOLIC PANEL: CPT | Performed by: NURSE PRACTITIONER

## 2022-07-26 PROCEDURE — 99214 OFFICE O/P EST MOD 30 MIN: CPT | Performed by: NURSE PRACTITIONER

## 2022-07-26 PROCEDURE — 84443 ASSAY THYROID STIM HORMONE: CPT | Performed by: NURSE PRACTITIONER

## 2022-07-26 PROCEDURE — 82746 ASSAY OF FOLIC ACID SERUM: CPT | Performed by: NURSE PRACTITIONER

## 2022-07-26 PROCEDURE — 82607 VITAMIN B-12: CPT | Performed by: NURSE PRACTITIONER

## 2022-07-26 PROCEDURE — 84439 ASSAY OF FREE THYROXINE: CPT | Performed by: NURSE PRACTITIONER

## 2022-07-26 PROCEDURE — 36415 COLL VENOUS BLD VENIPUNCTURE: CPT | Performed by: NURSE PRACTITIONER

## 2022-07-26 PROCEDURE — 84702 CHORIONIC GONADOTROPIN TEST: CPT | Performed by: NURSE PRACTITIONER

## 2022-07-26 RX ORDER — VILAZODONE HYDROCHLORIDE 40 MG/1
40 TABLET ORAL DAILY
Qty: 90 TABLET | Refills: 0 | Status: SHIPPED | OUTPATIENT
Start: 2022-07-26 | End: 2022-10-11 | Stop reason: SDUPTHER

## 2022-07-26 NOTE — PROGRESS NOTES
Patient Name: Elisabeth Martinez  : 1995   MRN: [id number]     Chief Complaint:    Chief Complaint   Patient presents with   • Rapid Heart Rate     1 month follow up       History of Present Illness: Elisabeth Martinez is a 27 y.o. female presents to clinic for follow-up:    Patient wore the holter monitor for two weeks. HR has been in the low 100s. She associates it with stress.  Denies chest pain, light headedness, syncope, or pre-syncope. She has occasional dizziness and numbness in arms and feet . She sees neurology/MS ruled out. Caffeine intake is one iced coffee in the am. She does not use energy drinks. BP was checked a few times 130-140/80.     She has had irregular periods for a long time.  Her cycles have been heavy for 1 year.  She will bleed for a day or two then stops for a day or 2 then bleeding reoccurs.  She has tried ocp and nexplanon.  They have not been  effective.  She has gained 60 pounds in the last two years. She has been on metformin for a few years for PCOS.     Anxiety  She was unable to increase viibryd to 30 mg because insurance wanted to charge her an additional $85 copay for the extra 10 mg. She stopped the welbutrin  mg. She also takes buspirone 7.5 mg daily. She has tried fluoxetine and it didn't help. She has worsening fatigue, weight gain, and hair loss. Her last TSH was mildly elevated.       Subjective     Review of System: Review of Systems   Constitutional: Positive for fatigue. Negative for fever.   HENT: Negative for congestion and rhinorrhea.    Respiratory: Negative for cough, shortness of breath and wheezing.    Cardiovascular: Positive for palpitations. Negative for chest pain.   Gastrointestinal: Negative for abdominal pain, diarrhea, nausea and vomiting.   Genitourinary: Positive for menstrual problem. Negative for dysuria.   Musculoskeletal: Negative for myalgias.   Skin: Negative for rash.   Neurological: Negative for headaches.   Hematological: Negative  "for adenopathy.   Psychiatric/Behavioral: Positive for dysphoric mood. The patient is nervous/anxious.         Medications:     Current Outpatient Medications:   •  busPIRone (BUSPAR) 7.5 MG tablet, Take 1 tablet by mouth Daily., Disp: 30 tablet, Rfl: 0  •  cetirizine (zyrTEC) 10 MG tablet, Take 1 tablet by mouth Daily., Disp: 30 tablet, Rfl: 0  •  fluticasone (FLONASE) 50 MCG/ACT nasal spray, 2 sprays into the nostril(s) as directed by provider Daily., Disp: 11.1 mL, Rfl: 10  •  metFORMIN ER (GLUCOPHAGE-XR) 750 MG 24 hr tablet, Take 1 tablet by mouth Daily With Breakfast., Disp: 30 tablet, Rfl: 0  •  montelukast (SINGULAIR) 10 MG tablet, Take 1 tablet by mouth Every Night. NEEDS APPOINTMENT FOR NEXT REFILL, Disp: 30 tablet, Rfl: 0  •  SUMAtriptan (IMITREX) 100 MG tablet, Take 1 TAB @the onset of Migraine as needed., Disp: 9 tablet, Rfl: 0  •  topiramate (Topamax) 25 MG tablet, Take 1 tablet by mouth 2 (Two) Times a Day for 14 days., Disp: 28 tablet, Rfl: 0  •  topiramate (Topamax) 50 MG tablet, Take 1 tablet by mouth 2 (Two) Times a Day., Disp: 60 tablet, Rfl: 2  •  vilazodone (Viibryd) 40 MG tablet tablet, Take 1 tablet by mouth Daily., Disp: 90 tablet, Rfl: 0    Allergies:   No Known Allergies    Objective     Physical Exam:   Vital Signs:   Vitals:    07/26/22 1418 07/26/22 1507   BP: 138/86 120/80   BP Location: Right arm    Patient Position: Sitting    Cuff Size: Adult    Pulse: 104    Resp: 18    Temp: 97 °F (36.1 °C)    TempSrc: Infrared    SpO2: 96%    Weight: 94 kg (207 lb 3.2 oz)    Height: 160 cm (63\")    PainSc: 0-No pain      Body mass index is 36.7 kg/m². Class 2 Severe Obesity (BMI >=35 and <=39.9). Obesity-related health conditions include the following: none. Obesity is newly identified. BMI is is above average; BMI management plan is completed. We discussed portion control, increasing exercise and an jame-based approach such as [employer] Solutionsness Pal or Lose It.      Physical Exam  Constitutional:       " General: She is not in acute distress.     Appearance: She is not ill-appearing.   HENT:      Head: Normocephalic.   Cardiovascular:      Rate and Rhythm: Normal rate and regular rhythm.      Heart sounds: Normal heart sounds. No murmur heard.  Pulmonary:      Breath sounds: Normal breath sounds. No wheezing or rales.   Abdominal:      General: Bowel sounds are normal.      Tenderness: There is no abdominal tenderness.   Musculoskeletal:         General: No tenderness.   Lymphadenopathy:      Cervical: No cervical adenopathy.   Neurological:      General: No focal deficit present.      Mental Status: She is oriented to person, place, and time.   Psychiatric:         Mood and Affect: Mood normal.         Assessment / Plan      Assessment/Plan:   Diagnoses and all orders for this visit:    1. Menorrhagia with irregular cycle (Primary)  -     Prolactin; Future  -     hCG, Quantitative, Pregnancy; Future  -     Testosterone; Future  -     Prolactin  -     hCG, Quantitative, Pregnancy  -     Testosterone    2. Fatigue, unspecified type  -     T4, free; Future  -     Comprehensive Metabolic Panel; Future  -     TSH; Future  -     Insulin, Total; Future  -     Vitamin B12; Future  -     Folate; Future  -     T4, free  -     Comprehensive Metabolic Panel  -     TSH  -     Insulin, Total  -     Vitamin B12  -     Folate    3. Vitamin D deficiency  -     Vitamin D 25 Hydroxy; Future  -     Vitamin D 25 Hydroxy    4. Anxiety  -     vilazodone (Viibryd) 40 MG tablet tablet; Take 1 tablet by mouth Daily.  Dispense: 90 tablet; Refill: 0    5. Tachycardia     I will request results of HOLTER.      Explained and discussed patient's condition and plan of care.  Discussed when to follow-up.  Discussed possible red flags and how to follow-up with those.  Viewed patient's medications and discussed common side effects. Patient to continue current medications as advised.  Be compliant with medications. Patient to let me know if they have  any worsening, no improvement, does not tolerate medication, or any future concerns about treatment. ER for emergencies.  Patient verbalized an understanding and agreement with plan of care.            Follow Up:   Return in about 4 weeks (around 8/23/2022) for Recheck.    PATRIC [facility]

## 2022-07-27 ENCOUNTER — TELEPHONE (OUTPATIENT)
Dept: INTERNAL MEDICINE | Facility: CLINIC | Age: 27
End: 2022-07-27

## 2022-07-27 DIAGNOSIS — R73.09 ELEVATED GLUCOSE: Primary | ICD-10-CM

## 2022-07-27 LAB
25(OH)D3 SERPL-MCNC: 32.2 NG/ML (ref 30–100)
ALBUMIN SERPL-MCNC: 4.3 G/DL (ref 3.5–5.2)
ALBUMIN/GLOB SERPL: 1.6 G/DL
ALP SERPL-CCNC: 116 U/L (ref 39–117)
ALT SERPL W P-5'-P-CCNC: 23 U/L (ref 1–33)
ANION GAP SERPL CALCULATED.3IONS-SCNC: 16 MMOL/L (ref 5–15)
AST SERPL-CCNC: 19 U/L (ref 1–32)
BILIRUB SERPL-MCNC: 0.2 MG/DL (ref 0–1.2)
BUN SERPL-MCNC: 10 MG/DL (ref 6–20)
BUN/CREAT SERPL: 15.2 (ref 7–25)
CALCIUM SPEC-SCNC: 9.8 MG/DL (ref 8.6–10.5)
CHLORIDE SERPL-SCNC: 101 MMOL/L (ref 98–107)
CO2 SERPL-SCNC: 23 MMOL/L (ref 22–29)
CREAT SERPL-MCNC: 0.66 MG/DL (ref 0.57–1)
EGFRCR SERPLBLD CKD-EPI 2021: 123.5 ML/MIN/1.73
FOLATE SERPL-MCNC: 11.1 NG/ML (ref 4.78–24.2)
GLOBULIN UR ELPH-MCNC: 2.7 GM/DL
GLUCOSE SERPL-MCNC: 135 MG/DL (ref 65–99)
HCG INTACT+B SERPL-ACNC: <1 MIU/ML
POTASSIUM SERPL-SCNC: 3.7 MMOL/L (ref 3.5–5.2)
PROLACTIN SERPL-MCNC: 20.2 NG/ML (ref 4.79–23.3)
PROT SERPL-MCNC: 7 G/DL (ref 6–8.5)
SODIUM SERPL-SCNC: 140 MMOL/L (ref 136–145)
T4 FREE SERPL-MCNC: 1.02 NG/DL (ref 0.93–1.7)
TESTOST SERPL-MCNC: 21.3 NG/DL (ref 8.4–48.1)
TSH SERPL DL<=0.05 MIU/L-ACNC: 2.87 UIU/ML (ref 0.27–4.2)
VIT B12 BLD-MCNC: 358 PG/ML (ref 211–946)

## 2022-07-27 NOTE — TELEPHONE ENCOUNTER
Caller: VIVIANE WITH [employer]     Relationship: Other    Best call back number: [phone]    What is the best time to reach you: ANYTIME     Who are you requesting to speak with (clinical staff, provider,  specific staff member): CLINICAL     Do you know the name of the person who called: VIVIANE WITH [employer]     What was the call regarding: VIVIANE REPORTS THAT A PRIOR AUTHORIZATION IS NOT REQUIRED FOR THE PATIENT ON HER vilazodone,  VIVIANE STATES THE MEDICATION CAN ONLY BE FILLED AS A 30 DAY SUPPLY, PER THE PATIENT'S RX PLAN, INSTEAD OF A 90 DAY SUPPLY. VIVIANE REPORTS SHE CAN SEE THAT [facility] SUCCESSFULLY RAN A 30 DAY SUPPLY THROUGH THE PATIENT'S INSURANCE BUT IS UNSURE IF THE PATIENT HAS BEEN NOTIFIED OR HAS PICKED UP THE PRESCRIPTION     Do you require a callback: PLEASE CALL IF ANY QUESTIONS

## 2022-07-28 LAB — INSULIN SERPL-ACNC: 205 UIU/ML (ref 2.6–24.9)

## 2022-07-29 ENCOUNTER — TELEPHONE (OUTPATIENT)
Dept: INTERNAL MEDICINE | Facility: CLINIC | Age: 27
End: 2022-07-29

## 2022-08-08 DIAGNOSIS — J30.2 SEASONAL ALLERGIES: ICD-10-CM

## 2022-08-08 DIAGNOSIS — R73.03 PRE-DIABETES: ICD-10-CM

## 2022-08-08 DIAGNOSIS — F41.9 ANXIETY: ICD-10-CM

## 2022-08-08 RX ORDER — MONTELUKAST SODIUM 10 MG/1
10 TABLET ORAL NIGHTLY
Qty: 30 TABLET | Refills: 0 | Status: SHIPPED | OUTPATIENT
Start: 2022-08-08 | End: 2022-08-25 | Stop reason: SDUPTHER

## 2022-08-08 RX ORDER — BUSPIRONE HYDROCHLORIDE 7.5 MG/1
7.5 TABLET ORAL DAILY
Qty: 30 TABLET | Refills: 0 | Status: SHIPPED | OUTPATIENT
Start: 2022-08-08 | End: 2022-08-25 | Stop reason: SDUPTHER

## 2022-08-08 RX ORDER — METFORMIN HYDROCHLORIDE 750 MG/1
750 TABLET, EXTENDED RELEASE ORAL
Qty: 30 TABLET | Refills: 0 | Status: SHIPPED | OUTPATIENT
Start: 2022-08-08 | End: 2022-09-11 | Stop reason: SDUPTHER

## 2022-08-25 ENCOUNTER — OFFICE VISIT (OUTPATIENT)
Dept: INTERNAL MEDICINE | Facility: CLINIC | Age: 27
End: 2022-08-25

## 2022-08-25 ENCOUNTER — LAB (OUTPATIENT)
Dept: LAB | Facility: HOSPITAL | Age: 27
End: 2022-08-25

## 2022-08-25 VITALS
SYSTOLIC BLOOD PRESSURE: 130 MMHG | BODY MASS INDEX: 36.67 KG/M2 | WEIGHT: 207 LBS | HEART RATE: 88 BPM | DIASTOLIC BLOOD PRESSURE: 92 MMHG | RESPIRATION RATE: 16 BRPM | TEMPERATURE: 97.8 F

## 2022-08-25 DIAGNOSIS — R73.09 ELEVATED GLUCOSE: ICD-10-CM

## 2022-08-25 DIAGNOSIS — G62.9 NEUROPATHY: Primary | ICD-10-CM

## 2022-08-25 DIAGNOSIS — G43.919 INTRACTABLE MIGRAINE WITHOUT STATUS MIGRAINOSUS, UNSPECIFIED MIGRAINE TYPE: ICD-10-CM

## 2022-08-25 DIAGNOSIS — J30.2 SEASONAL ALLERGIES: ICD-10-CM

## 2022-08-25 DIAGNOSIS — F41.9 ANXIETY: ICD-10-CM

## 2022-08-25 LAB
HBA1C MFR BLD: 5.7 % (ref 4.8–5.6)
HCYS SERPL-MCNC: 10 UMOL/L (ref 0–15)

## 2022-08-25 PROCEDURE — 83921 ORGANIC ACID SINGLE QUANT: CPT | Performed by: NURSE PRACTITIONER

## 2022-08-25 PROCEDURE — 83036 HEMOGLOBIN GLYCOSYLATED A1C: CPT | Performed by: NURSE PRACTITIONER

## 2022-08-25 PROCEDURE — 83090 ASSAY OF HOMOCYSTEINE: CPT | Performed by: NURSE PRACTITIONER

## 2022-08-25 PROCEDURE — 99214 OFFICE O/P EST MOD 30 MIN: CPT | Performed by: NURSE PRACTITIONER

## 2022-08-25 PROCEDURE — 83525 ASSAY OF INSULIN: CPT | Performed by: NURSE PRACTITIONER

## 2022-08-25 PROCEDURE — 36415 COLL VENOUS BLD VENIPUNCTURE: CPT | Performed by: NURSE PRACTITIONER

## 2022-08-25 RX ORDER — BUSPIRONE HYDROCHLORIDE 7.5 MG/1
7.5 TABLET ORAL DAILY
Qty: 90 TABLET | Refills: 1 | Status: SHIPPED | OUTPATIENT
Start: 2022-08-25 | End: 2022-11-05 | Stop reason: SDUPTHER

## 2022-08-25 RX ORDER — FLUTICASONE PROPIONATE 50 MCG
2 SPRAY, SUSPENSION (ML) NASAL DAILY
Qty: 33.3 ML | Refills: 1 | Status: SHIPPED | OUTPATIENT
Start: 2022-08-25 | End: 2022-11-13 | Stop reason: SDUPTHER

## 2022-08-25 RX ORDER — MONTELUKAST SODIUM 10 MG/1
10 TABLET ORAL NIGHTLY
Qty: 90 TABLET | Refills: 1 | Status: SHIPPED | OUTPATIENT
Start: 2022-08-25 | End: 2022-10-06 | Stop reason: SDUPTHER

## 2022-08-25 RX ORDER — CETIRIZINE HYDROCHLORIDE 10 MG/1
10 TABLET ORAL DAILY
Qty: 90 TABLET | Refills: 1 | Status: SHIPPED | OUTPATIENT
Start: 2022-08-25

## 2022-08-25 RX ORDER — MULTIPLE VITAMINS W/ MINERALS TAB 9MG-400MCG
1 TAB ORAL DAILY
COMMUNITY

## 2022-08-25 RX ORDER — SUMATRIPTAN 100 MG/1
TABLET, FILM COATED ORAL
Qty: 9 TABLET | Refills: 1 | Status: SHIPPED | OUTPATIENT
Start: 2022-08-25 | End: 2022-10-11 | Stop reason: SDUPTHER

## 2022-08-25 NOTE — PROGRESS NOTES
Patient Name: Elisabeth Martinez  : 1995   MRN: [id number]     Chief Complaint:    Chief Complaint   Patient presents with   • Follow-up     1 month follow up for fasting labs       History of Present Illness: Elisabeth Martinez is a 27 y.o. female presents to clinic for follow-up:    Holter monitor showed tachycardia.  HR has been in the low 100s. She associates it with stress. Denies chest pain, light headedness, syncope, or pre-syncope.    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 She has had numbness in fingers and toes since starting from Topamax. She sees neurology and ruled out MS.     She has had irregular periods for a long time. She has gained 60 pounds in the last two years. She has been on metformin 750 mg daily   for a few years for PCOS. She has an appointment with GYN [facility].     Anxiety/depression  Mood improved on  viibryd 40 mg. She has stopped Wellbutrin. She has been taking buspirone 7.5 mg daily.      Lab work  Her labs showed high insulin levels. All other  labs were within normal limits except glucose was slightly elevated (non-fasting).  Thyroid was within normal limits.        Migraine  She is on topiramate (per neurology). She uses Imitrex as needed.     Allergic rhinitis  Symptoms controlled with Flonase, montelukast and Zyrtec.    Subjective     Review of System: Review of Systems   Constitutional: Negative for fatigue and fever.   HENT: Negative for congestion and rhinorrhea.    Respiratory: Negative for shortness of breath and wheezing.    Cardiovascular: Negative for chest pain and palpitations.   Gastrointestinal: Negative for abdominal pain, diarrhea, nausea and vomiting.   Genitourinary: Positive for vaginal bleeding. Negative for dysuria.   Musculoskeletal: Negative for myalgias.   Skin: Negative for rash.   Neurological: Positive for numbness. Negative for headaches.   Hematological: Negative for adenopathy.   Psychiatric/Behavioral:  Negative for dysphoric mood. The patient is nervous/anxious.         Medications:     Current Outpatient Medications:   •  busPIRone (BUSPAR) 7.5 MG tablet, Take 1 tablet by mouth Daily., Disp: 90 tablet, Rfl: 1  •  cetirizine (zyrTEC) 10 MG tablet, Take 1 tablet by mouth Daily., Disp: 90 tablet, Rfl: 1  •  fluticasone (FLONASE) 50 MCG/ACT nasal spray, 2 sprays into the nostril(s) as directed by provider Daily., Disp: 33.3 mL, Rfl: 1  •  metFORMIN ER (GLUCOPHAGE-XR) 750 MG 24 hr tablet, Take 1 tablet by mouth Daily With Breakfast., Disp: 30 tablet, Rfl: 0  •  montelukast (SINGULAIR) 10 MG tablet, Take 1 tablet by mouth Every Night. NEEDS APPOINTMENT FOR NEXT REFILL, Disp: 90 tablet, Rfl: 1  •  multivitamin with minerals (MULTIVITAMIN ADULT PO), Take 1 tablet by mouth Daily., Disp: , Rfl:   •  SUMAtriptan (IMITREX) 100 MG tablet, Take 1 TAB @the onset of Migraine as needed., Disp: 9 tablet, Rfl: 1  •  topiramate (Topamax) 50 MG tablet, Take 1 tablet by mouth 2 (Two) Times a Day., Disp: 60 tablet, Rfl: 2  •  vilazodone (Viibryd) 40 MG tablet tablet, Take 1 tablet by mouth Daily., Disp: 90 tablet, Rfl: 0  •  topiramate (Topamax) 25 MG tablet, Take 1 tablet by mouth 2 (Two) Times a Day for 14 days., Disp: 28 tablet, Rfl: 0    Allergies:   No Known Allergies    Objective     Physical Exam:   Vital Signs:   Vitals:    08/25/22 0941   BP: 130/92   BP Location: Left arm   Patient Position: Sitting   Cuff Size: Adult   Pulse: 88   Resp: 16   Temp: 97.8 °F (36.6 °C)   TempSrc: Infrared   Weight: 93.9 kg (207 lb)     Body mass index is 36.67 kg/m². Class 2 Severe Obesity (BMI >=35 and <=39.9). Obesity-related health conditions include the following: Hyperglycemia. Obesity is unchanged. BMI is is above average; BMI management plan is completed. We discussed portion control and increasing exercise. We will check additional labs and consider medication therapy.       Physical Exam  Constitutional:       General: She is not in  acute distress.     Appearance: She is not ill-appearing.   HENT:      Head: Normocephalic.   Cardiovascular:      Rate and Rhythm: Normal rate and regular rhythm.      Heart sounds: Normal heart sounds. No murmur heard.  Pulmonary:      Breath sounds: Normal breath sounds.   Abdominal:      General: Bowel sounds are normal.      Tenderness: There is no abdominal tenderness.   Lymphadenopathy:      Cervical: No cervical adenopathy.   Skin:     General: Skin is warm and dry.   Neurological:      General: No focal deficit present.      Mental Status: She is oriented to person, place, and time.   Psychiatric:         Mood and Affect: Mood normal.         Assessment / Plan      Assessment/Plan:   Diagnoses and all orders for this visit:    1. Neuropathy (Primary)  -     Methylmalonic Acid, Serum; Future  -     Homocysteine; Future  -     Methylmalonic Acid, Serum  -     Homocysteine    2. Anxiety  -     busPIRone (BUSPAR) 7.5 MG tablet; Take 1 tablet by mouth Daily.  Dispense: 90 tablet; Refill: 1    3. Seasonal allergies  -     cetirizine (zyrTEC) 10 MG tablet; Take 1 tablet by mouth Daily.  Dispense: 90 tablet; Refill: 1  -     fluticasone (FLONASE) 50 MCG/ACT nasal spray; 2 sprays into the nostril(s) as directed by provider Daily.  Dispense: 33.3 mL; Refill: 1  -     montelukast (SINGULAIR) 10 MG tablet; Take 1 tablet by mouth Every Night. NEEDS APPOINTMENT FOR NEXT REFILL  Dispense: 90 tablet; Refill: 1    4. Intractable migraine without status migrainosus, unspecified migraine type  -     SUMAtriptan (IMITREX) 100 MG tablet; Take 1 TAB @the onset of Migraine as needed.  Dispense: 9 tablet; Refill: 1    5. Elevated glucose  -     Insulin, Total  -     Hemoglobin A1c       Explained and discussed patient's condition and plan of care.  Discussed when to follow-up.  Discussed possible red flags and how to follow-up with those.  Viewed patient's medications and discussed common side effects. Patient to continue current  medications as advised.  Be compliant with medications. Patient to let me know if they have any worsening, no improvement, does not tolerate medication, or any future concerns about treatment. ER for emergencies.  Patient verbalized an understanding and agreement with plan of care.            Follow Up:   Return in about 6 months (around 2/25/2023).    PATRIC Su  [facility]

## 2022-08-26 LAB — INSULIN SERPL-ACNC: 19.8 UIU/ML (ref 2.6–24.9)

## 2022-08-29 LAB — METHYLMALONATE SERPL-SCNC: 116 NMOL/L (ref 0–378)

## 2022-08-31 ENCOUNTER — OFFICE VISIT (OUTPATIENT)
Dept: OBSTETRICS AND GYNECOLOGY | Facility: CLINIC | Age: 27
End: 2022-08-31

## 2022-08-31 ENCOUNTER — LAB (OUTPATIENT)
Dept: LAB | Facility: HOSPITAL | Age: 27
End: 2022-08-31

## 2022-08-31 VITALS
BODY MASS INDEX: 36.75 KG/M2 | SYSTOLIC BLOOD PRESSURE: 130 MMHG | HEIGHT: 63 IN | DIASTOLIC BLOOD PRESSURE: 78 MMHG | WEIGHT: 207.4 LBS

## 2022-08-31 DIAGNOSIS — N93.9 ABNORMAL UTERINE BLEEDING (AUB): ICD-10-CM

## 2022-08-31 DIAGNOSIS — Z01.411 ENCOUNTER FOR GYNECOLOGICAL EXAMINATION WITH ABNORMAL FINDING: Primary | ICD-10-CM

## 2022-08-31 LAB
BASOPHILS # BLD AUTO: 0.08 10*3/MM3 (ref 0–0.2)
BASOPHILS NFR BLD AUTO: 0.7 % (ref 0–1.5)
DEPRECATED RDW RBC AUTO: 40.9 FL (ref 37–54)
EOSINOPHIL # BLD AUTO: 0.17 10*3/MM3 (ref 0–0.4)
EOSINOPHIL NFR BLD AUTO: 1.4 % (ref 0.3–6.2)
ERYTHROCYTE [DISTWIDTH] IN BLOOD BY AUTOMATED COUNT: 14.7 % (ref 12.3–15.4)
HCT VFR BLD AUTO: 39.7 % (ref 34–46.6)
HGB BLD-MCNC: 12.2 G/DL (ref 12–15.9)
IMM GRANULOCYTES # BLD AUTO: 0.05 10*3/MM3 (ref 0–0.05)
IMM GRANULOCYTES NFR BLD AUTO: 0.4 % (ref 0–0.5)
LYMPHOCYTES # BLD AUTO: 3.56 10*3/MM3 (ref 0.7–3.1)
LYMPHOCYTES NFR BLD AUTO: 29.5 % (ref 19.6–45.3)
MCH RBC QN AUTO: 24 PG (ref 26.6–33)
MCHC RBC AUTO-ENTMCNC: 30.7 G/DL (ref 31.5–35.7)
MCV RBC AUTO: 78.1 FL (ref 79–97)
MONOCYTES # BLD AUTO: 0.72 10*3/MM3 (ref 0.1–0.9)
MONOCYTES NFR BLD AUTO: 6 % (ref 5–12)
NEUTROPHILS NFR BLD AUTO: 62 % (ref 42.7–76)
NEUTROPHILS NFR BLD AUTO: 7.49 10*3/MM3 (ref 1.7–7)
NRBC BLD AUTO-RTO: 0 /100 WBC (ref 0–0.2)
PLATELET # BLD AUTO: 319 10*3/MM3 (ref 140–450)
PMV BLD AUTO: 12.2 FL (ref 6–12)
RBC # BLD AUTO: 5.08 10*6/MM3 (ref 3.77–5.28)
WBC NRBC COR # BLD: 12.07 10*3/MM3 (ref 3.4–10.8)

## 2022-08-31 PROCEDURE — 99395 PREV VISIT EST AGE 18-39: CPT | Performed by: NURSE PRACTITIONER

## 2022-08-31 PROCEDURE — 85025 COMPLETE CBC W/AUTO DIFF WBC: CPT

## 2022-08-31 NOTE — PROGRESS NOTES
"Chief Complaint  Elisbaeth Martinez is a 27 y.o.  female presenting for Gynecologic Exam (New GYN, [facility] care.  Annual exam.  Last pap 2021 (patient states that pap was normal).) and Polycystic Ovary Syndrome    History of Present Illness  Elisabeth is a very pleasant 28yo nulligravid woman, here today as a new gyn pt to our department.  She has known PCOS and irreg cycles.  (Oligomenorrhea, then sometimes heavy & prolonged bleeding.  States menses / bldg episodes will last \"1-21 days\".)  She has migraines WITH AURA.  And she had been on COCPs in the past; they have been discontinued recently.  We discussed LARCs and progestin-only methods.      The following portions of the patient's history were reviewed and updated as appropriate: allergies, current medications, past family history, past medical history, past social history, past surgical history and problem list.    No Known Allergies      Current Outpatient Medications:   •  busPIRone (BUSPAR) 7.5 MG tablet, Take 1 tablet by mouth Daily., Disp: 90 tablet, Rfl: 1  •  cetirizine (zyrTEC) 10 MG tablet, Take 1 tablet by mouth Daily., Disp: 90 tablet, Rfl: 1  •  fluticasone (FLONASE) 50 MCG/ACT nasal spray, 2 sprays into the nostril(s) as directed by provider Daily., Disp: 33.3 mL, Rfl: 1  •  metFORMIN ER (GLUCOPHAGE-XR) 750 MG 24 hr tablet, Take 1 tablet by mouth Daily With Breakfast., Disp: 30 tablet, Rfl: 0  •  montelukast (SINGULAIR) 10 MG tablet, Take 1 tablet by mouth Every Night. NEEDS APPOINTMENT FOR NEXT REFILL, Disp: 90 tablet, Rfl: 1  •  multivitamin with minerals (MULTIVITAMIN ADULT PO), Take 1 tablet by mouth Daily., Disp: , Rfl:   •  SUMAtriptan (IMITREX) 100 MG tablet, Take 1 TAB @the onset of Migraine as needed., Disp: 9 tablet, Rfl: 1  •  topiramate (Topamax) 50 MG tablet, Take 1 tablet by mouth 2 (Two) Times a Day., Disp: 60 tablet, Rfl: 2  •  vilazodone (Viibryd) 40 MG tablet tablet, Take 1 tablet by mouth Daily., Disp: 90 tablet, Rfl: " "0    Past Medical History:   Diagnosis Date   • Anemia    • Anxiety    • Asthma    • Depression    • Hypothyroidism    • Kidney stone    • Migraine    • Ovarian cyst    • PCOS (polycystic ovarian syndrome)    • PCOS (polycystic ovarian syndrome)         History reviewed. No pertinent surgical history.    Objective  /78   Ht 160 cm (63\")   Wt 94.1 kg (207 lb 6.4 oz)   LMP 08/25/2022 Comment: scheduled with gyn 8/31/2022  Breastfeeding No   BMI 36.74 kg/m²     Physical Exam  Exam conducted with a chaperone present.   Constitutional:       Appearance: Normal appearance.   HENT:      Head: Normocephalic.   Neck:      Thyroid: No thyroid mass or thyromegaly.   Cardiovascular:      Rate and Rhythm: Normal rate and regular rhythm.      Heart sounds: Normal heart sounds.   Pulmonary:      Effort: Pulmonary effort is normal.      Breath sounds: Normal breath sounds.   Chest:   Breasts:      Right: No inverted nipple, mass, nipple discharge, axillary adenopathy or supraclavicular adenopathy.      Left: No inverted nipple, mass, nipple discharge, axillary adenopathy or supraclavicular adenopathy.       Abdominal:      Palpations: Abdomen is soft. There is no mass.      Tenderness: There is no abdominal tenderness.   Genitourinary:     General: Normal vulva.      Labia:         Right: No lesion.         Left: No lesion.       Vagina: Normal. No erythema.      Cervix: No discharge, lesion or erythema.      Uterus: Not enlarged and not tender.       Adnexa:         Right: No mass or tenderness.          Left: No mass or tenderness.        Comments: Anus appears wnl.  No rectal exam performed.  Lymphadenopathy:      Upper Body:      Right upper body: No supraclavicular or axillary adenopathy.      Left upper body: No supraclavicular or axillary adenopathy.   Neurological:      Mental Status: She is alert.         Assessment/Plan   Diagnoses and all orders for this visit:    1. Encounter for gynecological examination " with abnormal finding (Primary)    2. Abnormal uterine bleeding (AUB)  -     US Non-ob Transvaginal; Future  -     CBC & Differential; Future    She is leaning toward IUD / written pamplets given.    Procedures    19 to 39: Counseling/Anticipatory Guidance Discussed: family planning/contraception and breast cancer and self breast exams    No follow-ups on file.    Antonella Dangelo, APRN  08/31/2022

## 2022-09-11 DIAGNOSIS — R73.03 PRE-DIABETES: ICD-10-CM

## 2022-09-12 RX ORDER — METFORMIN HYDROCHLORIDE 750 MG/1
750 TABLET, EXTENDED RELEASE ORAL
Qty: 30 TABLET | Refills: 0 | Status: SHIPPED | OUTPATIENT
Start: 2022-09-12 | End: 2022-10-11 | Stop reason: SDUPTHER

## 2022-09-12 RX ORDER — TOPIRAMATE 50 MG/1
50 TABLET, FILM COATED ORAL 2 TIMES DAILY
Qty: 60 TABLET | Refills: 2 | Status: SHIPPED | OUTPATIENT
Start: 2022-09-12 | End: 2022-10-11 | Stop reason: SDUPTHER

## 2022-09-14 ENCOUNTER — OFFICE VISIT (OUTPATIENT)
Dept: NEUROLOGY | Facility: CLINIC | Age: 27
End: 2022-09-14

## 2022-09-14 VITALS
WEIGHT: 207 LBS | SYSTOLIC BLOOD PRESSURE: 112 MMHG | BODY MASS INDEX: 36.68 KG/M2 | HEIGHT: 63 IN | DIASTOLIC BLOOD PRESSURE: 80 MMHG | HEART RATE: 110 BPM | OXYGEN SATURATION: 98 %

## 2022-09-14 DIAGNOSIS — G43.019 INTRACTABLE MIGRAINE WITHOUT AURA AND WITHOUT STATUS MIGRAINOSUS: Primary | ICD-10-CM

## 2022-09-14 PROCEDURE — 99214 OFFICE O/P EST MOD 30 MIN: CPT | Performed by: PSYCHIATRY & NEUROLOGY

## 2022-09-14 NOTE — PROGRESS NOTES
Subjective:    CC: Elisabeth Martinez is in clinic today for follow up for migraines.    HPI:  Follow-up: 7/10/2019: She is in clinic for regular follow-up.  Since her last visit, her insurance did approve Aimovig 140 mg subcutaneous injection.  She has done total of 2 injections since her last visit and reports that she is doing really well.  She has had only one headache in the last 2 months for recheck to take sumatriptan.  She is tolerating Aimovig well without any side effects.    Follow-up: 1/4/2021: She is in clinic for follow-up after July 2019.  She reports that she ran out of her Aimovig in August 2020 and her primary care physician's office could not refill it so since then she is not taking.  Since running out of Aimovig in August, she reports that the migraine intensity and frequency have increased and currently she is experiencing 8-9 breakthrough migraine in a month.  While on Aimovig, it would reduce to 1-2 migraines in a month.  She is currently taking sumatriptan 100 mg as needed as an abortive treatment and it is working well.  She wants to go back on Aimovig.    5/6/2021: She is in clinic for regular follow-up.  Since her last visit in January, she has now restarted Aimovig monthly injections and have done total 3 injections.  With Aimovig, she has again had excellent response and the migraine intensity and frequency has reduced to 1-2 migraines in a month.  She is using Imitrex 100 mg as needed as an abortive treatment and it works well.    2/2/2022: She is in clinic for regular follow-up.  Since her last visit in May 2021, she reports that after August 2021, insurance stopped covering Aimovig so she has not done any more injections since August.  She reports that the migraines have increased in frequency and intensity while with on average 10-12 migraine days in a month.  She is taking sumatriptan frequently and possibly has developed a rebound headaches as well.  She reports that Imitrex does  work well as an abortive treatment.    7/20/2022: She is in clinic for regular follow-up.  Since her last visit in February 2022, she reports that insurance approved Ajovy until May 2022 after which she is not getting it anymore.  Ajovy helped her significantly in keeping migraines under good control with on an average migraine frequency of 3-4 migraine days in a month.  However since May, it has increased to 10-14 migraine days in a month.  She has not been able to get Ajovy since then.    9/14/2022: She is in clinic for regular follow-up.  Since her last visit in July 2022, she has been taking Topamax 50 mg twice daily as insurance denied Ajovy and she reports that she has had excellent response.  She is reporting 1-2 breakthrough migraines in a month with Topamax use.  She has experienced some numbness and tingling involving both her hands and feet but is tolerable.  She is currently using Imitrex as needed as an abortive treatment but many times after the migraine gets better after taking Imitrex, she will get pain in the neck and shoulder area.       The following portions of the patient's history were reviewed and updated as of 09/14/2022: allergies, social history and problem list.       Current Outpatient Medications:   •  busPIRone (BUSPAR) 7.5 MG tablet, Take 1 tablet by mouth Daily., Disp: 90 tablet, Rfl: 1  •  cetirizine (zyrTEC) 10 MG tablet, Take 1 tablet by mouth Daily., Disp: 90 tablet, Rfl: 1  •  fluticasone (FLONASE) 50 MCG/ACT nasal spray, 2 sprays into the nostril(s) as directed by provider Daily., Disp: 33.3 mL, Rfl: 1  •  metFORMIN ER (GLUCOPHAGE-XR) 750 MG 24 hr tablet, Take 1 tablet by mouth Daily With Breakfast., Disp: 30 tablet, Rfl: 0  •  montelukast (SINGULAIR) 10 MG tablet, Take 1 tablet by mouth Every Night. NEEDS APPOINTMENT FOR NEXT REFILL, Disp: 90 tablet, Rfl: 1  •  multivitamin with minerals tablet tablet, Take 1 tablet by mouth Daily., Disp: , Rfl:   •  SUMAtriptan (IMITREX) 100  "MG tablet, Take 1 TAB @the onset of Migraine as needed., Disp: 9 tablet, Rfl: 1  •  topiramate (Topamax) 50 MG tablet, Take 1 tablet by mouth 2 (Two) Times a Day., Disp: 60 tablet, Rfl: 2  •  vilazodone (Viibryd) 40 MG tablet tablet, Take 1 tablet by mouth Daily., Disp: 90 tablet, Rfl: 0   Past Medical History:   Diagnosis Date   • Anemia    • Anxiety    • Asthma    • Depression    • Hypothyroidism    • Kidney stone    • Migraine    • Ovarian cyst    • PCOS (polycystic ovarian syndrome)    • PCOS (polycystic ovarian syndrome)       History reviewed. No pertinent surgical history.   Family History   Problem Relation Age of Onset   • Thyroid disease Mother    • Hypertension Mother    • Seizures Mother    • Arthritis Mother    • Heart disease Maternal Grandmother    • Stroke Maternal Grandmother    • Kidney disease Maternal Grandfather    • Cancer Maternal Grandfather    • Diabetes Maternal Grandfather         Review of Systems   Neurological: Positive for weakness and numbness.     Objective:    /80   Pulse 110   Ht 160 cm (62.99\")   Wt 93.9 kg (207 lb)   LMP 08/25/2022 Comment: scheduled with gyn 8/31/2022  SpO2 98%   BMI 36.68 kg/m²     Neurology Exam:  General apperance: NAD.     Mental status: Alert, awake and oriented to time place and person.    Recent and Remote memory: Can recall 3/3 objects at 5 minutes. Can recall historical events.     Attention span and Concentration: Serial 7s: Normal.     Fund of knowledge:  Normal.     Language and Speech: No aphasia or dysarthria.    Naming , Repitition and Comprehension:  Can name objects, repeat a sentence and follow commands. Speech is clear and fluent with good repetition, comprehension, and naming.    CN II to XII: Intact.    Opthalmoscopic Exam: No papilledema.    Motor:  Right UE muscle strength 5/5. Normal tone.     Left UE muscle strength 5/5. Normal tone.      Right LE muscle strength5/5. Normal tone.     Left LE muscle strength 5/5. Normal tone.  "     Sensory: Normal light touch, vibration and pinprick sensation bilaterally.    DTRs: 2+ bilaterally.    Babinski: Negative bilaterally.    Co-ordination: Normal finger-to-nose, heel to shin B/L.    Rhomberg: Negative.    Gait: Normal.    Cardiovascular: Regular rate and rhythm without murmur, gallop or rub.    Assessment and Plan:  1. Intractable migraine without aura and without status migrainosus  -With Topamax 50 mg twice daily, she has had excellent response and migraine frequency has reduced to 1-3 migraines in a month.  Insurance did not approve Ajovy.  Since she is reporting some side effects with Imitrex, I have given sample of Nurtec for her to try.  If it works and prescription will be sent to her pharmacy.  Otherwise, I will see her back in 3 months for follow-up.       I spent 30 minutes in patient care: Reviewing records prior to the visit, entering orders and documentation and spent more than calvo 50% of this time face-to-face in management, instructions and education regarding above mentioned diagnosis and also on counseling and discussing about taking medication regularly, possible side effects with medication use, importance of good sleep hygiene, good hydration and regular exercise.    Return in about 3 months (around 12/14/2022).

## 2022-09-26 RX ORDER — MISOPROSTOL 100 UG/1
100 TABLET ORAL 4 TIMES DAILY
Qty: 2 TABLET | Refills: 0 | Status: SHIPPED | OUTPATIENT
Start: 2022-09-26 | End: 2022-09-27

## 2022-09-27 ENCOUNTER — OFFICE VISIT (OUTPATIENT)
Dept: OBSTETRICS AND GYNECOLOGY | Facility: CLINIC | Age: 27
End: 2022-09-27

## 2022-09-27 VITALS
DIASTOLIC BLOOD PRESSURE: 80 MMHG | WEIGHT: 208 LBS | RESPIRATION RATE: 16 BRPM | SYSTOLIC BLOOD PRESSURE: 118 MMHG | BODY MASS INDEX: 36.85 KG/M2

## 2022-09-27 DIAGNOSIS — Z30.430 ENCOUNTER FOR INSERTION OF PROGESTIN-RELEASING INTRAUTERINE CONTRACEPTIVE DEVICE (IUD): Primary | ICD-10-CM

## 2022-09-27 DIAGNOSIS — Z30.09 ENCOUNTER FOR OTHER GENERAL COUNSELING OR ADVICE ON CONTRACEPTION: ICD-10-CM

## 2022-09-27 LAB
B-HCG UR QL: NEGATIVE
EXPIRATION DATE: NORMAL
INTERNAL NEGATIVE CONTROL: NEGATIVE
INTERNAL POSITIVE CONTROL: POSITIVE
Lab: NORMAL

## 2022-09-27 PROCEDURE — 81025 URINE PREGNANCY TEST: CPT | Performed by: NURSE PRACTITIONER

## 2022-09-27 PROCEDURE — 58300 INSERT INTRAUTERINE DEVICE: CPT | Performed by: NURSE PRACTITIONER

## 2022-09-27 NOTE — PROGRESS NOTES
Problem Visit     Patient Name: Elisabeth Martinez  : 1995   MRN: [id number]   Care Team: Patient Care Team:  Zully Kirkland APRN as PCP - General (Nurse Practitioner)  Antonella Dangelo APRN as Nurse Practitioner (Obstetrics and Gynecology)    Chief Complaint:    Chief Complaint   Patient presents with   • Contraception     Kyleena iud insertion       HPI: Elisabeth Martinez is a 27 y.o. year old  presenting to be seen for Kyleena insertion.   LMP 22   Has been sexually active in the last 2 wks but faithful with condom use   Urine PT negative today     Hx PCOS and irregular periods     Hx migraines with aura     Denies questions/concerns re: method or procedure today   AV done 2022 with Odessa Dangelo       Subjective      /80   Resp 16   Wt 94.3 kg (208 lb)   LMP 2022   Breastfeeding No   BMI 36.85 kg/m²     BMI reviewed: Body mass index is 36.85 kg/m².      Objective     Physical Exam      Neuro: alert and oriented to person, place and time   General:  alert; cooperative; well developed; well nourished   Skin:  Not performed.   Thyroid: not examined   Lungs:  breathing is unlabored   Heart:  Not performed.   Breasts:  Not performed.   Abdomen: Not performed.   Pelvis: Clinical staff was present for exam  External genitalia:  normal appearance of the external genitalia including Bartholin's and [place]'s glands.  :  urethral meatus normal;  Vaginal:  normal pink mucosa without prolapse or lesions. blood present -  small amount;  Cervix:  normal appearance. blood is seen coming from external cervical os;  Uterus:  normal size, shape and consistency.       IUD Insertion     After having reviewed the contraindications, side effects, and risks and benefits of all major options for reversible contraception, the patient chose the Kyleena  IUD for contraception. The risks of insertion, pelvic infection, and the possibility of failure was discussed. Patient stated that she has a  good understanding of all we discussed, and all questions were answered regarding IUD use. Consent form was signed.     With the patient in the dorsal lithotomy position, a speculum was placed in the vagina. The cervix and vagina were prepped with Betadine, and the uterus was sounded to a depth of 8 cm. The Kyleena  IUD was placed in the endometrial cavity as directed without complications. The strings were trimmed to approximately 3 cm. Patient tolerated the procedure well.     Assessment / Plan      Assessment  Problems Addressed This Visit    ICD-10-CM ICD-9-CM   1. Encounter for insertion of progestin-releasing intrauterine contraceptive device (IUD)  Z30.430 V25.11   2. Encounter for other general counseling or advice on contraception  Z30.09 V25.09       Plan    Reviewed expected bldg pattern with Kyleena and potential for irregular bldg   Reviewed warning signs to call for   No tampons or intercourse x 3-4 days   Then backup method like condoms for 1 wk   F/u in office in 6 wks with u/s first to confirm IUD placement then string check   Call with questions/concerns           Follow Up  Return in about 6 weeks (around 11/8/2022) for Recheck - with u/s first to check IUD .  Patient was given instructions and counseling regarding her condition or for health maintenance advice. Please see specific information pulled into the AVS if appropriate.     PATRIC Sommers  September 27, 2022  15:07 EDT

## 2022-10-05 ENCOUNTER — TELEPHONE (OUTPATIENT)
Dept: OBSTETRICS AND GYNECOLOGY | Facility: CLINIC | Age: 27
End: 2022-10-05

## 2022-10-05 ENCOUNTER — PATIENT MESSAGE (OUTPATIENT)
Dept: OBSTETRICS AND GYNECOLOGY | Facility: CLINIC | Age: 27
End: 2022-10-05

## 2022-10-05 DIAGNOSIS — Z30.431 IUD CHECK UP: Primary | ICD-10-CM

## 2022-10-05 DIAGNOSIS — R10.2 PELVIC PAIN: ICD-10-CM

## 2022-10-05 NOTE — TELEPHONE ENCOUNTER
S/w pt to discuss her sx - states after IUD placement she had pelvic pain x 2-3 days but it resolved.  Pelvic pain began about 3 days ago, worse yesterday and today, and nothing is helpful including ibuprofen or tylenol.  States this is different pain than typical period cramps.  No vaginal c/o.  Did start having period like flow 3 days ago that has continued.  No additional sx.  She is agreeable to u/s tomorrow then visit with me afterwards to discuss and for further evaluation.  If severe pain occurs before then, she will report to ER.  Pt v/u to all instruction/counseling.

## 2022-10-05 NOTE — TELEPHONE ENCOUNTER
Called and spoke with patient, a message was received via [facility] earlier today and was sent to provider for review - I let her know that this was the current status and that we would give her a call back once we heard from Odessa Dangelo on what her advisement would be for the patient. Patient verified understanding.

## 2022-10-05 NOTE — TELEPHONE ENCOUNTER
----- Message from Natalya Diaz MA sent at 10/5/2022  1:25 PM EDT -----  Regarding: FW: IUD    ----- Message -----  From: Elisabeth Martinez  Sent: 10/5/2022   1:24 PM EDT  To: e [facility]yn Clinical Pool  Subject: IUD                                              I also feel as if I should mention that it hurts beyond normal period cramps. I’ve tried to take ibuprofen, pamprin, etc. but nothing is touching the pain. My uterus and lower back is hurting. Is this normal/how long does this last?     Thanks!      normal (ped)...

## 2022-10-05 NOTE — TELEPHONE ENCOUNTER
Caller: Elisabeth Martinez    Relationship to patient: Self    Best call back number: [phone]    Patient is needing: UNABLE TO WARM TRANSFER, PT STATES SHE HAD HER IUD PLACED ON Tuesday 09/27, SHE MENTIONS LOT OF PRESSURE AND SEVERE CRAMPING, MENTIONS LOWER BACK AND LOWER ABDMONIAL PAIN, SHE HAS TRIED OTC PAIN MEDICATIONS/ HEATING PAD AND THERE'S NO CHANGE, HAS GOTTEN WORSE IN THE LAST COUPLE DAYS.    WOULD LIKE A CALLBACK, ASAP, ANYTIME, OK TO LEAVE A .

## 2022-10-05 NOTE — TELEPHONE ENCOUNTER
From: Elisabeth Martinez  To: PATRIC Trammell  Sent: 10/5/2022 1:20 PM EDT  Subject: IUD    Hi,    I had my IUD placed last Tuesday. I’ve had a decently heavy flow since then. However, my IUD is causing me a great deal of pain. I’m having a lot of pressure in my uterus and it has been hurting to sit down the past two days off and on. Is this normal? If so, how long can I expect for this to last? I would like to try the IUD but it is pretty painful thus far.     Thank you!

## 2022-10-06 ENCOUNTER — OFFICE VISIT (OUTPATIENT)
Dept: OBSTETRICS AND GYNECOLOGY | Facility: CLINIC | Age: 27
End: 2022-10-06

## 2022-10-06 VITALS
WEIGHT: 210 LBS | RESPIRATION RATE: 16 BRPM | DIASTOLIC BLOOD PRESSURE: 80 MMHG | BODY MASS INDEX: 37.21 KG/M2 | SYSTOLIC BLOOD PRESSURE: 124 MMHG

## 2022-10-06 DIAGNOSIS — J30.2 SEASONAL ALLERGIES: ICD-10-CM

## 2022-10-06 DIAGNOSIS — R10.2 PELVIC PAIN: ICD-10-CM

## 2022-10-06 DIAGNOSIS — Z30.431 IUD CHECK UP: Primary | ICD-10-CM

## 2022-10-06 DIAGNOSIS — F41.9 ANXIETY: ICD-10-CM

## 2022-10-06 DIAGNOSIS — N92.6 IRREGULAR BLEEDING: ICD-10-CM

## 2022-10-06 PROCEDURE — 99213 OFFICE O/P EST LOW 20 MIN: CPT | Performed by: NURSE PRACTITIONER

## 2022-10-06 RX ORDER — LEVONORGESTREL 19.5 MG/1
1 INTRAUTERINE DEVICE INTRAUTERINE ONCE
COMMUNITY
End: 2022-11-17

## 2022-10-06 RX ORDER — MONTELUKAST SODIUM 10 MG/1
10 TABLET ORAL NIGHTLY
Qty: 90 TABLET | Refills: 1 | Status: SHIPPED | OUTPATIENT
Start: 2022-10-06 | End: 2022-11-05 | Stop reason: SDUPTHER

## 2022-10-06 NOTE — TELEPHONE ENCOUNTER
Rx Refill Note  Requested Prescriptions     Pending Prescriptions Disp Refills   • busPIRone (BUSPAR) 7.5 MG tablet 90 tablet 1     Sig: Take 1 tablet by mouth Daily.   • fluticasone (FLONASE) 50 MCG/ACT nasal spray 33.3 mL 1     Si sprays into the nostril(s) as directed by provider Daily.   • montelukast (SINGULAIR) 10 MG tablet 90 tablet 1     Sig: Take 1 tablet by mouth Every Night. NEEDS APPOINTMENT FOR NEXT REFILL      Last office visit with prescribing clinician: 2022      Next office visit with prescribing clinician: Visit date not found            Norberto Ling MA  10/06/22, 08:44 EDT

## 2022-10-06 NOTE — PROGRESS NOTES
Problem Visit     Patient Name: Elisabeth Martinez  : 1995   MRN: [id number]   Care Team: Patient Care Team:  Zully Kirkland APRN as PCP - General (Nurse Practitioner)  Antonella Dangelo APRN as Nurse Practitioner (Obstetrics and Gynecology)    Chief Complaint:    Chief Complaint   Patient presents with   • Abdominal Pain     Pain is better today.  Pain is worse on 10/4/22 and 10/5/22       HPI: Elisabeth Martinez is a 27 y.o. year old  presenting to be seen for f/u Kyleena insertion.   Kyleena placed 22 - states she had cramping initially then improved until 4 days ago   States the pain was severe and nothing was helpful - she took Midol and tylenol and used a heating pad   Bldg began 4 days ago and was very heavy when pain was severe   States she was saturating an overnight pad q 2 hrs at heaviest flow   Flow has lightened significantly today   Pain has resolved today as well   No vaginal, urinary, or GI c/o   No F/C/N/V   Hasn't been sexually active since IUD placed     Preliminary u/s report shows correct IUD placement   Ovaries WNL       Subjective      I have reviewed the patients family history, social history, past medical history, past surgical history and have updated it as appropriate.    /80   Resp 16   Wt 95.3 kg (210 lb)   LMP 10/02/2022   Breastfeeding No   BMI 37.21 kg/m²     BMI reviewed: Body mass index is 37.21 kg/m².      Objective     Physical Exam      Neuro: alert and oriented to person, place and time   General:  alert; cooperative; well developed; well nourished   Skin:  Not performed.   Thyroid: not examined   Lungs:  breathing is unlabored   Heart:  Not performed.   Breasts:  Not performed.   Abdomen: Not performed.   Pelvis: Clinical staff was present for exam  External genitalia:  normal appearance of the external genitalia including Bartholin's and [place]'s glands.  :  urethral meatus normal;  Vaginal:  normal pink mucosa without prolapse or  lesions. blood present -  small amount;  Cervix:  normal appearance. IUD string present - 1 cms in length;  Uterus:  normal size, shape and consistency.  Adnexa:  normal bimanual exam of the adnexa.  Rectal:  digital rectal exam not performed; anus visually normal appearing.         Assessment / Plan      Assessment  Problems Addressed This Visit    ICD-10-CM ICD-9-CM   1. IUD check up  Z30.431 V25.42   2. Pelvic pain  R10.2 [id number]   3. Irregular bleeding  N92.6 626.4       Plan    IUD placement confirmed with pelvic u/s    Reviewed expected bldg pattern with Amy   Discussed irregular bldg may cont for 3-6 months after placement   States her last period flow was in July, only spotting in August and Sept   Reviewed warning signs to call for   Pelvic pain resolved now but if returns, she will let me know   Keep 6 wk f/u u/s with string check   Call with questions/concerns before             Follow Up  Return for Next scheduled follow up.  Patient was given instructions and counseling regarding her condition or for health maintenance advice. Please see specific information pulled into the AVS if appropriate.     Lourdes Vick, APRBETTY  October 6, 2022  16:07 EDT

## 2022-10-07 RX ORDER — BUSPIRONE HYDROCHLORIDE 7.5 MG/1
7.5 TABLET ORAL DAILY
Qty: 90 TABLET | Refills: 1 | OUTPATIENT
Start: 2022-10-07

## 2022-10-07 RX ORDER — FLUTICASONE PROPIONATE 50 MCG
2 SPRAY, SUSPENSION (ML) NASAL DAILY
Qty: 33.3 ML | Refills: 1 | OUTPATIENT
Start: 2022-10-07

## 2022-10-11 DIAGNOSIS — R73.03 PRE-DIABETES: ICD-10-CM

## 2022-10-11 DIAGNOSIS — F41.9 ANXIETY: ICD-10-CM

## 2022-10-11 DIAGNOSIS — G43.919 INTRACTABLE MIGRAINE WITHOUT STATUS MIGRAINOSUS, UNSPECIFIED MIGRAINE TYPE: ICD-10-CM

## 2022-10-11 RX ORDER — SUMATRIPTAN 100 MG/1
TABLET, FILM COATED ORAL
Qty: 9 TABLET | Refills: 1 | Status: SHIPPED | OUTPATIENT
Start: 2022-10-11 | End: 2022-12-11

## 2022-10-11 RX ORDER — TOPIRAMATE 50 MG/1
50 TABLET, FILM COATED ORAL 2 TIMES DAILY
Qty: 60 TABLET | Refills: 2 | Status: SHIPPED | OUTPATIENT
Start: 2022-10-11 | End: 2022-11-13 | Stop reason: SDUPTHER

## 2022-10-11 RX ORDER — METFORMIN HYDROCHLORIDE 750 MG/1
750 TABLET, EXTENDED RELEASE ORAL
Qty: 30 TABLET | Refills: 0 | Status: SHIPPED | OUTPATIENT
Start: 2022-10-11 | End: 2022-11-13 | Stop reason: SDUPTHER

## 2022-10-11 RX ORDER — VILAZODONE HYDROCHLORIDE 40 MG/1
40 TABLET ORAL DAILY
Qty: 90 TABLET | Refills: 0 | Status: SHIPPED | OUTPATIENT
Start: 2022-10-11 | End: 2022-11-13 | Stop reason: SDUPTHER

## 2022-11-05 DIAGNOSIS — F41.9 ANXIETY: ICD-10-CM

## 2022-11-05 DIAGNOSIS — J30.2 SEASONAL ALLERGIES: ICD-10-CM

## 2022-11-06 RX ORDER — BUSPIRONE HYDROCHLORIDE 7.5 MG/1
7.5 TABLET ORAL DAILY
Qty: 90 TABLET | Refills: 1 | Status: SHIPPED | OUTPATIENT
Start: 2022-11-06 | End: 2023-02-09 | Stop reason: SDUPTHER

## 2022-11-06 RX ORDER — MONTELUKAST SODIUM 10 MG/1
10 TABLET ORAL NIGHTLY
Qty: 90 TABLET | Refills: 1 | Status: SHIPPED | OUTPATIENT
Start: 2022-11-06 | End: 2023-02-04 | Stop reason: SDUPTHER

## 2022-11-08 ENCOUNTER — OFFICE VISIT (OUTPATIENT)
Dept: OBSTETRICS AND GYNECOLOGY | Facility: CLINIC | Age: 27
End: 2022-11-08

## 2022-11-08 VITALS
DIASTOLIC BLOOD PRESSURE: 70 MMHG | SYSTOLIC BLOOD PRESSURE: 126 MMHG | WEIGHT: 210 LBS | RESPIRATION RATE: 16 BRPM | BODY MASS INDEX: 37.21 KG/M2

## 2022-11-08 DIAGNOSIS — Z30.431 IUD CHECK UP: Primary | ICD-10-CM

## 2022-11-08 DIAGNOSIS — N92.6 IRREGULAR UTERINE BLEEDING: ICD-10-CM

## 2022-11-08 PROCEDURE — 99213 OFFICE O/P EST LOW 20 MIN: CPT | Performed by: NURSE PRACTITIONER

## 2022-11-08 NOTE — PROGRESS NOTES
Problem Visit     Patient Name: Elisabeth Martinez  : 1995   MRN: [id number]   Care Team: Patient Care Team:  Zully Kirkland APRN as PCP - General (Nurse Practitioner)  Antonella Dangelo APRN as Nurse Practitioner (Obstetrics and Gynecology)    Chief Complaint:    6 wk f/u IUD insertion     HPI: Elisabeth Martinez is a 27 y.o. year old  presenting to be seen for 6 wk f/u IUD insertion.   Kyleena placed 22   Since placement she has had almost daily bldg   States bldg has been so heavy the last few days she is saturating a super tampon q 1-2 hrs   Period like cramping very bothersome - worst when bldg is heavy   Cramping is not present daily   States when at it's worst, she took ibuprofen 800mg and that wasn't even helpful   No vaginal c/o     Hx PCOS   Hx migraines with aura     Preliminary u/s report today with correct placement of IUD   Ovaries appear polycystic - no ovarian cysts   Fibroid noted - intramural, 1.2cm size     AV done 2022 with Odessa Ramirez      I have reviewed the patients family history, social history, past medical history, past surgical history and have updated it as appropriate.    /70   Resp 16   Wt 95.3 kg (210 lb)   BMI 37.21 kg/m²     BMI reviewed: Body mass index is 37.21 kg/m².      Objective     Physical Exam      Neuro: alert and oriented to person, place and time   General:  alert; cooperative; well developed; well nourished   Skin:  Not performed.   Thyroid: not examined   Lungs:  breathing is unlabored   Heart:  Not performed.   Breasts:  Not performed.   Abdomen: Not performed.   Pelvis: Clinical staff was present for exam  External genitalia:  normal appearance of the external genitalia including Bartholin's and [place]'s glands.  :  urethral meatus normal;  Vaginal:  blood present -  moderate amount;  Cervix:  normal appearance. IUD string present - 2 cms in length; bldg with clots noted  Uterus:  normal size, shape and  consistency.  Adnexa:  normal bimanual exam of the adnexa.  Rectal:  digital rectal exam not performed; anus visually normal appearing.         Assessment / Plan      Assessment  Problems Addressed This Visit    ICD-10-CM ICD-9-CM   1. IUD check up  Z30.431 V25.42   2. Irregular uterine bleeding  N92.6 626.4       Plan    Reviewed preliminary u/s report confirms appropriate IUD placement   Reviewed expected bldg pattern with Kyleena   Last period like flow prior to Kyleena placement was July 2022   Start Slynd tomorrow - method specific counseling given   Samples x 2 months given   Bldg should decrease and then stop during active pills - may or may not have a period during inactive pills   Discussed period cramping should resolve once bldg stops - if it does not, she will let me know   If bldg does not decrease in the next wk after starting Slynd, she will let me know   Discussed avoidance of NSAIDs with slynd - may take tylenol   F/u in office in 2 months   Call with questions/concerns before then             Follow Up  Return in about 2 months (around 1/8/2023) for Recheck.  Patient was given instructions and counseling regarding her condition or for health maintenance advice. Please see specific information pulled into the AVS if appropriate.     Lourdes Vick, APRBETTY  November 8, 2022  16:24 EST

## 2022-11-13 DIAGNOSIS — R73.03 PRE-DIABETES: ICD-10-CM

## 2022-11-13 DIAGNOSIS — F41.9 ANXIETY: ICD-10-CM

## 2022-11-13 DIAGNOSIS — J30.2 SEASONAL ALLERGIES: ICD-10-CM

## 2022-11-14 RX ORDER — FLUTICASONE PROPIONATE 50 MCG
2 SPRAY, SUSPENSION (ML) NASAL DAILY
Qty: 33.3 ML | Refills: 1 | Status: SHIPPED | OUTPATIENT
Start: 2022-11-14 | End: 2023-03-13 | Stop reason: SDUPTHER

## 2022-11-14 RX ORDER — VILAZODONE HYDROCHLORIDE 40 MG/1
40 TABLET ORAL DAILY
Qty: 90 TABLET | Refills: 0 | Status: SHIPPED | OUTPATIENT
Start: 2022-11-14 | End: 2023-01-10 | Stop reason: SDUPTHER

## 2022-11-14 RX ORDER — TOPIRAMATE 50 MG/1
50 TABLET, FILM COATED ORAL 2 TIMES DAILY
Qty: 60 TABLET | Refills: 2 | Status: SHIPPED | OUTPATIENT
Start: 2022-11-14 | End: 2023-01-10 | Stop reason: SDUPTHER

## 2022-11-14 RX ORDER — METFORMIN HYDROCHLORIDE 750 MG/1
750 TABLET, EXTENDED RELEASE ORAL
Qty: 30 TABLET | Refills: 0 | Status: SHIPPED | OUTPATIENT
Start: 2022-11-14 | End: 2023-01-10 | Stop reason: SDUPTHER

## 2022-11-14 NOTE — TELEPHONE ENCOUNTER
Rx Refill Note  Requested Prescriptions     Pending Prescriptions Disp Refills   • topiramate (Topamax) 50 MG tablet 60 tablet 2     Sig: Take 1 tablet by mouth 2 (Two) Times a Day.      Last filled:10/11/2022  Last office visit with prescribing clinician: 9/14/2022      Next office visit with prescribing clinician: 1/9/2023     Daxa Barber MA  11/14/22, 08:53 EST

## 2022-11-15 ENCOUNTER — TELEPHONE (OUTPATIENT)
Dept: INTERNAL MEDICINE | Facility: CLINIC | Age: 27
End: 2022-11-15

## 2022-11-15 ENCOUNTER — TELEPHONE (OUTPATIENT)
Dept: OBSTETRICS AND GYNECOLOGY | Facility: CLINIC | Age: 27
End: 2022-11-15

## 2022-11-15 NOTE — TELEPHONE ENCOUNTER
"PC to pt to discuss her concerns.  States today she developed pelvic pain again and when she went to urinate, the IUD came out.  States she could feel it and \"it just came out\".  She states she has the entire device.  States she has been very unhappy with this method and that she has had continued pain since it has been placed.  I reviewed with her that she has had two pelvic u/s that both confirmed appropriate placement of the IUD, the last one just being done one week ago.  We discussed that at her visit last week she did not express to me any desire to have the IUD removed, and she did agree that was correct.  We discussed a plan to address the bldg pattern and she was instructed to call with questions/concerns before her f/u in 2 months.    States when she presented with pelvic pain at her visits she felt that her pain was dismissed.  She has been taking the slynd, but she states it has not been helpful at all.    When I tried to further address her concerns, she would not let me continue.  She then stated that she would be cancelling her appts here and would not be contacting me with further concerns, she just wanted to let me know that the IUD had been expelled.  She then proceeded to promptly end the phone call.    "

## 2022-11-15 NOTE — TELEPHONE ENCOUNTER
Please call pharmacy and find out what the problem is with Viibryd.  I was unable to reach the pharmacy today.

## 2022-11-15 NOTE — TELEPHONE ENCOUNTER
Caller: Elisabeth Martinez    Relationship: Self    Best call back number: [phone]    What medications are you currently taking:   Current Outpatient Medications on File Prior to Visit   Medication Sig Dispense Refill   • busPIRone (BUSPAR) 7.5 MG tablet Take 1 tablet by mouth Daily. 90 tablet 1   • cetirizine (zyrTEC) 10 MG tablet Take 1 tablet by mouth Daily. 90 tablet 1   • fluticasone (FLONASE) 50 MCG/ACT nasal spray 2 sprays into the nostril(s) as directed by provider Daily. 33.3 mL 1   • levonorgestrel (Kyleena) 19.5 MG intrauterine device IUD 1 each by Intrauterine route 1 (One) Time.     • metFORMIN ER (GLUCOPHAGE-XR) 750 MG 24 hr tablet Take 1 tablet by mouth Daily With Breakfast. 30 tablet 0   • montelukast (SINGULAIR) 10 MG tablet Take 1 tablet by mouth Every Night. NEEDS APPOINTMENT FOR NEXT REFILL 90 tablet 1   • multivitamin with minerals tablet tablet Take 1 tablet by mouth Daily.     • SUMAtriptan (IMITREX) 100 MG tablet Take 1 TAB @the onset of Migraine as needed. 9 tablet 1   • topiramate (Topamax) 50 MG tablet Take 1 tablet by mouth 2 (Two) Times a Day. 60 tablet 2   • vilazodone (Viibryd) 40 MG tablet tablet Take 1 tablet by mouth Daily. 90 tablet 0     No current facility-administered medications on file prior to visit.          When did you start taking these medications: 01/2022    Which medication are you concerned about: VILAZROMELIA    Who prescribed you this medication: LICHA TOMLINSON    What are your concerns: PATIENT INSURANCE WILL NOT COVER THE MEDICATION. NOT SURE IF IT IS BECAUSE IT IS A 90 DAY SUPPLY OR WHAT. PATIENT IS OUT OF MEDICATION. IT MAY NEED A PRIOR AUTHORIZATION. NOT SURE WHAT TO DO ABOUT IT . PLEASE ADVISE

## 2022-11-16 NOTE — TELEPHONE ENCOUNTER
Left message for patient     PA submitted to the insurance. Awaiting determination.     Key: [id number]

## 2022-11-17 ENCOUNTER — TELEPHONE (OUTPATIENT)
Dept: OBSTETRICS AND GYNECOLOGY | Facility: CLINIC | Age: 27
End: 2022-11-17

## 2022-11-17 ENCOUNTER — APPOINTMENT (OUTPATIENT)
Dept: ULTRASOUND IMAGING | Facility: HOSPITAL | Age: 27
End: 2022-11-17

## 2022-11-17 ENCOUNTER — HOSPITAL ENCOUNTER (EMERGENCY)
Facility: HOSPITAL | Age: 27
Discharge: HOME | End: 2022-11-17
Attending: STUDENT IN AN ORGANIZED HEALTH CARE EDUCATION/TRAINING PROGRAM | Admitting: STUDENT IN AN ORGANIZED HEALTH CARE EDUCATION/TRAINING PROGRAM

## 2022-11-17 VITALS
DIASTOLIC BLOOD PRESSURE: 62 MMHG | RESPIRATION RATE: 19 BRPM | BODY MASS INDEX: 35.44 KG/M2 | HEART RATE: 97 BPM | SYSTOLIC BLOOD PRESSURE: 112 MMHG | OXYGEN SATURATION: 96 % | HEIGHT: 63 IN | TEMPERATURE: 98.5 F | WEIGHT: 200 LBS

## 2022-11-17 DIAGNOSIS — N93.9 VAGINAL BLEEDING PROBLEMS: Primary | ICD-10-CM

## 2022-11-17 DIAGNOSIS — R10.2 PELVIC CRAMPING: ICD-10-CM

## 2022-11-17 DIAGNOSIS — E28.2 PCOS (POLYCYSTIC OVARIAN SYNDROME): ICD-10-CM

## 2022-11-17 LAB
ABO GROUP BLD: NORMAL
ANION GAP SERPL CALCULATED.3IONS-SCNC: 12 MMOL/L (ref 5–15)
APTT PPP: 28.8 SECONDS (ref 60–90)
B-HCG UR QL: NEGATIVE
BACTERIA UR QL AUTO: ABNORMAL /HPF
BASOPHILS # BLD AUTO: 0.08 10*3/MM3 (ref 0–0.2)
BASOPHILS NFR BLD AUTO: 0.7 % (ref 0–1.5)
BILIRUB UR QL STRIP: NEGATIVE
BLD GP AB SCN SERPL QL: NEGATIVE
BUN SERPL-MCNC: 9 MG/DL (ref 6–20)
BUN/CREAT SERPL: 11.4 (ref 7–25)
CALCIUM SPEC-SCNC: 9.7 MG/DL (ref 8.6–10.5)
CHLORIDE SERPL-SCNC: 108 MMOL/L (ref 98–107)
CLARITY UR: CLEAR
CO2 SERPL-SCNC: 21 MMOL/L (ref 22–29)
COLOR UR: ABNORMAL
CREAT SERPL-MCNC: 0.79 MG/DL (ref 0.57–1)
DEPRECATED RDW RBC AUTO: 42.9 FL (ref 37–54)
EGFRCR SERPLBLD CKD-EPI 2021: 105.3 ML/MIN/1.73
EOSINOPHIL # BLD AUTO: 0.15 10*3/MM3 (ref 0–0.4)
EOSINOPHIL NFR BLD AUTO: 1.2 % (ref 0.3–6.2)
ERYTHROCYTE [DISTWIDTH] IN BLOOD BY AUTOMATED COUNT: 15.3 % (ref 12.3–15.4)
EXPIRATION DATE: NORMAL
GLUCOSE SERPL-MCNC: 108 MG/DL (ref 65–99)
GLUCOSE UR STRIP-MCNC: NEGATIVE MG/DL
HCT VFR BLD AUTO: 36.8 % (ref 34–46.6)
HGB BLD-MCNC: 11.5 G/DL (ref 12–15.9)
HGB UR QL STRIP.AUTO: ABNORMAL
HYALINE CASTS UR QL AUTO: ABNORMAL /LPF
IMM GRANULOCYTES # BLD AUTO: 0.06 10*3/MM3 (ref 0–0.05)
IMM GRANULOCYTES NFR BLD AUTO: 0.5 % (ref 0–0.5)
INR PPP: 1 (ref 0.84–1.13)
INTERNAL NEGATIVE CONTROL: NEGATIVE
INTERNAL POSITIVE CONTROL: POSITIVE
KETONES UR QL STRIP: NEGATIVE
LEUKOCYTE ESTERASE UR QL STRIP.AUTO: ABNORMAL
LYMPHOCYTES # BLD AUTO: 3.58 10*3/MM3 (ref 0.7–3.1)
LYMPHOCYTES NFR BLD AUTO: 29.2 % (ref 19.6–45.3)
Lab: NORMAL
MCH RBC QN AUTO: 24.4 PG (ref 26.6–33)
MCHC RBC AUTO-ENTMCNC: 31.3 G/DL (ref 31.5–35.7)
MCV RBC AUTO: 78 FL (ref 79–97)
MONOCYTES # BLD AUTO: 0.85 10*3/MM3 (ref 0.1–0.9)
MONOCYTES NFR BLD AUTO: 6.9 % (ref 5–12)
NEUTROPHILS NFR BLD AUTO: 61.5 % (ref 42.7–76)
NEUTROPHILS NFR BLD AUTO: 7.54 10*3/MM3 (ref 1.7–7)
NITRITE UR QL STRIP: NEGATIVE
NRBC BLD AUTO-RTO: 0 /100 WBC (ref 0–0.2)
PH UR STRIP.AUTO: 7 [PH] (ref 5–8)
PLATELET # BLD AUTO: 339 10*3/MM3 (ref 140–450)
PMV BLD AUTO: 11.6 FL (ref 6–12)
POTASSIUM SERPL-SCNC: 3.6 MMOL/L (ref 3.5–5.2)
PROT UR QL STRIP: ABNORMAL
PROTHROMBIN TIME: 13.1 SECONDS (ref 11.4–14.4)
RBC # BLD AUTO: 4.72 10*6/MM3 (ref 3.77–5.28)
RBC # UR STRIP: ABNORMAL /HPF
REF LAB TEST METHOD: ABNORMAL
RH BLD: POSITIVE
SODIUM SERPL-SCNC: 141 MMOL/L (ref 136–145)
SP GR UR STRIP: 1.02 (ref 1–1.03)
SQUAMOUS #/AREA URNS HPF: ABNORMAL /HPF
T&S EXPIRATION DATE: NORMAL
UROBILINOGEN UR QL STRIP: ABNORMAL
WBC # UR STRIP: ABNORMAL /HPF
WBC NRBC COR # BLD: 12.26 10*3/MM3 (ref 3.4–10.8)

## 2022-11-17 PROCEDURE — 86900 BLOOD TYPING SEROLOGIC ABO: CPT

## 2022-11-17 PROCEDURE — 85730 THROMBOPLASTIN TIME PARTIAL: CPT | Performed by: STUDENT IN AN ORGANIZED HEALTH CARE EDUCATION/TRAINING PROGRAM

## 2022-11-17 PROCEDURE — 85025 COMPLETE CBC W/AUTO DIFF WBC: CPT | Performed by: STUDENT IN AN ORGANIZED HEALTH CARE EDUCATION/TRAINING PROGRAM

## 2022-11-17 PROCEDURE — 36415 COLL VENOUS BLD VENIPUNCTURE: CPT

## 2022-11-17 PROCEDURE — 25010000002 KETOROLAC TROMETHAMINE PER 15 MG: Performed by: STUDENT IN AN ORGANIZED HEALTH CARE EDUCATION/TRAINING PROGRAM

## 2022-11-17 PROCEDURE — 96374 THER/PROPH/DIAG INJ IV PUSH: CPT

## 2022-11-17 PROCEDURE — 81025 URINE PREGNANCY TEST: CPT | Performed by: STUDENT IN AN ORGANIZED HEALTH CARE EDUCATION/TRAINING PROGRAM

## 2022-11-17 PROCEDURE — 86901 BLOOD TYPING SEROLOGIC RH(D): CPT

## 2022-11-17 PROCEDURE — 85610 PROTHROMBIN TIME: CPT | Performed by: STUDENT IN AN ORGANIZED HEALTH CARE EDUCATION/TRAINING PROGRAM

## 2022-11-17 PROCEDURE — 93005 ELECTROCARDIOGRAM TRACING: CPT | Performed by: STUDENT IN AN ORGANIZED HEALTH CARE EDUCATION/TRAINING PROGRAM

## 2022-11-17 PROCEDURE — 86900 BLOOD TYPING SEROLOGIC ABO: CPT | Performed by: STUDENT IN AN ORGANIZED HEALTH CARE EDUCATION/TRAINING PROGRAM

## 2022-11-17 PROCEDURE — 81001 URINALYSIS AUTO W/SCOPE: CPT | Performed by: STUDENT IN AN ORGANIZED HEALTH CARE EDUCATION/TRAINING PROGRAM

## 2022-11-17 PROCEDURE — 96361 HYDRATE IV INFUSION ADD-ON: CPT

## 2022-11-17 PROCEDURE — 80048 BASIC METABOLIC PNL TOTAL CA: CPT | Performed by: STUDENT IN AN ORGANIZED HEALTH CARE EDUCATION/TRAINING PROGRAM

## 2022-11-17 PROCEDURE — 86901 BLOOD TYPING SEROLOGIC RH(D): CPT | Performed by: STUDENT IN AN ORGANIZED HEALTH CARE EDUCATION/TRAINING PROGRAM

## 2022-11-17 PROCEDURE — 86850 RBC ANTIBODY SCREEN: CPT | Performed by: STUDENT IN AN ORGANIZED HEALTH CARE EDUCATION/TRAINING PROGRAM

## 2022-11-17 PROCEDURE — 76830 TRANSVAGINAL US NON-OB: CPT

## 2022-11-17 PROCEDURE — 99284 EMERGENCY DEPT VISIT MOD MDM: CPT

## 2022-11-17 RX ORDER — OXYCODONE HYDROCHLORIDE AND ACETAMINOPHEN 5; 325 MG/1; MG/1
1 TABLET ORAL ONCE
Status: COMPLETED | OUTPATIENT
Start: 2022-11-17 | End: 2022-11-17

## 2022-11-17 RX ORDER — KETOROLAC TROMETHAMINE 15 MG/ML
15 INJECTION, SOLUTION INTRAMUSCULAR; INTRAVENOUS ONCE
Status: COMPLETED | OUTPATIENT
Start: 2022-11-17 | End: 2022-11-17

## 2022-11-17 RX ORDER — IBUPROFEN 600 MG/1
600 TABLET ORAL EVERY 6 HOURS PRN
Qty: 20 TABLET | Refills: 0 | Status: SHIPPED | OUTPATIENT
Start: 2022-11-17 | End: 2022-11-22

## 2022-11-17 RX ORDER — OXYCODONE HYDROCHLORIDE 5 MG/1
5 TABLET ORAL EVERY 6 HOURS PRN
Qty: 6 TABLET | Refills: 0 | Status: SHIPPED | OUTPATIENT
Start: 2022-11-17 | End: 2022-11-22

## 2022-11-17 RX ADMIN — OXYCODONE HYDROCHLORIDE AND ACETAMINOPHEN 1 TABLET: 5; 325 TABLET ORAL at 16:35

## 2022-11-17 RX ADMIN — SODIUM CHLORIDE, POTASSIUM CHLORIDE, SODIUM LACTATE AND CALCIUM CHLORIDE 1000 ML: 600; 310; 30; 20 INJECTION, SOLUTION INTRAVENOUS at 15:10

## 2022-11-17 RX ADMIN — KETOROLAC TROMETHAMINE 15 MG: 15 INJECTION, SOLUTION INTRAMUSCULAR; INTRAVENOUS at 15:10

## 2022-11-17 RX ADMIN — NORETHINDRONE ACETATE 5 MG: 5 TABLET ORAL at 17:30

## 2022-11-17 NOTE — TELEPHONE ENCOUNTER
DELETE AFTER REVIEWING: Telephone encounter to be sent to the clinical pool     Caller: ENOCH FONG    Relationship to patient: Mother    Best call back number: [phone]    Chief complaint: HEAVY BLEEDING; LIGHT HEADED, DIZZY, IUD CAME OUT Tuesday.    Patient directed to call 911 or go to their nearest emergency room.     Patient verbalized understanding: [x] Yes  [] No  If no, why?    Additional notes: PT'S MOM CALLED AND SHE AND PT WERE AT THE OFFICE. WE DIRECTED HER TO GO TO THE ER.

## 2022-11-17 NOTE — DISCHARGE INSTRUCTIONS
Take the provided hormone therapy as directed by OB/GYN.  You should take it 4 times a day for the next 3 days.  Then 3 times a day for 3 days, and then twice a day until the bleeding stops.  Contact gynecology for follow-up.  Continue to monitor symptoms and please return to the ED or seek other medical care for any concerning symptoms.

## 2022-11-17 NOTE — TELEPHONE ENCOUNTER
Please let patient know insurance PA has been denied; we can try trazodone which is in the same class.

## 2022-11-17 NOTE — TELEPHONE ENCOUNTER
PC from Dr. Starr in ER.  States pt presented today with heavy bldg.  Hgb 11.5.  Pelvic u/s WNL - EMS 3mm.  She has stopped slynd and the u/s confirms there is no IUD in place.    Considering hx of migraine with aura, recommend he give Aygestin 5mg QID x 3 days, TID x 3 days, then BID until bldg stops.    Considering she expressed to me that she will not be returning to see me for any appts, he will let her know that she does need to f/u with GYN provider of her choice.     06-Oct-2022

## 2022-11-18 ENCOUNTER — TELEPHONE (OUTPATIENT)
Dept: OBSTETRICS AND GYNECOLOGY | Facility: CLINIC | Age: 27
End: 2022-11-18

## 2022-11-18 LAB
QT INTERVAL: 358 MS
QTC INTERVAL: 457 MS

## 2022-11-18 NOTE — ED PROVIDER NOTES
EMERGENCY DEPARTMENT ENCOUNTER    Pt Name: Elisabeth Martinez  MRN: [id number]  Pt :   1995  Room Number:    Date of encounter:  2022  PCP: Zully Kirkland APRN  ED Provider: Stephen Starr MD    Historian: Patient      HPI:  Chief Complaint: Vaginal bleeding, pelvic pain        Context: Elisabeth Martinez is a 27-year-old female who presents to the emergency department for heavy vaginal bleeding after removal of her IUD 2 days ago.  She said it was 2 months ago she had her IUD placed she has seen her gynecologist Lourdes Parker twice now because of not tolerating pelvic pain and breakthrough bleeding since it was placed.  She says on Tuesday when she was in the shower she could feel it coming out and was able to pull the IUD out.  At the time she had improvement in her pelvic pain but has had mild bleeding since then and then heavy bleeding today she says she has soaked through 8 pads and is passing clots.  She has had associated lightheadedness without pain.  Reinserts her pelvic pain is actually improved since the IUD came out.  She called her gynecologist who told her to come to the emergency room.  No appreciated fevers or systemic symptoms.  No other complaints at this time.      PAST MEDICAL HISTORY  Past Medical History:   Diagnosis Date   • Anemia    • Anxiety    • Asthma    • Depression    • Hypothyroidism    • Kidney stone    • Migraine    • Ovarian cyst    • PCOS (polycystic ovarian syndrome)    • PCOS (polycystic ovarian syndrome)          PAST SURGICAL HISTORY  No past surgical history on file.      FAMILY HISTORY  Family History   Problem Relation Age of Onset   • Thyroid disease Mother    • Hypertension Mother    • Seizures Mother    • Arthritis Mother    • Heart disease Maternal Grandmother    • Stroke Maternal Grandmother    • Kidney disease Maternal Grandfather    • Cancer Maternal Grandfather    • Diabetes Maternal Grandfather          SOCIAL HISTORY  Social  History     Socioeconomic History   • Marital status: Single   Tobacco Use   • Smoking status: Never   • Smokeless tobacco: Never   Vaping Use   • Vaping Use: Never used   Substance and Sexual Activity   • Alcohol use: Yes     Comment: RARELY   • Drug use: Never   • Sexual activity: Yes     Partners: Male     Birth control/protection: Condom         ALLERGIES  Patient has no known allergies.        REVIEW OF SYSTEMS  Review of Systems       All systems reviewed and negative except for those discussed in HPI.       PHYSICAL EXAM    I have reviewed the triage vital signs and nursing notes.    ED Triage Vitals [11/17/22 1331]   Temp Heart Rate Resp BP SpO2   98.5 °F (36.9 °C) 118 19 144/92 99 %      Temp src Heart Rate Source Patient Position BP Location FiO2 (%)   Oral Monitor Sitting Left arm --       Physical Exam  GENERAL:   Appears uncomfortable but in no acute distress  HENT: Nares patent.  EYES: No scleral icterus.  CV: Regular rhythm, regular rate.  (Tachycardia from initial vital seems to resolved)  RESPIRATORY: Normal effort.  No audible wheezes, rales or rhonchi.  ABDOMEN: Soft, nontender  Gynecologic: No vaginal lesions or lacerations, dark blood in the vaginal vault without active pulsatile bleeding or red blood, cervical os is closed.  MUSCULOSKELETAL: No deformities.   NEURO: Alert, moves all extremities, follows commands.  SKIN: Warm, dry, no rash visualized.        LAB RESULTS  Recent Results (from the past 24 hour(s))   Urinalysis With Microscopic If Indicated (No Culture) - Urine, Clean Catch    Collection Time: 11/17/22  1:52 PM    Specimen: Urine, Clean Catch   Result Value Ref Range    Color, UA Orange (A) Yellow, Straw    Appearance, UA Clear Clear    pH, UA 7.0 5.0 - 8.0    Specific Gravity, UA 1.016 1.001 - 1.030    Glucose, UA Negative Negative    Ketones, UA Negative Negative    Bilirubin, UA Negative Negative    Blood, UA Large (3+) (A) Negative    Protein, UA Trace (A) Negative    Leuk  Esterase, UA Trace (A) Negative    Nitrite, UA Negative Negative    Urobilinogen, UA 0.2 E.U./dL 0.2 - 1.0 E.U./dL   Urinalysis, Microscopic Only - Urine, Clean Catch    Collection Time: 11/17/22  1:52 PM    Specimen: Urine, Clean Catch   Result Value Ref Range    RBC, UA Too Numerous to Count (A) None Seen, 0-2 /HPF    WBC, UA 0-2 None Seen, 0-2 /HPF    Bacteria, UA None Seen None Seen, Trace /HPF    Squamous Epithelial Cells, UA 0-2 None Seen, 0-2 /HPF    Hyaline Casts, UA None Seen 0 - 6 /LPF    Methodology Automated Microscopy    POC Urine Pregnancy    Collection Time: 11/17/22  1:55 PM    Specimen: Urine   Result Value Ref Range    HCG, Urine, QL Negative Negative    Lot Number [id number]     Internal Positive Control Positive Positive, Passed    Internal Negative Control Negative Negative, Passed    Expiration Date 11/30/2022    Basic Metabolic Panel    Collection Time: 11/17/22  2:03 PM    Specimen: Blood   Result Value Ref Range    Glucose 108 (H) 65 - 99 mg/dL    BUN 9 6 - 20 mg/dL    Creatinine 0.79 0.57 - 1.00 mg/dL    Sodium 141 136 - 145 mmol/L    Potassium 3.6 3.5 - 5.2 mmol/L    Chloride 108 (H) 98 - 107 mmol/L    CO2 21.0 (L) 22.0 - 29.0 mmol/L    Calcium 9.7 8.6 - 10.5 mg/dL    BUN/Creatinine Ratio 11.4 7.0 - 25.0    Anion Gap 12.0 5.0 - 15.0 mmol/L    eGFR 105.3 >60.0 mL/min/1.73   Protime-INR    Collection Time: 11/17/22  2:03 PM    Specimen: Blood   Result Value Ref Range    Protime 13.1 11.4 - 14.4 Seconds    INR 1.00 0.84 - 1.13   aPTT    Collection Time: 11/17/22  2:03 PM    Specimen: Blood   Result Value Ref Range    PTT 28.8 (L) 60.0 - 90.0 seconds   Type & Screen    Collection Time: 11/17/22  2:03 PM    Specimen: Blood   Result Value Ref Range    ABO Type B     RH type Positive     Antibody Screen Negative     T&S Expiration Date 11/20/2022 11:59:59 PM    CBC Auto Differential    Collection Time: 11/17/22  2:03 PM    Specimen: Blood   Result Value Ref Range    WBC 12.26 (H) 3.40 - 10.80  10*3/mm3    RBC 4.72 3.77 - 5.28 10*6/mm3    Hemoglobin 11.5 (L) 12.0 - 15.9 g/dL    Hematocrit 36.8 34.0 - 46.6 %    MCV 78.0 (L) 79.0 - 97.0 fL    MCH 24.4 (L) 26.6 - 33.0 pg    MCHC 31.3 (L) 31.5 - 35.7 g/dL    RDW 15.3 12.3 - 15.4 %    RDW-SD 42.9 37.0 - 54.0 fl    MPV 11.6 6.0 - 12.0 fL    Platelets 339 140 - 450 10*3/mm3    Neutrophil % 61.5 42.7 - 76.0 %    Lymphocyte % 29.2 19.6 - 45.3 %    Monocyte % 6.9 5.0 - 12.0 %    Eosinophil % 1.2 0.3 - 6.2 %    Basophil % 0.7 0.0 - 1.5 %    Immature Grans % 0.5 0.0 - 0.5 %    Neutrophils, Absolute 7.54 (H) 1.70 - 7.00 10*3/mm3    Lymphocytes, Absolute 3.58 (H) 0.70 - 3.10 10*3/mm3    Monocytes, Absolute 0.85 0.10 - 0.90 10*3/mm3    Eosinophils, Absolute 0.15 0.00 - 0.40 10*3/mm3    Basophils, Absolute 0.08 0.00 - 0.20 10*3/mm3    Immature Grans, Absolute 0.06 (H) 0.00 - 0.05 10*3/mm3    nRBC 0.0 0.0 - 0.2 /100 WBC   ECG 12 Lead Syncope    Collection Time: 11/17/22  2:23 PM   Result Value Ref Range    QT Interval 358 ms    QTC Interval 457 ms       If labs were ordered, I independently reviewed the results.        RADIOLOGY  US Non-ob Transvaginal    Result Date: 11/17/2022  DATE OF EXAM: 11/17/2022 3:17 PM  PROCEDURE: US NON-OB TRANSVAGINAL-  INDICATIONS: heavy vaginal bleeding after IUD fell out, concern for retained foreign body.  COMPARISON: 11/08/2022  TECHNIQUE: Transvaginal pelvic ultrasound was performed.  Grayscale and color Doppler imaging was utilized to evaluate the pelvic area with image documentation per protocol.  FINDINGS: Uterus measures 7.6 x 4.2 x 4.3 cm again seen within the posterior fundus is a well-circumscribed hypoechoic 10 x 7 mm mass consistent with uterine fibroid.  No other myometrial mass identified.  Endometrial stripe is within normal limits measuring 3 mm.  Right ovary measures 3.0 x 1.8 x 1.4 cm.  Left ovary measures 2.7 x 2.8 x 2.6 cm.  There is no evidence of ovarian cyst or adnexal mass.  There is normal color Doppler flow to both  ovaries.       1.  No change in hypoechoic 1 cm mass within the posterior fundus favored to be uterine fibroid. 2.  Normal appearance of the ovaries bilaterally. 3.  Normal appearance of the endometrial stripe measuring 3 mm.  This report was finalized on 11/17/2022 3:40 PM by Chente Barton MD.        I ordered and reviewed the above noted radiographic studies.      I viewed images of transvaginal ultrasound which shows small 1 cm mass consistent with fibroid patient says this is known and is unchanged from prior imaging.  Normal bilateral ovaries and 3 mm endometrial stripe without any retained products.    See radiologist's dictation for official interpretation.        PROCEDURES    Procedures    ECG 12 Lead Syncope   Preliminary Result   Test Reason : Syncope   Blood Pressure :   */*   mmHG   Vent. Rate :  98 BPM     Atrial Rate :  98 BPM      P-R Int : 186 ms          QRS Dur :  78 ms       QT Int : 358 ms       P-R-T Axes :  29   1  15 degrees      QTc Int : 457 ms      Normal sinus rhythm   Cannot rule out Anterior infarct (cited on or before 17-NOV-2022)   Abnormal ECG   When compared with ECG of 30-OCT-2021 19:42,   No significant change was found      Referred By: EDMD           Confirmed By:           MEDICATIONS GIVEN IN ER    Medications   lactated ringers bolus 1,000 mL (0 mL Intravenous Stopped 11/17/22 1800)   ketorolac (TORADOL) injection 15 mg (15 mg Intravenous Given 11/17/22 1510)   oxyCODONE-acetaminophen (PERCOCET) 5-325 MG per tablet 1 tablet (1 tablet Oral Given 11/17/22 1635)   norethindrone (AYGESTIN) tablet 5 mg (5 mg Oral Given 11/17/22 1730)         PROGRESS, DATA ANALYSIS, CONSULTS, AND MEDICAL DECISION MAKING    All labs have been independently reviewed by me.  All radiology studies have been reviewed by me and the radiologist dictating the report.   EKG's have been independently viewed and interpreted by me.            ED Course as of 11/17/22 2022   Thu Nov 17, 2022   7586 This is a  27-year-old female who presents to the emergency department for heavy vaginal bleeding after removal of her IUD 2 days ago.  She said it was 2 months ago she had her IUD placed she has seen her gynecologist Lourdes Parker twice now because of not tolerating pelvic pain and breakthrough bleeding since it was placed.  She says on Tuesday when she was in the shower she could feel it coming out and was able to pull the IUD out.  At the time she had improvement in her pelvic pain but has had mild bleeding since then and then heavy bleeding today she says she has soaked through 8 pads and is passing clots.  She has had associated lightheadedness without pain.  Reinserts her pelvic pain is actually improved since the IUD came out.  She called her gynecologist who told her to come to the emergency room.  No appreciated fevers or systemic symptoms.  No other complaints at this time. [CC]      ED Course User Index  [CC] Stephen Starr MD     She arrived awake and alert initially tachycardic this resolved prior to intervention.  GYN exam shows dark blood in the vaginal vault no active bright red blood or pulsatile bleeding closed cervical os no appreciated lesions.  Mild leukocytosis appears consistent with prior values.  Hemoglobin of 11.5 slightly decreased from last check but is also stable with prior values.  Blood on urinalysis but no concern for infection.  Pregnancy test is negative.  Transvaginal ultrasound reveals a small fibroid which is unchanged from prior imaging and 3 mm endometrial stripe.  I discussed with Lourdes Vick her gynecology care who says she is not sure the patient wants to see her again was shouting and cursing at her the last time she called her but assume she is no longer taking the progesterone therapy that she prescribes she recommends starting her on a aygestin protocol and having her follow-up with them.  She recommends the patient take 4 doses a day for 3 days then 3 doses a  day for 3 days and then 2 doses a day until the bleeding resolves.  Patient is called out several times for pain medication required Toradol and then ultimately oxycodone.  She will follow-up with gynecology in the morning.  Counseled on strict return precautions verbally expressed understanding of these.        AS OF 20:22 EST VITALS:    BP - 112/62  HR - 97  TEMP - 98.5 °F (36.9 °C) (Oral)  O2 SATS - 96%      SYLVIE reviewed by Stephen Starr MD           DIAGNOSIS  Final diagnoses:   Vaginal bleeding problems   Pelvic cramping   PCOS (polycystic ovarian syndrome)         DISPOSITION  DISCHARGE    Patient discharged in stable condition.    Reviewed implications of results, diagnosis, meds, responsibility to follow up, warning signs and symptoms of possible worsening, potential complications and reasons to return to ER.    Patient/Family voiced understanding of above instructions.    Discussed plan for discharge, as there is no emergent indication for admission.  Pt/family is agreeable and understands need for follow up and possible repeat testing.  Pt/family is aware that discharge does not mean that nothing is wrong but that it indicates no emergency is currently present that requires admission and they must continue care with follow-up as given below or with a physician of their choice.     FOLLOW-UP  Lourdes Vick, PATRIC  [address] [address]  [phone]    Call            Medication List      New Prescriptions    ibuprofen 600 MG tablet  Commonly known as: ADVIL,MOTRIN  Take 1 tablet by mouth Every 6 (Six) Hours As Needed for Mild Pain.     norethindrone 5 MG tablet  Commonly known as: Aygestin  Take 1 tablet by mouth Daily.     oxyCODONE 5 MG immediate release tablet  Commonly known as: Roxicodone  Take 1 tablet by mouth Every 6 (Six) Hours As Needed for Moderate Pain or Severe Pain.           Where to Get Your Medications      These medications were sent to  [facility] [id number] - [place], [place] - [address] [place] [address] - [phone]  - [phone]   [address] [facility] [address] [id number]    Phone: [phone]   · ibuprofen 600 MG tablet  · norethindrone 5 MG tablet  · oxyCODONE 5 MG immediate release tablet                    Stephen Starr MD  11/17/22 2027

## 2022-11-18 NOTE — TELEPHONE ENCOUNTER
I have called Elisabeth to offer an appointment this morning.  [place] is only in the office this morning.  She states that she is on medication prescribed by the ER doctor and would be unable to drive this morning.  She asks if we could see her on Monday?    At this time we have no openings, but I have asked the patient to call back on Monday morning.  I have told her that we often get cancellations and that we will make every effort to work her in on Monday.  She voiced understanding and will call back on Monday morning.

## 2022-11-18 NOTE — TELEPHONE ENCOUNTER
Pt called. She states she was seen in ER and her ER doctor had told her to be seen for a follow up today 11/18 with PATRIC Rojas.    She has seen PATRIC Freed for her IUD placement, but requested not to see her moving forward.    Please advise.

## 2022-11-21 NOTE — TELEPHONE ENCOUNTER
3 unsuccessful attempts to reach patient. Please advise    Attempt #3 I left a message on the patients voicemail to call our office back, office number provided.

## 2022-11-22 ENCOUNTER — OFFICE VISIT (OUTPATIENT)
Dept: OBSTETRICS AND GYNECOLOGY | Facility: CLINIC | Age: 27
End: 2022-11-22

## 2022-11-22 VITALS
BODY MASS INDEX: 36.93 KG/M2 | SYSTOLIC BLOOD PRESSURE: 126 MMHG | DIASTOLIC BLOOD PRESSURE: 80 MMHG | WEIGHT: 208.4 LBS | HEIGHT: 63 IN

## 2022-11-22 DIAGNOSIS — Z30.011 ENCOUNTER FOR INITIAL PRESCRIPTION OF CONTRACEPTIVE PILLS: Primary | ICD-10-CM

## 2022-11-22 DIAGNOSIS — E88.81 INSULIN RESISTANCE: ICD-10-CM

## 2022-11-22 DIAGNOSIS — E28.2 PCOS (POLYCYSTIC OVARIAN SYNDROME): ICD-10-CM

## 2022-11-22 DIAGNOSIS — R73.03 PRE-DIABETES: ICD-10-CM

## 2022-11-22 DIAGNOSIS — Z09 FOLLOW-UP EXAM: ICD-10-CM

## 2022-11-22 PROCEDURE — 99213 OFFICE O/P EST LOW 20 MIN: CPT | Performed by: NURSE PRACTITIONER

## 2022-11-22 RX ORDER — ACETAMINOPHEN AND CODEINE PHOSPHATE 120; 12 MG/5ML; MG/5ML
1 SOLUTION ORAL DAILY
Qty: 28 TABLET | Refills: 12 | Status: SHIPPED | OUTPATIENT
Start: 2022-11-22 | End: 2023-11-22

## 2022-11-22 NOTE — PROGRESS NOTES
Chief Complaint  Elisabeth Martinez is a 27 y.o.  female presenting for Follow-up (Follow-up after ED visit.) and Results (Discuss lab results from ED.)    History of Present Illness  Elisabeth is a 28yo nulligravid woman.  She had pain and bleeding, and eventual partial / or complete expulsion of IUD.  She is finishing up Aygestin course, and today is the first day of no bleeding since the IUD came out.  She has migraines with aura, and she understands that estrogen containing methods are not recommended for her.  She does not want Nexplanon or IUD (has had both).  We discussed DepoProvera.  And POPs, which she is willing to do.      She is concerned about wt gain, and difficulty getting wt off, despite trying to do healthy dieting and exercise.  Her known endocrine related dx include PCOS, Pre-DM, and insulin resistance.  She would like to be referred to Endocrinology Specialist.      The following portions of the patient's history were reviewed and updated as appropriate: allergies, current medications, past family history, past medical history, past social history, past surgical history and problem list.    No Known Allergies      Current Outpatient Medications:   •  busPIRone (BUSPAR) 7.5 MG tablet, Take 1 tablet by mouth Daily., Disp: 90 tablet, Rfl: 1  •  cetirizine (zyrTEC) 10 MG tablet, Take 1 tablet by mouth Daily., Disp: 90 tablet, Rfl: 1  •  fluticasone (FLONASE) 50 MCG/ACT nasal spray, 2 sprays into the nostril(s) as directed by provider Daily., Disp: 33.3 mL, Rfl: 1  •  metFORMIN ER (GLUCOPHAGE-XR) 750 MG 24 hr tablet, Take 1 tablet by mouth Daily With Breakfast., Disp: 30 tablet, Rfl: 0  •  montelukast (SINGULAIR) 10 MG tablet, Take 1 tablet by mouth Every Night. NEEDS APPOINTMENT FOR NEXT REFILL, Disp: 90 tablet, Rfl: 1  •  multivitamin with minerals tablet tablet, Take 1 tablet by mouth Daily., Disp: , Rfl:   •  norethindrone (MICRONOR) 0.35 MG tablet, Take 1 tablet by mouth Daily., Disp: 28  "tablet, Rfl: 12  •  SUMAtriptan (IMITREX) 100 MG tablet, Take 1 TAB @the onset of Migraine as needed., Disp: 9 tablet, Rfl: 1  •  topiramate (Topamax) 50 MG tablet, Take 1 tablet by mouth 2 (Two) Times a Day., Disp: 60 tablet, Rfl: 2  •  vilazodone (Viibryd) 40 MG tablet tablet, Take 1 tablet by mouth Daily., Disp: 90 tablet, Rfl: 0    Past Medical History:   Diagnosis Date   • Anemia    • Anxiety    • Asthma    • Depression    • Hypothyroidism    • Kidney stone    • Migraine    • Ovarian cyst    • PCOS (polycystic ovarian syndrome)    • PCOS (polycystic ovarian syndrome)         History reviewed. No pertinent surgical history.    Objective  /80   Ht 160 cm (63\")   Wt 94.5 kg (208 lb 6.4 oz)   LMP 09/28/2022 (Approximate)   Breastfeeding No   BMI 36.92 kg/m²     Physical Exam  Vitals and nursing note reviewed. Exam conducted with a chaperone present.   Constitutional:       Appearance: Normal appearance.   Abdominal:      Palpations: Abdomen is soft. There is no mass.      Tenderness: There is no abdominal tenderness.   Genitourinary:     General: Normal vulva.      Labia:         Right: No rash, tenderness or lesion.         Left: No tenderness or lesion.       Vagina: Normal. No erythema.      Cervix: No cervical motion tenderness, discharge, lesion or erythema.      Uterus: Normal. Not enlarged and not tender.       Adnexa: Right adnexa normal and left adnexa normal.        Right: No mass or tenderness.          Left: No mass or tenderness.        Rectum: Normal.   Skin:     General: Skin is warm and dry.   Neurological:      Mental Status: She is oriented to person, place, and time.   Psychiatric:         Behavior: Behavior normal.         Assessment/Plan   Diagnoses and all orders for this visit:    1. Encounter for initial prescription of contraceptive pills (Primary)  -     norethindrone (MICRONOR) 0.35 MG tablet; Take 1 tablet by mouth Daily.  Dispense: 28 tablet; Refill: 12    2. Follow-up " exam    3. BMI 36.0-36.9,adult  -     Ambulatory Referral to Endocrinology  -     TSH; Future  -     Hemoglobin A1c; Future  -     Insulin, Random; Future    4. PCOS (polycystic ovarian syndrome)  -     Comprehensive Metabolic Panel; Future  -     Hemoglobin A1c; Future  -     Insulin, Random; Future    5. Insulin resistance  -     Comprehensive Metabolic Panel; Future  -     Hemoglobin A1c; Future  -     Insulin, Random; Future    6. Pre-diabetes  -     Comprehensive Metabolic Panel; Future  -     Hemoglobin A1c; Future  -     Insulin, Random; Future        Procedures    19 to 39: Counseling/Anticipatory Guidance Discussed: family planning/contraception and Labs from ER    Return in about 3 months (around 2/22/2023) for Recheck (3 mo FU POPs).    Antonella Dangelo, APRN  11/22/2022

## 2022-12-11 RX ORDER — RIMEGEPANT SULFATE 75 MG/75MG
75 TABLET, ORALLY DISINTEGRATING ORAL AS NEEDED
Qty: 8 TABLET | Refills: 3 | Status: SHIPPED | OUTPATIENT
Start: 2022-12-11 | End: 2023-01-10 | Stop reason: SDUPTHER

## 2022-12-14 RX ORDER — TOPIRAMATE 50 MG/1
50 TABLET, FILM COATED ORAL 2 TIMES DAILY
Qty: 60 TABLET | Refills: 2 | OUTPATIENT
Start: 2022-12-14

## 2022-12-14 NOTE — TELEPHONE ENCOUNTER
Rx Refill Note  Requested Prescriptions     Pending Prescriptions Disp Refills   • topiramate (Topamax) 50 MG tablet 60 tablet 2     Sig: Take 1 tablet by mouth 2 (Two) Times a Day.      Last filled:  Last office visit with prescribing clinician: 9/14/2022      Next office visit with prescribing clinician: 1/9/2023     Jean Kim MA  12/14/22, 08:13 EST

## 2023-01-10 DIAGNOSIS — F41.9 ANXIETY: ICD-10-CM

## 2023-01-10 DIAGNOSIS — R73.03 PRE-DIABETES: ICD-10-CM

## 2023-01-11 ENCOUNTER — TELEPHONE (OUTPATIENT)
Dept: INTERNAL MEDICINE | Facility: CLINIC | Age: 28
End: 2023-01-11
Payer: COMMERCIAL

## 2023-01-11 RX ORDER — METFORMIN HYDROCHLORIDE 750 MG/1
750 TABLET, EXTENDED RELEASE ORAL
Qty: 30 TABLET | Refills: 0 | Status: SHIPPED | OUTPATIENT
Start: 2023-01-11 | End: 2023-02-09 | Stop reason: SDUPTHER

## 2023-01-11 RX ORDER — TOPIRAMATE 50 MG/1
50 TABLET, FILM COATED ORAL 2 TIMES DAILY
Qty: 60 TABLET | Refills: 2 | Status: SHIPPED | OUTPATIENT
Start: 2023-01-11 | End: 2023-03-17 | Stop reason: SDUPTHER

## 2023-01-11 RX ORDER — VILAZODONE HYDROCHLORIDE 40 MG/1
40 TABLET ORAL DAILY
Qty: 90 TABLET | Refills: 0 | Status: SHIPPED | OUTPATIENT
Start: 2023-01-11 | End: 2023-02-09 | Stop reason: SDUPTHER

## 2023-01-11 RX ORDER — RIMEGEPANT SULFATE 75 MG/75MG
75 TABLET, ORALLY DISINTEGRATING ORAL AS NEEDED
Qty: 8 TABLET | Refills: 3 | Status: SHIPPED | OUTPATIENT
Start: 2023-01-11 | End: 2023-02-09

## 2023-01-11 NOTE — TELEPHONE ENCOUNTER
Rx Refill Note  Requested Prescriptions     Pending Prescriptions Disp Refills   • topiramate (Topamax) 50 MG tablet 60 tablet 2     Sig: Take 1 tablet by mouth 2 (Two) Times a Day.   • Rimegepant Sulfate (Nurtec) 75 MG tablet dispersible tablet 8 tablet 3     Sig: Take 1 tablet by mouth As Needed (MIGRAINE).      Last filled: Switching both to [facility]. Done. Thanks!  Last office visit with prescribing clinician: 9/14/2022      Next office visit with prescribing clinician: Visit date not found     Jill Reed MA  01/11/23, 09:12 EST

## 2023-01-13 NOTE — TELEPHONE ENCOUNTER
PA denied. Please advise.    Insurance states patient has to try Trintellix(vortioxetine) first before they will cover Vilazodone

## 2023-01-13 NOTE — TELEPHONE ENCOUNTER
Patient notified. She states she uses a copay card to get rx. Asked for us to fax the denial letter to the pharmacy. I faxed the letter to the pharmacy and got confirmation that it was sent.

## 2023-01-13 NOTE — TELEPHONE ENCOUNTER
Please call patient and let her know that insurance is requiring us to try Trintellix.  Let me know if she would like to try Trintellix.

## 2023-01-17 ENCOUNTER — TELEPHONE (OUTPATIENT)
Dept: NEUROLOGY | Facility: CLINIC | Age: 28
End: 2023-01-17
Payer: COMMERCIAL

## 2023-01-17 NOTE — TELEPHONE ENCOUNTER
Caller: ENMA    Relationship: SELF    Best call back number: [phone]    What orders are you requesting (i.e. lab or imaging): MRI BRAIN W/WO    In what timeframe would the patient need to come in: BEFORE CURRENT APPOINTMENT WITH     Where will you receive your lab/imaging services: [facility]    Additional notes: PT STATES HER MIGRAINES ARE GETTING EXTREMELY INTENSE, SHE ALSO MENTIONS HER LEFT LEG/FOOT HAS BEEN GOING NUMB AT RANDOM MOMENTS WORSE THEN THE SIDE EFFECTS OF THE TOPAMAX (SHE EXPLAINS THE NUMBNESS AS A WEIRD FEELING AND ALSO STATES HER LEG FEELS WEAK). SHE ALSO MENTIONS THAT SHE HAS A FAMILY HX OF MS AND SHE IS CONCERNED.

## 2023-01-18 DIAGNOSIS — R20.2 PARESTHESIAS: ICD-10-CM

## 2023-01-18 DIAGNOSIS — G43.019 INTRACTABLE MIGRAINE WITHOUT AURA AND WITHOUT STATUS MIGRAINOSUS: Primary | ICD-10-CM

## 2023-01-18 NOTE — TELEPHONE ENCOUNTER
Left  for pt informing her order for MRI has been sent and someone will reach out in the next few days to schedule.

## 2023-01-30 ENCOUNTER — LAB (OUTPATIENT)
Dept: LAB | Facility: HOSPITAL | Age: 28
End: 2023-01-30
Payer: COMMERCIAL

## 2023-01-30 DIAGNOSIS — R73.03 PRE-DIABETES: ICD-10-CM

## 2023-01-30 DIAGNOSIS — E88.81 INSULIN RESISTANCE: ICD-10-CM

## 2023-01-30 DIAGNOSIS — E28.2 PCOS (POLYCYSTIC OVARIAN SYNDROME): ICD-10-CM

## 2023-01-31 ENCOUNTER — HOSPITAL ENCOUNTER (OUTPATIENT)
Dept: MRI IMAGING | Facility: HOSPITAL | Age: 28
Discharge: HOME | End: 2023-01-31
Payer: COMMERCIAL

## 2023-01-31 ENCOUNTER — LAB (OUTPATIENT)
Dept: LAB | Facility: HOSPITAL | Age: 28
End: 2023-01-31
Payer: COMMERCIAL

## 2023-01-31 DIAGNOSIS — E28.2 PCOS (POLYCYSTIC OVARIAN SYNDROME): ICD-10-CM

## 2023-01-31 DIAGNOSIS — R73.03 PRE-DIABETES: ICD-10-CM

## 2023-01-31 DIAGNOSIS — R20.2 PARESTHESIAS: ICD-10-CM

## 2023-01-31 DIAGNOSIS — E88.81 INSULIN RESISTANCE: ICD-10-CM

## 2023-01-31 DIAGNOSIS — G43.019 INTRACTABLE MIGRAINE WITHOUT AURA AND WITHOUT STATUS MIGRAINOSUS: ICD-10-CM

## 2023-01-31 LAB
ALBUMIN SERPL-MCNC: 4.4 G/DL (ref 3.5–5.2)
ALBUMIN/GLOB SERPL: 1.5 G/DL
ALP SERPL-CCNC: 113 U/L (ref 39–117)
ALT SERPL W P-5'-P-CCNC: 19 U/L (ref 1–33)
ANION GAP SERPL CALCULATED.3IONS-SCNC: 11 MMOL/L (ref 5–15)
AST SERPL-CCNC: 16 U/L (ref 1–32)
BILIRUB SERPL-MCNC: 0.2 MG/DL (ref 0–1.2)
BUN SERPL-MCNC: 11 MG/DL (ref 6–20)
BUN/CREAT SERPL: 17.7 (ref 7–25)
CALCIUM SPEC-SCNC: 9.1 MG/DL (ref 8.6–10.5)
CHLORIDE SERPL-SCNC: 105 MMOL/L (ref 98–107)
CO2 SERPL-SCNC: 24 MMOL/L (ref 22–29)
CREAT SERPL-MCNC: 0.62 MG/DL (ref 0.57–1)
EGFRCR SERPLBLD CKD-EPI 2021: 125.4 ML/MIN/1.73
GLOBULIN UR ELPH-MCNC: 2.9 GM/DL
GLUCOSE SERPL-MCNC: 102 MG/DL (ref 65–99)
HBA1C MFR BLD: 6.2 % (ref 4.8–5.6)
POTASSIUM SERPL-SCNC: 3.8 MMOL/L (ref 3.5–5.2)
PROT SERPL-MCNC: 7.3 G/DL (ref 6–8.5)
SODIUM SERPL-SCNC: 140 MMOL/L (ref 136–145)
TSH SERPL DL<=0.05 MIU/L-ACNC: 5.2 UIU/ML (ref 0.27–4.2)

## 2023-01-31 PROCEDURE — 36415 COLL VENOUS BLD VENIPUNCTURE: CPT

## 2023-01-31 PROCEDURE — 80053 COMPREHEN METABOLIC PANEL: CPT

## 2023-01-31 PROCEDURE — 84443 ASSAY THYROID STIM HORMONE: CPT

## 2023-01-31 PROCEDURE — 70551 MRI BRAIN STEM W/O DYE: CPT

## 2023-01-31 PROCEDURE — 83036 HEMOGLOBIN GLYCOSYLATED A1C: CPT

## 2023-01-31 PROCEDURE — 83525 ASSAY OF INSULIN: CPT

## 2023-02-04 DIAGNOSIS — J30.2 SEASONAL ALLERGIES: ICD-10-CM

## 2023-02-06 LAB — INSULIN SERPL-ACNC: 8 UIU/ML

## 2023-02-06 RX ORDER — MONTELUKAST SODIUM 10 MG/1
10 TABLET ORAL NIGHTLY
Qty: 90 TABLET | Refills: 1 | Status: SHIPPED | OUTPATIENT
Start: 2023-02-06 | End: 2023-03-13 | Stop reason: SDUPTHER

## 2023-02-09 ENCOUNTER — TELEPHONE (OUTPATIENT)
Dept: NEUROLOGY | Facility: CLINIC | Age: 28
End: 2023-02-09
Payer: COMMERCIAL

## 2023-02-09 DIAGNOSIS — R73.03 PRE-DIABETES: ICD-10-CM

## 2023-02-09 DIAGNOSIS — F41.9 ANXIETY: ICD-10-CM

## 2023-02-09 RX ORDER — RIZATRIPTAN BENZOATE 10 MG/1
10 TABLET ORAL ONCE AS NEEDED
Qty: 10 TABLET | Refills: 3 | Status: SHIPPED | OUTPATIENT
Start: 2023-02-09 | End: 2023-03-17 | Stop reason: SDUPTHER

## 2023-02-09 RX ORDER — METFORMIN HYDROCHLORIDE 750 MG/1
750 TABLET, EXTENDED RELEASE ORAL
Qty: 30 TABLET | Refills: 0 | Status: SHIPPED | OUTPATIENT
Start: 2023-02-09 | End: 2023-03-13 | Stop reason: SDUPTHER

## 2023-02-09 RX ORDER — BUSPIRONE HYDROCHLORIDE 7.5 MG/1
7.5 TABLET ORAL DAILY
Qty: 30 TABLET | Refills: 0 | Status: SHIPPED | OUTPATIENT
Start: 2023-02-09 | End: 2023-03-13

## 2023-02-09 RX ORDER — VILAZODONE HYDROCHLORIDE 40 MG/1
40 TABLET ORAL DAILY
Qty: 30 TABLET | Refills: 0 | Status: SHIPPED | OUTPATIENT
Start: 2023-02-09 | End: 2023-03-13 | Stop reason: SDUPTHER

## 2023-02-09 NOTE — TELEPHONE ENCOUNTER
Dr. Zaragoza this patients PA request for Nurtec was denied. So I mailed them an appeal form and that was denied as well as patient only has one triptan failure documented, Sumatriptan. It seems as though this patient has not had an abortive for a while now due to insurance issues can we attempt another Triptan therapy so she has something and if she fails we can document and retry Nurtec PA?

## 2023-02-10 ENCOUNTER — TELEPHONE (OUTPATIENT)
Dept: INTERNAL MEDICINE | Facility: CLINIC | Age: 28
End: 2023-02-10
Payer: COMMERCIAL

## 2023-02-14 ENCOUNTER — OFFICE VISIT (OUTPATIENT)
Dept: ENDOCRINOLOGY | Facility: CLINIC | Age: 28
End: 2023-02-14
Payer: COMMERCIAL

## 2023-02-14 VITALS
HEART RATE: 104 BPM | SYSTOLIC BLOOD PRESSURE: 112 MMHG | HEIGHT: 63 IN | WEIGHT: 208 LBS | DIASTOLIC BLOOD PRESSURE: 74 MMHG | BODY MASS INDEX: 36.86 KG/M2 | OXYGEN SATURATION: 98 %

## 2023-02-14 DIAGNOSIS — E66.01 CLASS 2 SEVERE OBESITY DUE TO EXCESS CALORIES WITH SERIOUS COMORBIDITY AND BODY MASS INDEX (BMI) OF 36.0 TO 36.9 IN ADULT: ICD-10-CM

## 2023-02-14 DIAGNOSIS — E28.2 PCOS (POLYCYSTIC OVARIAN SYNDROME): ICD-10-CM

## 2023-02-14 DIAGNOSIS — R73.03 PREDIABETES: Primary | ICD-10-CM

## 2023-02-14 DIAGNOSIS — R79.89 ABNORMAL TSH: ICD-10-CM

## 2023-02-14 DIAGNOSIS — E55.9 VITAMIN D DEFICIENCY: ICD-10-CM

## 2023-02-14 DIAGNOSIS — R29.818 SUSPECTED SLEEP APNEA: ICD-10-CM

## 2023-02-14 PROBLEM — G43.109 MIGRAINE WITH AURA: Status: ACTIVE | Noted: 2023-02-14

## 2023-02-14 PROBLEM — E66.812 CLASS 2 SEVERE OBESITY DUE TO EXCESS CALORIES WITH SERIOUS COMORBIDITY AND BODY MASS INDEX (BMI) OF 36.0 TO 36.9 IN ADULT: Status: ACTIVE | Noted: 2023-02-14

## 2023-02-14 LAB
EXPIRATION DATE: ABNORMAL
GLUCOSE BLDC GLUCOMTR-MCNC: 190 MG/DL (ref 70–130)
Lab: ABNORMAL

## 2023-02-14 PROCEDURE — 99204 OFFICE O/P NEW MOD 45 MIN: CPT | Performed by: INTERNAL MEDICINE

## 2023-02-14 PROCEDURE — 82306 VITAMIN D 25 HYDROXY: CPT | Performed by: INTERNAL MEDICINE

## 2023-02-14 PROCEDURE — 82947 ASSAY GLUCOSE BLOOD QUANT: CPT | Performed by: INTERNAL MEDICINE

## 2023-02-14 PROCEDURE — 84439 ASSAY OF FREE THYROXINE: CPT | Performed by: INTERNAL MEDICINE

## 2023-02-14 PROCEDURE — 86376 MICROSOMAL ANTIBODY EACH: CPT | Performed by: INTERNAL MEDICINE

## 2023-02-14 PROCEDURE — 84443 ASSAY THYROID STIM HORMONE: CPT | Performed by: INTERNAL MEDICINE

## 2023-02-14 NOTE — PATIENT INSTRUCTIONS
Eliminate regular sweetened beverages.   Decrease eating out.   Decrease carbs and increase protein intake. Eat whole grains and wheat breads/pasta.     Follow [employer] program on [employer]facebook.

## 2023-02-14 NOTE — PROGRESS NOTES
Chief Complaint   Patient presents with   • Prediabetes   • Polycystic Ovary Syndrome   • Obesity        Referring Provider  Antonella Dangelo, *     HPI   Elisabeth Martinez is a 27 y.o. female had concerns including Prediabetes, Polycystic Ovary Syndrome, and Obesity.      Patient referred by PCP today for prediabetes, PCOS, difficulty with weight management.  Most recent A1c was elevated at 6.2.  Previous to that was 5.7.   Recent labs indicated that TSH was also slightly elevated.    BG is 190 this morning after iced coffee from [employer] and sausage biscuit (not the whole biscuit). Typically has a Keurig coffee at home and creamer.   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 Sleep is bad. Hasn't been screened for sleep apnea.    Past Medical History:   Diagnosis Date   • Anemia    • Anxiety    • Asthma    • Depression    • Hypothyroidism    • Kidney stone    • Migraine    • Ovarian cyst    • PCOS (polycystic ovarian syndrome)      History reviewed. No pertinent surgical history.   Family History   Problem Relation Age of Onset   • Thyroid disease Mother    • Hypertension Mother    • Seizures Mother    • Arthritis Mother    • Cancer Father    • Heart disease Maternal Grandmother    • Stroke Maternal Grandmother    • Kidney disease Maternal Grandfather    • Cancer Maternal Grandfather    • Diabetes Maternal Grandfather       Social History      Socioeconomic History   • Marital status:    Tobacco Use   • Smoking status: Never   • Smokeless tobacco: Never   Vaping Use   • Vaping Use: Never used   Substance and Sexual Activity   • Alcohol use: Yes     Comment: RARELY   • Drug use: Never   • Sexual activity: Yes     Partners: Male     Birth control/protection: Condom      No Known Allergies   Current Outpatient Medications on File Prior to Visit   Medication Sig Dispense Refill   • busPIRone (BUSPAR) 7.5 MG tablet Take 1 tablet by mouth Daily. 30 tablet 0   • cetirizine (zyrTEC) 10 MG tablet Take 1 tablet by mouth Daily. 90 tablet 1   • fluticasone (FLONASE) 50 MCG/ACT nasal spray 2 sprays into the nostril(s) as directed by provider Daily. 33.3 mL 1   • metFORMIN ER (GLUCOPHAGE-XR) 750 MG 24 hr tablet Take 1 tablet by mouth Daily With Breakfast. 30 tablet 0   • montelukast (SINGULAIR) 10 MG tablet Take 1 tablet by mouth Every Night. NEEDS APPOINTMENT FOR NEXT REFILL 90 tablet 1   • multivitamin with minerals tablet tablet Take 1 tablet by mouth Daily.     • norethindrone (MICRONOR) 0.35 MG tablet Take 1 tablet by mouth Daily. 28 tablet 12   • rizatriptan (Maxalt) 10 MG tablet Take 1 tablet by mouth 1 (One) Time As Needed for Migraine for up to 30 days. May repeat in 2 hours if needed 10 tablet 3   • topiramate (Topamax) 50 MG tablet Take 1 tablet by mouth 2 (Two) Times a Day. 60 tablet 2   • vilazodone (Viibryd) 40 MG tablet tablet Take 1 tablet by mouth Daily. 30 tablet 0     No current facility-administered medications on file prior to visit.        Review of Systems   Constitutional: Positive for fatigue and unexpected weight gain.   HENT: Negative.    Eyes: Negative.    Respiratory: Positive for shortness of breath.    Cardiovascular: Positive for leg swelling.   Gastrointestinal: Negative.    Endocrine: Negative.         Hair loss   Genitourinary: Positive for menstrual problem.   Musculoskeletal: Negative.    Skin: Negative.   "  Allergic/Immunologic: Negative.    Neurological: Positive for weakness, light-headedness, numbness and headache.   Hematological: Negative.    Psychiatric/Behavioral: Negative.         /74   Pulse 104   Ht 160 cm (63\")   Wt 94.3 kg (208 lb)   LMP 01/25/2023   SpO2 98%   BMI 36.85 kg/m²      Physical Exam    Constitutional:  well developed; well nourished  no acute distress  obese - Body mass index is 36.85 kg/m².   ENT/Thyroid: no thyromegaly  no palpable nodules   Eyes: EOM intact  Conjunctiva: clear   Respiratory:  breathing is unlabored  clear to auscultation bilaterally   Cardiovascular:  regular rate and rhythm, S1, S2 normal, no murmur, click, rub or gallop   Chest:  Not performed.   Abdomen: Not performed.   : Not performed.   Musculoskeletal: negative findings:  ROM of all joints is normal, no deformities present   Skin: dry and warm   Neuro: normal without focal findings and mental status, speech normal, alert and oriented x3   Psych: oriented to time, place and person, mood and affect are within normal limits     LABS AND IMAGING  CMP:  Lab Results   Component Value Date    BUN 11 01/31/2023    CREATININE 0.62 01/31/2023    EGFRIFNONA 92 10/30/2021    BCR 17.7 01/31/2023     01/31/2023    K 3.8 01/31/2023    CO2 24.0 01/31/2023    CALCIUM 9.1 01/31/2023    ALBUMIN 4.4 01/31/2023    BILITOT 0.2 01/31/2023    ALKPHOS 113 01/31/2023    AST 16 01/31/2023    ALT 19 01/31/2023     Lipid Panel:  Lab Results   Component Value Date    CHOL 225 (H) 09/08/2021    TRIG 258 (H) 09/08/2021    HDL 37 (L) 09/08/2021    VLDL 47 (H) 09/08/2021     (H) 09/08/2021     HbA1c:  Lab Results   Component Value Date    HGBA1C 6.20 (H) 01/31/2023    HGBA1C 5.70 (H) 08/25/2022     Glucose:  Lab Results   Component Value Date    POCGLU 190 (A) 02/14/2023     TSH:  Lab Results   Component Value Date    TSH 5.200 (H) 01/31/2023    TSH 2.870 07/26/2022    TSH 4.650 (H) 09/08/2021     Lab Results   Component " Value Date    RTAQ83OB 32.2 07/26/2022       Assessment and Plan    Diagnoses and all orders for this visit:    1. Prediabetes (Primary)   today after [employer] iced coffee and sausage biscuit.  Last A1c 6.2.  Insulin levels do not need to be monitored.  A1c should be checked every 3 to 6 months.  Dietary modifications were discussed in detail.  Eliminate all regular sweetened beverages, decrease carb intake, increase protein and healthy fats.   Exercise as able.  -     POC Glucose, Blood    2. Class 2 severe obesity due to excess calories with serious comorbidity and body mass index (BMI) of 36.0 to 36.9 in adult (HCC)  BMI up to 36.9.  Weight gain in recent years is predominantly due to high calorie diet.  She eats out frequently, drinking regular sweetened beverages daily.  Several modifications were recommended today.  PCOS, insulin resistance, mildly abnormal TSH will not cause weight gain  alone if she maintains a calorie deficit.    3. PCOS (polycystic ovarian syndrome)  History of high testosterone levels.  She is on metformin and progesterone only OCP (history of migraine with aura).  Weight loss is primary treatment for PCOS.  She can continue metformin.    4. Abnormal TSH  Mildly elevated TSH on recent labs.  Recheck today.  -     T4, Free; Future  -     TSH; Future  -     Thyroid Peroxidase Antibody; Future    5. Suspected sleep apnea  Referral placed. Untreated YARELIS will contribute to difficulty with weight loss, hypertension, headaches, increased insulin resistance, increased risk of strokes and heart attacks.    6. Vitamin D deficiency  Check level today. In the past required prescription vitamin D.      Return in about 4 months (around 6/14/2023) for next scheduled follow up. The patient was instructed to contact the clinic with any interval questions or concerns.    Luisa Allison, DO   Endocrinologist    Please note that portions of this note were completed with a voice recognition  program.

## 2023-02-15 LAB
25(OH)D3 SERPL-MCNC: 35.1 NG/ML (ref 30–100)
T4 FREE SERPL-MCNC: 1.07 NG/DL (ref 0.93–1.7)
TSH SERPL DL<=0.05 MIU/L-ACNC: 1.84 UIU/ML (ref 0.27–4.2)

## 2023-02-16 LAB — THYROPEROXIDASE AB SERPL-ACNC: <9 IU/ML (ref 0–34)

## 2023-02-23 ENCOUNTER — OFFICE VISIT (OUTPATIENT)
Dept: OBSTETRICS AND GYNECOLOGY | Facility: CLINIC | Age: 28
End: 2023-02-23
Payer: COMMERCIAL

## 2023-02-23 VITALS
SYSTOLIC BLOOD PRESSURE: 110 MMHG | BODY MASS INDEX: 36.5 KG/M2 | DIASTOLIC BLOOD PRESSURE: 80 MMHG | WEIGHT: 206 LBS | HEIGHT: 63 IN

## 2023-02-23 DIAGNOSIS — N92.3 INTERMENSTRUAL BLEEDING: ICD-10-CM

## 2023-02-23 DIAGNOSIS — N76.0 VULVOVAGINITIS: ICD-10-CM

## 2023-02-23 DIAGNOSIS — R35.0 URINARY FREQUENCY: ICD-10-CM

## 2023-02-23 DIAGNOSIS — Z30.41 ENCOUNTER FOR SURVEILLANCE OF CONTRACEPTIVE PILLS: Primary | ICD-10-CM

## 2023-02-23 LAB
BILIRUB UR QL STRIP: NEGATIVE
CLARITY UR: CLEAR
COLOR UR: YELLOW
GLUCOSE UR STRIP-MCNC: NEGATIVE MG/DL
HGB UR QL STRIP.AUTO: NEGATIVE
KETONES UR QL STRIP: NEGATIVE
LEUKOCYTE ESTERASE UR QL STRIP.AUTO: NEGATIVE
NITRITE UR QL STRIP: NEGATIVE
PH UR STRIP.AUTO: 7.5 [PH] (ref 5–8)
PROT UR QL STRIP: NEGATIVE
SP GR UR STRIP: 1.01 (ref 1–1.03)
UROBILINOGEN UR QL STRIP: NORMAL

## 2023-02-23 PROCEDURE — 81003 URINALYSIS AUTO W/O SCOPE: CPT | Performed by: NURSE PRACTITIONER

## 2023-02-23 PROCEDURE — 99213 OFFICE O/P EST LOW 20 MIN: CPT | Performed by: NURSE PRACTITIONER

## 2023-02-23 NOTE — PROGRESS NOTES
Chief Complaint  Elisabeth Martinez is a 27 y.o.  female presenting for Follow-up (3 month follow-up after starting Micronor.  /Patient is concerned she may have BV or a UTI.  )    History of Present Illness  Elisabeth is a very pleasant 28yo nulligravid woman.  She is here for 3 mo FU of change in BC methods.  She has failed on IUD & Nexplanon.  She has migraines with aura.  She has tolerated POPs well, but having some BTB.  It has improved some in the last pack.  Her migraine med was changed at ~ same time as BC method.  She is having more migraines than before the change.  Has appt with Neurologist in May.  (She does take NSAIDs fairly commonly, so will not change to Slynd.)      She c/o intermittent vulvovaginitis sx, and maybe a fissure sometimes.  Sometimes burning when the urine runs over the area.  No new sexual partners.    Otherwise, ROS negative.      The following portions of the patient's history were reviewed and updated as appropriate: allergies, current medications, past family history, past medical history, past social history, past surgical history and problem list.    No Known Allergies      Current Outpatient Medications:   •  busPIRone (BUSPAR) 7.5 MG tablet, Take 1 tablet by mouth Daily., Disp: 30 tablet, Rfl: 0  •  cetirizine (zyrTEC) 10 MG tablet, Take 1 tablet by mouth Daily., Disp: 90 tablet, Rfl: 1  •  fluticasone (FLONASE) 50 MCG/ACT nasal spray, 2 sprays into the nostril(s) as directed by provider Daily., Disp: 33.3 mL, Rfl: 1  •  metFORMIN ER (GLUCOPHAGE-XR) 750 MG 24 hr tablet, Take 1 tablet by mouth Daily With Breakfast., Disp: 30 tablet, Rfl: 0  •  montelukast (SINGULAIR) 10 MG tablet, Take 1 tablet by mouth Every Night. NEEDS APPOINTMENT FOR NEXT REFILL, Disp: 90 tablet, Rfl: 1  •  multivitamin with minerals tablet tablet, Take 1 tablet by mouth Daily., Disp: , Rfl:   •  norethindrone (MICRONOR) 0.35 MG tablet, Take 1 tablet by mouth Daily., Disp: 28 tablet, Rfl: 12  •   "rizatriptan (Maxalt) 10 MG tablet, Take 1 tablet by mouth 1 (One) Time As Needed for Migraine for up to 30 days. May repeat in 2 hours if needed, Disp: 10 tablet, Rfl: 3  •  topiramate (Topamax) 50 MG tablet, Take 1 tablet by mouth 2 (Two) Times a Day., Disp: 60 tablet, Rfl: 2  •  vilazodone (Viibryd) 40 MG tablet tablet, Take 1 tablet by mouth Daily., Disp: 30 tablet, Rfl: 0    Past Medical History:   Diagnosis Date   • Anemia    • Anxiety    • Asthma    • Depression    • Hypothyroidism    • Kidney stone    • Migraine    • Ovarian cyst    • PCOS (polycystic ovarian syndrome)         History reviewed. No pertinent surgical history.    Objective  /80   Ht 160 cm (63\")   Wt 93.4 kg (206 lb)   LMP 01/25/2023   Breastfeeding No   BMI 36.49 kg/m²     Physical Exam  Vitals and nursing note reviewed. Exam conducted with a chaperone present.   Constitutional:       Appearance: Normal appearance.   Pulmonary:      Effort: Pulmonary effort is normal. No respiratory distress.   Abdominal:      General: Bowel sounds are normal.      Palpations: Abdomen is soft. There is no mass.      Tenderness: There is no abdominal tenderness.   Genitourinary:     General: Normal vulva.      Labia:         Right: No rash, tenderness or lesion.         Left: No rash, tenderness or lesion.       Vagina: Bleeding present. No erythema.      Cervix: No cervical motion tenderness, discharge, lesion or erythema.      Uterus: Normal. Not enlarged and not tender.       Adnexa: Right adnexa normal and left adnexa normal.        Right: No mass or tenderness.          Left: No mass or tenderness.        Rectum: Normal.      Comments: Scant dk blood in the vaginal vault.  OneSwab taken.  Anus appears wnl.  (No rectal exam performed.)  Skin:     General: Skin is warm and dry.   Neurological:      Mental Status: She is alert and oriented to person, place, and time.   Psychiatric:         Mood and Affect: Mood normal.         Behavior: Behavior " normal.         Assessment/Plan   Diagnoses and all orders for this visit:    1. Encounter for surveillance of contraceptive pills (Primary)    2. Intermenstrual bleeding    3. Vulvovaginitis    Continue the current POPs for now.      Procedures            Return in about 3 months (around 5/23/2023) for Recheck.    Antonella Dangelo, APRN  02/23/2023

## 2023-02-27 RX ORDER — FLUCONAZOLE 150 MG/1
150 TABLET ORAL DAILY
Qty: 2 TABLET | Refills: 0 | Status: SHIPPED | OUTPATIENT
Start: 2023-02-27 | End: 2023-02-27 | Stop reason: SDUPTHER

## 2023-02-27 RX ORDER — FLUCONAZOLE 150 MG/1
150 TABLET ORAL DAILY
Qty: 2 TABLET | Refills: 0 | Status: SHIPPED | OUTPATIENT
Start: 2023-02-27 | End: 2023-03-13

## 2023-02-27 RX ORDER — NITROFURANTOIN 25; 75 MG/1; MG/1
100 CAPSULE ORAL 2 TIMES DAILY
Qty: 14 CAPSULE | Refills: 0 | Status: SHIPPED | OUTPATIENT
Start: 2023-02-27 | End: 2023-03-13

## 2023-02-27 RX ORDER — NITROFURANTOIN 25; 75 MG/1; MG/1
100 CAPSULE ORAL 2 TIMES DAILY
Qty: 14 CAPSULE | Refills: 0 | Status: SHIPPED | OUTPATIENT
Start: 2023-02-27 | End: 2023-02-27 | Stop reason: SDUPTHER

## 2023-03-13 ENCOUNTER — OFFICE VISIT (OUTPATIENT)
Dept: INTERNAL MEDICINE | Facility: CLINIC | Age: 28
End: 2023-03-13
Payer: COMMERCIAL

## 2023-03-13 VITALS
BODY MASS INDEX: 36.39 KG/M2 | SYSTOLIC BLOOD PRESSURE: 132 MMHG | HEART RATE: 120 BPM | OXYGEN SATURATION: 97 % | DIASTOLIC BLOOD PRESSURE: 80 MMHG | TEMPERATURE: 97.7 F | WEIGHT: 205.4 LBS | RESPIRATION RATE: 16 BRPM | HEIGHT: 63 IN

## 2023-03-13 DIAGNOSIS — Z13.6 ENCOUNTER FOR LIPID SCREENING FOR CARDIOVASCULAR DISEASE: ICD-10-CM

## 2023-03-13 DIAGNOSIS — F32.1 MODERATE MAJOR DEPRESSION: ICD-10-CM

## 2023-03-13 DIAGNOSIS — F41.9 ANXIETY: ICD-10-CM

## 2023-03-13 DIAGNOSIS — J45.20 MILD INTERMITTENT ASTHMA WITHOUT COMPLICATION: ICD-10-CM

## 2023-03-13 DIAGNOSIS — R73.03 PRE-DIABETES: ICD-10-CM

## 2023-03-13 DIAGNOSIS — Z00.00 ENCOUNTER FOR WELLNESS EXAMINATION: Primary | ICD-10-CM

## 2023-03-13 DIAGNOSIS — Z13.220 ENCOUNTER FOR LIPID SCREENING FOR CARDIOVASCULAR DISEASE: ICD-10-CM

## 2023-03-13 DIAGNOSIS — J30.2 SEASONAL ALLERGIES: ICD-10-CM

## 2023-03-13 DIAGNOSIS — N92.1 MENORRHAGIA WITH IRREGULAR CYCLE: ICD-10-CM

## 2023-03-13 PROCEDURE — 99395 PREV VISIT EST AGE 18-39: CPT | Performed by: NURSE PRACTITIONER

## 2023-03-13 RX ORDER — MULTIVIT WITH MINERALS/LUTEIN
250 TABLET ORAL DAILY
COMMUNITY

## 2023-03-13 RX ORDER — HYDROXYZINE HYDROCHLORIDE 10 MG/1
10 TABLET, FILM COATED ORAL EVERY 8 HOURS PRN
Qty: 90 TABLET | Refills: 0 | Status: SHIPPED | OUTPATIENT
Start: 2023-03-13

## 2023-03-13 RX ORDER — FERROUS SULFATE TAB EC 324 MG (65 MG FE EQUIVALENT) 324 (65 FE) MG
324 TABLET DELAYED RESPONSE ORAL
COMMUNITY

## 2023-03-13 RX ORDER — VILAZODONE HYDROCHLORIDE 40 MG/1
40 TABLET ORAL DAILY
Qty: 90 TABLET | Refills: 1 | Status: SHIPPED | OUTPATIENT
Start: 2023-03-13

## 2023-03-13 RX ORDER — METFORMIN HYDROCHLORIDE 750 MG/1
750 TABLET, EXTENDED RELEASE ORAL
Qty: 90 TABLET | Refills: 1 | Status: SHIPPED | OUTPATIENT
Start: 2023-03-13

## 2023-03-13 RX ORDER — FLUTICASONE PROPIONATE 50 MCG
2 SPRAY, SUSPENSION (ML) NASAL DAILY
Qty: 33.3 ML | Refills: 5 | Status: SHIPPED | OUTPATIENT
Start: 2023-03-13

## 2023-03-13 RX ORDER — MONTELUKAST SODIUM 10 MG/1
10 TABLET ORAL NIGHTLY
Qty: 90 TABLET | Refills: 1 | Status: SHIPPED | OUTPATIENT
Start: 2023-03-13

## 2023-03-13 RX ORDER — MULTIVIT-MIN/IRON/FOLIC ACID/K 18-600-40
4000 CAPSULE ORAL
COMMUNITY

## 2023-03-13 RX ORDER — ALBUTEROL SULFATE 90 UG/1
2 AEROSOL, METERED RESPIRATORY (INHALATION) EVERY 4 HOURS PRN
Qty: 18 G | Refills: 1 | Status: SHIPPED | OUTPATIENT
Start: 2023-03-13

## 2023-03-13 NOTE — PROGRESS NOTES
Female Physical Note      Patient Name: Elisabeth Martinez  : 1995   MRN: [id number]     Chief Complaint:    Chief Complaint   Patient presents with   • Annual Exam       History of Present Illness: Elisabeth Martinez is a 27 y.o. female who is here today for their annual health maintenance and physical.   Are you currently seeing any other doctors or specialists? Endocrinology-Luisa Allison, Gynecology-Antonella East   Are you currently taking any OTC medications or herbal medications?     Sleep: She was referred to sleep study by endocrinology; 6 hours/ night      Regular dental visits: Yes, every 6 months  Regular eye exams: advised; does not wear glasses; is not up to date; last exam       Asthma  She has ran out of her rescue inhaler. Since the last office visit, the patient has not sought emergency care. The patient  denies chronic dyspnea, wheezing or productive sputum.  No night-time awakenings.  She takes montelukast and cetirizine for allergies also.     Depression/anxiety  Viibryd has improved her depression and anxiety. The patient is currently on Viibryd and she noticed that the medication states it can interact with BuSpar and rizatriptan which she is on for migraines. Also, it said do not take with Midol or ibuprofen. She is taking BuSpar 7.5 MG 1 time daily. She will experience diarrhea, and does not take her Viibryd with food.     She had an MRI obtained for increased migraines. She has also been experiencing twitching in her hands. She sees her neurologist in 2023. Her neurologist did not mention her taking Viibryd when he prescribed her rizatriptan.     She denies angina or dyspnea.     Subjective     Review of System: Review of Systems   Genitourinary: Positive for vaginal bleeding (irregular).   Neurological: Positive for tremors.   Psychiatric/Behavioral: Positive for sleep disturbance.      I have reviewed the ROS documented by my clinical staff, updated appropriately and I  agree. PATRIC Su    Past Medical History:   Past Medical History:   Diagnosis Date   • Anemia    • Anxiety    • Asthma    • Depression    • Hypothyroidism    • Kidney stone    • Migraine    • Ovarian cyst    • PCOS (polycystic ovarian syndrome)        Past Surgical History: History reviewed. No pertinent surgical history.    Family History:   Family History   Problem Relation Age of Onset   • Thyroid disease Mother    • Hypertension Mother    • Seizures Mother    • Arthritis Mother    • Cancer Father    • Heart disease Maternal Grandmother    • Stroke Maternal Grandmother    • Kidney disease Maternal Grandfather    • Cancer Maternal Grandfather    • Diabetes Maternal Grandfather        Social History:   Social History     Socioeconomic History   • Marital status:    Tobacco Use   • Smoking status: Never   • Smokeless tobacco: Never   Vaping Use   • Vaping Use: Never used   Substance and Sexual Activity   • Alcohol use: Yes     Comment: RARELY   • Drug use: Never   • Sexual activity: Yes     Partners: Male     Birth control/protection: Condom       Medications:     Current Outpatient Medications:   •  cetirizine (zyrTEC) 10 MG tablet, Take 1 tablet by mouth Daily., Disp: 90 tablet, Rfl: 1  •  ferrous sulfate 324 (65 Fe) MG tablet delayed-release EC tablet, Take 1 tablet by mouth Daily With Breakfast., Disp: , Rfl:   •  fluticasone (FLONASE) 50 MCG/ACT nasal spray, 2 sprays into the nostril(s) as directed by provider Daily., Disp: 33.3 mL, Rfl: 5  •  metFORMIN ER (GLUCOPHAGE-XR) 750 MG 24 hr tablet, Take 1 tablet by mouth Daily With Breakfast., Disp: 90 tablet, Rfl: 1  •  montelukast (SINGULAIR) 10 MG tablet, Take 1 tablet by mouth Every Night. NEEDS APPOINTMENT FOR NEXT REFILL, Disp: 90 tablet, Rfl: 1  •  multivitamin with minerals tablet tablet, Take 1 tablet by mouth Daily., Disp: , Rfl:   •  norethindrone (MICRONOR) 0.35 MG tablet, Take 1 tablet by mouth Daily., Disp: 28 tablet, Rfl: 12  •   "rizatriptan (Maxalt) 10 MG tablet, Take 1 tablet by mouth 1 (One) Time As Needed for Migraine for up to 30 days. May repeat in 2 hours if needed, Disp: 10 tablet, Rfl: 3  •  topiramate (Topamax) 50 MG tablet, Take 1 tablet by mouth 2 (Two) Times a Day., Disp: 60 tablet, Rfl: 2  •  vilazodone (Viibryd) 40 MG tablet tablet, Take 1 tablet by mouth Daily., Disp: 90 tablet, Rfl: 1  •  vitamin C (ASCORBIC ACID) 250 MG tablet, Take 1 tablet by mouth Daily., Disp: , Rfl:   •  Vitamin D, Cholecalciferol, 50 MCG (2000 UT) capsule, Take 2 capsules by mouth., Disp: , Rfl:   •  albuterol sulfate  (90 Base) MCG/ACT inhaler, Inhale 2 puffs Every 4 (Four) Hours As Needed for Wheezing., Disp: 18 g, Rfl: 1  •  hydrOXYzine (ATARAX) 10 MG tablet, Take 1 tablet by mouth Every 8 (Eight) Hours As Needed for Itching., Disp: 90 tablet, Rfl: 0    Allergies:   No Known Allergies    Immunizations:  Td/Tdap(Booster Q 10 yrs):  uptodate  Flu (Yearly):  advised  Colorectal Screening:     Last Completed Colonoscopy     This patient has no relevant Health Maintenance data.        Pap:    Last Completed Pap Smear          PAP SMEAR (Every 3 Years) Next due on 1/1/2024 01/01/2021  Done              Mammogram:    Last Completed Mammogram     This patient has no relevant Health Maintenance data.           Diet/Physical activity: Cut out soda, drinking more water; wheat bread; making small changes;  exercises 2-3 days a week      Depression: PHQ-2/9 Depression Screening  Little interest or pleasure in doing things?     Feeling down, depressed, or hopeless?     PHQ-2 Total Score     PHQ-9 Total Score 0       Objective     Physical Exam:  Vital Signs:   Vitals:    03/13/23 1433   BP: 132/80   BP Location: Right arm   Patient Position: Sitting   Cuff Size: Adult   Pulse: 120   Resp: 16   Temp: 97.7 °F (36.5 °C)   TempSrc: Infrared   SpO2: 97%   Weight: 93.2 kg (205 lb 6.4 oz)   Height: 159.4 cm (62.75\")   PainSc: 0-No pain     Body mass index is " 36.68 kg/m². Class 2 Severe Obesity (BMI >=35 and <=39.9). Obesity-related health conditions include the following: none. Obesity is unchanged. BMI is is above average; BMI management plan is completed. Information provided on after visit summary.      Physical Exam  Vitals and nursing note reviewed.   Constitutional:       General: She is not in acute distress.     Appearance: Normal appearance. She is not ill-appearing.   HENT:      Head: Normocephalic.      Right Ear: Tympanic membrane, ear canal and external ear normal. There is no impacted cerumen.      Left Ear: Tympanic membrane, ear canal and external ear normal. There is no impacted cerumen.      Nose: No septal deviation, nasal tenderness or rhinorrhea.      Mouth/Throat:      Mouth: Mucous membranes are moist.      Pharynx: Oropharynx is clear. No oropharyngeal exudate or posterior oropharyngeal erythema.   Eyes:      General:         Right eye: No discharge.         Left eye: No discharge.      Extraocular Movements: Extraocular movements intact.      Conjunctiva/sclera: Conjunctivae normal.      Pupils: Pupils are equal, round, and reactive to light.   Neck:      Thyroid: No thyromegaly.      Vascular: No carotid bruit.   Cardiovascular:      Rate and Rhythm: Normal rate and regular rhythm.      Pulses: Normal pulses.      Heart sounds: Normal heart sounds. No murmur heard.    No gallop.   Pulmonary:      Effort: Pulmonary effort is normal.      Breath sounds: Normal breath sounds. No wheezing, rhonchi or rales.   Abdominal:      General: Bowel sounds are normal.      Palpations: Abdomen is soft. There is no mass.      Tenderness: There is no abdominal tenderness. There is no right CVA tenderness, left CVA tenderness or rebound.   Genitourinary:     Comments: deferred  Musculoskeletal:         General: No swelling or tenderness. Normal range of motion.      Cervical back: Normal range of motion.      Right lower leg: No edema.      Left lower leg: No  edema.   Lymphadenopathy:      Cervical: No cervical adenopathy.   Skin:     General: Skin is warm and dry.      Capillary Refill: Capillary refill takes less than 2 seconds.      Findings: No erythema or rash.   Neurological:      General: No focal deficit present.      Mental Status: She is alert and oriented to person, place, and time.      Motor: No weakness.   Psychiatric:         Mood and Affect: Mood normal.         Behavior: Behavior is cooperative.         Thought Content: Thought content normal.         Cognition and Memory: She does not exhibit impaired recent memory.         Judgment: Judgment normal.         Procedures    Assessment / Plan      Assessment/Plan:   Diagnoses and all orders for this visit:    1. Encounter for wellness examination (Primary)  -     CBC w AUTO Differential; Future    2. Anxiety  -     hydrOXYzine (ATARAX) 10 MG tablet; Take 1 tablet by mouth Every 8 (Eight) Hours As Needed for Itching.  Dispense: 90 tablet; Refill: 0    3. Seasonal allergies  -     fluticasone (FLONASE) 50 MCG/ACT nasal spray; 2 sprays into the nostril(s) as directed by provider Daily.  Dispense: 33.3 mL; Refill: 5  -     montelukast (SINGULAIR) 10 MG tablet; Take 1 tablet by mouth Every Night. NEEDS APPOINTMENT FOR NEXT REFILL  Dispense: 90 tablet; Refill: 1    4. Pre-diabetes  -     metFORMIN ER (GLUCOPHAGE-XR) 750 MG 24 hr tablet; Take 1 tablet by mouth Daily With Breakfast.  Dispense: 90 tablet; Refill: 1    5. Moderate major depression (HCC)  -     vilazodone (Viibryd) 40 MG tablet tablet; Take 1 tablet by mouth Daily.  Dispense: 90 tablet; Refill: 1    6. Mild intermittent asthma without complication  -     montelukast (SINGULAIR) 10 MG tablet; Take 1 tablet by mouth Every Night. NEEDS APPOINTMENT FOR NEXT REFILL  Dispense: 90 tablet; Refill: 1  -     albuterol sulfate  (90 Base) MCG/ACT inhaler; Inhale 2 puffs Every 4 (Four) Hours As Needed for Wheezing.  Dispense: 18 g; Refill: 1    7.  Encounter for lipid screening for cardiovascular disease  -     Lipid panel; Future    8. Menorrhagia with irregular cycle         1. Anxiety  - Trial of hydroxyzine for anxiety was prescribed and she was advised to discontinue buspirone due to concern for serotonin syndrome. She is going to discuss changing rizatriptan with her neurologist.     2. Annual physical   - Patient will return fasting for CBC and lipid panel.     3.  Prediabetes  -She will continue metformin  mg daily    4.  Depression  - Continue Viibryd 40 mg daily.  I discussed taking it with food.    5.  Asthma  -Symptoms controlled.  Continue montelukast and albuterol as needed.    6. Menorrhagia  She will follow-up with gynecology.         Follow Up:   Return in about 6 months (around 9/13/2023) for Recheck.    Healthcare Maintenance:   Counseling provided on   Health Maintenance, Female  Adopting a healthy lifestyle and getting preventive care can go a long way to promote health and wellness. Talk with your health care provider about what schedule of regular examinations is right for you. This is a good chance for you to check in with your provider about disease prevention and staying healthy.  In between checkups, there are plenty of things you can do on your own. Experts have done a lot of research about which lifestyle changes and preventive measures are most likely to keep you healthy. Ask your health care provider for more information.  Weight and diet  Eat a healthy diet  · Be sure to include plenty of vegetables, fruits, low-fat dairy products, and lean protein.  · Do not eat a lot of foods high in solid fats, added sugars, or salt.  · Get regular exercise. This is one of the most important things you can do for your health.  ? Most adults should exercise for at least 150 minutes each week. The exercise should increase your heart rate and make you sweat (moderate-intensity exercise).  ? Most adults should also do strengthening  exercises at least twice a week. This is in addition to the moderate-intensity exercise.     Maintain a healthy weight  · Body mass index (BMI) is a measurement that can be used to identify possible weight problems. It estimates body fat based on height and weight. Your health care provider can help determine your BMI and help you achieve or maintain a healthy weight.  · For females 20 years of age and older:  ? A BMI below 18.5 is considered underweight.  ? A BMI of 18.5 to 24.9 is normal.  ? A BMI of 25 to 29.9 is considered overweight.  ? A BMI of 30 and above is considered obese.     Watch levels of cholesterol and blood lipids  · You should start having your blood tested for lipids and cholesterol at 20 years of age, then have this test every 5 years.  · You may need to have your cholesterol levels checked more often if:  ? Your lipid or cholesterol levels are high.  ? You are older than 50 years of age.  ? You are at high risk for heart disease.     Cancer screening  Lung Cancer  · Lung cancer screening is recommended for adults 55-80 years old who are at high risk for lung cancer because of a history of smoking.  · A yearly low-dose CT scan of the lungs is recommended for people who:  ? Currently smoke.  ? Have quit within the past 15 years.  ? Have at least a 30-pack-year history of smoking. A pack year is smoking an average of one pack of cigarettes a day for 1 year.  · Yearly screening should continue until it has been 15 years since you quit.  · Yearly screening should stop if you develop a health problem that would prevent you from having lung cancer treatment.     Breast Cancer  · Practice breast self-awareness. This means understanding how your breasts normally appear and feel.  · It also means doing regular breast self-exams. Let your health care provider know about any changes, no matter how small.  · If you are in your 20s or 30s, you should have a clinical breast exam (CBE) by a health care  provider every 1-3 years as part of a regular health exam.  · If you are 40 or older, have a CBE every year. Also consider having a breast X-ray (mammogram) every year.  · If you have a family history of breast cancer, talk to your health care provider about genetic screening.  · If you are at high risk for breast cancer, talk to your health care provider about having an MRI and a mammogram every year.  · Breast cancer gene (BRCA) assessment is recommended for women who have family members with BRCA-related cancers. BRCA-related cancers include:  ? Breast.  ? Ovarian.  ? Tubal.  ? Peritoneal cancers.  · Results of the assessment will determine the need for genetic counseling and BRCA1 and BRCA2 testing.     Cervical Cancer  Your health care provider may recommend that you be screened regularly for cancer of the pelvic organs (ovaries, uterus, and vagina). This screening involves a pelvic examination, including checking for microscopic changes to the surface of your cervix (Pap test). You may be encouraged to have this screening done every 3 years, beginning at age 21.  · For women ages 30-65, health care providers may recommend pelvic exams and Pap testing every 3 years, or they may recommend the Pap and pelvic exam, combined with testing for human papilloma virus (HPV), every 5 years. Some types of HPV increase your risk of cervical cancer. Testing for HPV may also be done on women of any age with unclear Pap test results.  · Other health care providers may not recommend any screening for nonpregnant women who are considered low risk for pelvic cancer and who do not have symptoms. Ask your health care provider if a screening pelvic exam is right for you.  · If you have had past treatment for cervical cancer or a condition that could lead to cancer, you need Pap tests and screening for cancer for at least 20 years after your treatment. If Pap tests have been discontinued, your risk factors (such as having a new  sexual partner) need to be reassessed to determine if screening should resume. Some women have medical problems that increase the chance of getting cervical cancer. In these cases, your health care provider may recommend more frequent screening and Pap tests.     Colorectal Cancer  · This type of cancer can be detected and often prevented.  · Routine colorectal cancer screening usually begins at 50 years of age and continues through 75 years of age.  · Your health care provider may recommend screening at an earlier age if you have risk factors for colon cancer.  · Your health care provider may also recommend using home test kits to check for hidden blood in the stool.  · A small camera at the end of a tube can be used to examine your colon directly (sigmoidoscopy or colonoscopy). This is done to check for the earliest forms of colorectal cancer.  · Routine screening usually begins at age 50.  · Direct examination of the colon should be repeated every 5-10 years through 75 years of age. However, you may need to be screened more often if early forms of precancerous polyps or small growths are found.     Skin Cancer  · Check your skin from head to toe regularly.  · Tell your health care provider about any new moles or changes in moles, especially if there is a change in a mole's shape or color.  · Also tell your health care provider if you have a mole that is larger than the size of a pencil eraser.  · Always use sunscreen. Apply sunscreen liberally and repeatedly throughout the day.  · Protect yourself by wearing long sleeves, pants, a wide-brimmed hat, and sunglasses whenever you are outside.     Heart disease, diabetes, and high blood pressure  · High blood pressure causes heart disease and increases the risk of stroke. High blood pressure is more likely to develop in:  ? People who have blood pressure in the high end of the normal range (130-139/85-89 mm Hg).  ? People who are overweight or obese.  ? People who are  .  · If you are 18-39 years of age, have your blood pressure checked every 3-5 years. If you are 40 years of age or older, have your blood pressure checked every year. You should have your blood pressure measured twice--once when you are at a hospital or clinic, and once when you are not at a hospital or clinic. Record the average of the two measurements. To check your blood pressure when you are not at a hospital or clinic, you can use:  ? An automated blood pressure machine at a pharmacy.  ? A home blood pressure monitor.  · If you are between 55 years and 79 years old, ask your health care provider if you should take aspirin to prevent strokes.  · Have regular diabetes screenings. This involves taking a blood sample to check your fasting blood sugar level.  ? If you are at a normal weight and have a low risk for diabetes, have this test once every three years after 45 years of age.  ? If you are overweight and have a high risk for diabetes, consider being tested at a younger age or more often.  Preventing infection  Hepatitis B  · If you have a higher risk for hepatitis B, you should be screened for this virus. You are considered at high risk for hepatitis B if:  ? You were born in a country where hepatitis B is common. Ask your health care provider which countries are considered high risk.  ? Your parents were born in a high-risk country, and you have not been immunized against hepatitis B (hepatitis B vaccine).  ? You have HIV or AIDS.  ? You use needles to inject street drugs.  ? You live with someone who has hepatitis B.  ? You have had sex with someone who has hepatitis B.  ? You get hemodialysis treatment.  ? You take certain medicines for conditions, including cancer, organ transplantation, and autoimmune conditions.     Hepatitis C  · Blood testing is recommended for:  ? Everyone born from 1945 through 1965.  ? Anyone with known risk factors for hepatitis C.     Sexually transmitted  infections (STIs)  · You should be screened for sexually transmitted infections (STIs) including gonorrhea and chlamydia if:  ? You are sexually active and are younger than 24 years of age.  ? You are older than 24 years of age and your health care provider tells you that you are at risk for this type of infection.  ? Your sexual activity has changed since you were last screened and you are at an increased risk for chlamydia or gonorrhea. Ask your health care provider if you are at risk.  · If you do not have HIV, but are at risk, it may be recommended that you take a prescription medicine daily to prevent HIV infection. This is called pre-exposure prophylaxis (PrEP). You are considered at risk if:  ? You are sexually active and do not regularly use condoms or know the HIV status of your partner(s).  ? You take drugs by injection.  ? You are sexually active with a partner who has HIV.     Talk with your health care provider about whether you are at high risk of being infected with HIV. If you choose to begin PrEP, you should first be tested for HIV. You should then be tested every 3 months for as long as you are taking PrEP.  Pregnancy  · If you are premenopausal and you may become pregnant, ask your health care provider about preconception counseling.  · If you may become pregnant, take 400 to 800 micrograms (mcg) of folic acid every day.  · If you want to prevent pregnancy, talk to your health care provider about birth control (contraception).  Osteoporosis and menopause  · Osteoporosis is a disease in which the bones lose minerals and strength with aging. This can result in serious bone fractures. Your risk for osteoporosis can be identified using a bone density scan.  · If you are 65 years of age or older, or if you are at risk for osteoporosis and fractures, ask your health care provider if you should be screened.  · Ask your health care provider whether you should take a calcium or vitamin D supplement to  lower your risk for osteoporosis.  · Menopause may have certain physical symptoms and risks.  · Hormone replacement therapy may reduce some of these symptoms and risks.  Talk to your health care provider about whether hormone replacement therapy is right for you.  Follow these instructions at home:  · Schedule regular health, dental, and eye exams.  · Stay current with your immunizations.  · Do not use any tobacco products including cigarettes, chewing tobacco, or electronic cigarettes.  · If you are pregnant, do not drink alcohol.  · If you are breastfeeding, limit how much and how often you drink alcohol.  · Limit alcohol intake to no more than 1 drink per day for nonpregnant women. One drink equals 12 ounces of beer, 5 ounces of wine, or 1½ ounces of hard liquor.  · Do not use street drugs.  · Do not share needles.  · Ask your health care provider for help if you need support or information about quitting drugs.  · Tell your health care provider if you often feel depressed.  · Tell your health care provider if you have ever been abused or do not feel safe at home.  This information is not intended to replace advice given to you by your health care provider. Make sure you discuss any questions you have with your health care provider.  Document Released: 07/02/2012 Document Revised: 05/25/2017 Document Reviewed: 09/20/2016  [employer] Patient Education © 2018 [employer]  Elisabeth Martinez voices understanding and acceptance of this advice and will call back with any further questions or concerns. AVS with preventive healthcare tips printed for patient.     PATRIC Su  [facility] [place] [facility]     Transcribed from ambient dictation for PATRIC Su by Mini Owusu, [employer] .  03/13/23   17:19 EDT    Patient or patient representative verbalized consent to the visit recording.  I have personally performed the services described in this document as  transcribed by the above individual, and it is both accurate and complete.

## 2023-03-13 NOTE — PATIENT INSTRUCTIONS
MyPlate from USDA  MyPlate is an outline of a general healthy diet based on the Dietary Guidelines for Americans, 0528-5074, from the U.S. Department of Agriculture (USDA). It sets guidelines for how much food you should eat from each food group based on your age, sex, and level of physical activity.  What are tips for following MyPlate?  To follow MyPlate recommendations:  Eat a wide variety of fruits and vegetables, grains, and protein foods.  Serve smaller portions and eat less food throughout the day.  Limit portion sizes to avoid overeating.  Enjoy your food.  Get at least 150 minutes of exercise every week. This is about 30 minutes each day, 5 or more days per week.  It can be difficult to have every meal look like MyPlate. Think about MyPlate as eating guidelines for an entire day, rather than each individual meal.  Fruits and vegetables  Make one half of your plate fruits and vegetables.  Eat many different colors of fruits and vegetables each day.  For a 2,000-calorie daily food plan, eat:  2½ cups of vegetables every day.  2 cups of fruit every day.  1 cup is equal to:  1 cup raw or cooked vegetables.  1 cup raw fruit.  1 medium-sized orange, apple, or banana.  1 cup 100% fruit or vegetable juice.  2 cups raw leafy greens, such as lettuce, spinach, or kale.  ½ cup dried fruit.  Grains  One fourth of your plate should be grains.  Make at least half of the grains you eat each day whole grains.  For a 2,000-calorie daily food plan, eat 6 oz of grains every day.  1 oz is equal to:  1 slice bread.  1 cup cereal.  ½ cup cooked rice, cereal, or pasta.  Protein  One fourth of your plate should be protein.  Eat a wide variety of protein foods, including meat, poultry, fish, eggs, beans, nuts, and tofu.  For a 2,000-calorie daily food plan, eat 5½ oz of protein every day.  1 oz is equal to:  1 oz meat, poultry, or fish.  ¼ cup cooked beans.  1 egg.  ½ oz nuts or seeds.  1 Tbsp peanut butter.  Dairy  Drink fat-free  or low-fat (1%) milk.  Eat or drink dairy as a side to meals.  For a 2,000-calorie daily food plan, eat or drink 3 cups of dairy every day.  1 cup is equal to:  1 cup milk, yogurt, cottage cheese, or soy milk (soy beverage).  2 oz processed cheese.  1½ oz natural cheese.  Fats, oils, salt, and sugars  Only small amounts of oils are recommended.  Avoid foods that are high in calories and low in nutritional value (empty calories), like foods high in fat or added sugars.  Choose foods that are low in salt (sodium). Choose foods that have less than 140 milligrams (mg) of sodium per serving.  Drink water instead of sugary drinks. Drink enough fluid to keep your urine pale yellow.  Where to find support  Work with your health care provider or a dietitian to develop a customized eating plan that is right for you.  Download an jame (mobile application) to help you track your daily food intake.  Where to find more information  USDA: ChooseMyPlate.gov  Summary  MyPlate is a general guideline for healthy eating from the USDA. It is based on the Dietary Guidelines for Americans, 4220-7615.  In general, fruits and vegetables should take up one half of your plate, grains should take up one fourth of your plate, and protein should take up one fourth of your plate.  This information is not intended to replace advice given to you by your health care provider. Make sure you discuss any questions you have with your health care provider.  Document Revised: 11/08/2021 Document Reviewed: 11/08/2021  [employer] Patient Education © 2022 [employer].

## 2023-03-20 RX ORDER — RIZATRIPTAN BENZOATE 10 MG/1
10 TABLET ORAL ONCE AS NEEDED
Qty: 10 TABLET | Refills: 3 | Status: SHIPPED | OUTPATIENT
Start: 2023-03-20 | End: 2023-04-19

## 2023-03-20 RX ORDER — TOPIRAMATE 50 MG/1
50 TABLET, FILM COATED ORAL 2 TIMES DAILY
Qty: 60 TABLET | Refills: 2 | Status: SHIPPED | OUTPATIENT
Start: 2023-03-20

## 2023-03-20 NOTE — TELEPHONE ENCOUNTER
Rx Refill Note  Requested Prescriptions     Pending Prescriptions Disp Refills   • topiramate (Topamax) 50 MG tablet 60 tablet 2     Sig: Take 1 tablet by mouth 2 (Two) Times a Day.      Last filled:01/11/2023 with 2 refills. Sent  Last office visit with prescribing clinician: 9/14/2022      Next office visit with prescribing clinician: 3/17/2023     Jean Kim MA  03/20/23, 08:29 EDT

## 2023-03-20 NOTE — TELEPHONE ENCOUNTER
Rx Refill Note  Requested Prescriptions     Pending Prescriptions Disp Refills   • rizatriptan (Maxalt) 10 MG tablet 10 tablet 3     Sig: Take 1 tablet by mouth 1 (One) Time As Needed for Migraine for up to 30 days. May repeat in 2 hours if needed      Last filled: 02/09/2023. Pt requesting new pharmacy. Sent  Last office visit with prescribing clinician: 9/14/2022      Next office visit with prescribing clinician: 5/11/2023     Jean Kim MA  03/20/23, 08:30 EDT

## 2023-05-05 ENCOUNTER — HOSPITAL ENCOUNTER (EMERGENCY)
Facility: HOSPITAL | Age: 28
Discharge: HOME | End: 2023-05-06
Attending: EMERGENCY MEDICINE
Payer: COMMERCIAL

## 2023-05-05 VITALS
OXYGEN SATURATION: 99 % | DIASTOLIC BLOOD PRESSURE: 96 MMHG | RESPIRATION RATE: 17 BRPM | SYSTOLIC BLOOD PRESSURE: 135 MMHG | BODY MASS INDEX: 36.32 KG/M2 | TEMPERATURE: 98.6 F | HEIGHT: 63 IN | WEIGHT: 205 LBS | HEART RATE: 96 BPM

## 2023-05-05 DIAGNOSIS — T50.905A ADVERSE EFFECT OF DRUG, INITIAL ENCOUNTER: ICD-10-CM

## 2023-05-05 DIAGNOSIS — R33.8 ACUTE URINARY RETENTION: Primary | ICD-10-CM

## 2023-05-05 DIAGNOSIS — N20.0 BILATERAL NEPHROLITHIASIS: ICD-10-CM

## 2023-05-05 DIAGNOSIS — R91.8 PULMONARY NODULES: ICD-10-CM

## 2023-05-05 PROCEDURE — 51702 INSERT TEMP BLADDER CATH: CPT

## 2023-05-05 PROCEDURE — 99283 EMERGENCY DEPT VISIT LOW MDM: CPT

## 2023-05-05 PROCEDURE — 81025 URINE PREGNANCY TEST: CPT | Performed by: PHYSICIAN ASSISTANT

## 2023-05-05 PROCEDURE — 81003 URINALYSIS AUTO W/O SCOPE: CPT | Performed by: EMERGENCY MEDICINE

## 2023-05-06 ENCOUNTER — APPOINTMENT (OUTPATIENT)
Dept: CT IMAGING | Facility: HOSPITAL | Age: 28
End: 2023-05-06
Payer: COMMERCIAL

## 2023-05-06 LAB
ALBUMIN SERPL-MCNC: 4.3 G/DL (ref 3.5–5.2)
ALBUMIN/GLOB SERPL: 1.6 G/DL
ALP SERPL-CCNC: 108 U/L (ref 39–117)
ALT SERPL W P-5'-P-CCNC: 26 U/L (ref 1–33)
ANION GAP SERPL CALCULATED.3IONS-SCNC: 12 MMOL/L (ref 5–15)
AST SERPL-CCNC: 22 U/L (ref 1–32)
B-HCG UR QL: NEGATIVE
BASOPHILS # BLD AUTO: 0.05 10*3/MM3 (ref 0–0.2)
BASOPHILS NFR BLD AUTO: 0.5 % (ref 0–1.5)
BILIRUB SERPL-MCNC: <0.2 MG/DL (ref 0–1.2)
BILIRUB UR QL STRIP: NEGATIVE
BUN SERPL-MCNC: 12 MG/DL (ref 6–20)
BUN/CREAT SERPL: 16.7 (ref 7–25)
CALCIUM SPEC-SCNC: 9.1 MG/DL (ref 8.6–10.5)
CHLORIDE SERPL-SCNC: 108 MMOL/L (ref 98–107)
CLARITY UR: CLEAR
CO2 SERPL-SCNC: 23 MMOL/L (ref 22–29)
COLOR UR: YELLOW
CREAT SERPL-MCNC: 0.72 MG/DL (ref 0.57–1)
D-LACTATE SERPL-SCNC: 1.7 MMOL/L (ref 0.5–2)
DEPRECATED RDW RBC AUTO: 44.8 FL (ref 37–54)
EGFRCR SERPLBLD CKD-EPI 2021: 117 ML/MIN/1.73
EOSINOPHIL # BLD AUTO: 0.24 10*3/MM3 (ref 0–0.4)
EOSINOPHIL NFR BLD AUTO: 2.6 % (ref 0.3–6.2)
ERYTHROCYTE [DISTWIDTH] IN BLOOD BY AUTOMATED COUNT: 15.6 % (ref 12.3–15.4)
EXPIRATION DATE: NORMAL
GLOBULIN UR ELPH-MCNC: 2.7 GM/DL
GLUCOSE SERPL-MCNC: 112 MG/DL (ref 65–99)
GLUCOSE UR STRIP-MCNC: NEGATIVE MG/DL
HCT VFR BLD AUTO: 37.1 % (ref 34–46.6)
HGB BLD-MCNC: 11.4 G/DL (ref 12–15.9)
HGB UR QL STRIP.AUTO: NEGATIVE
IMM GRANULOCYTES # BLD AUTO: 0.04 10*3/MM3 (ref 0–0.05)
IMM GRANULOCYTES NFR BLD AUTO: 0.4 % (ref 0–0.5)
INTERNAL NEGATIVE CONTROL: NEGATIVE
INTERNAL POSITIVE CONTROL: POSITIVE
KETONES UR QL STRIP: NEGATIVE
LEUKOCYTE ESTERASE UR QL STRIP.AUTO: NEGATIVE
LYMPHOCYTES # BLD AUTO: 3.04 10*3/MM3 (ref 0.7–3.1)
LYMPHOCYTES NFR BLD AUTO: 33.2 % (ref 19.6–45.3)
Lab: NORMAL
MCH RBC QN AUTO: 24.4 PG (ref 26.6–33)
MCHC RBC AUTO-ENTMCNC: 30.7 G/DL (ref 31.5–35.7)
MCV RBC AUTO: 79.4 FL (ref 79–97)
MONOCYTES # BLD AUTO: 0.61 10*3/MM3 (ref 0.1–0.9)
MONOCYTES NFR BLD AUTO: 6.7 % (ref 5–12)
NEUTROPHILS NFR BLD AUTO: 5.18 10*3/MM3 (ref 1.7–7)
NEUTROPHILS NFR BLD AUTO: 56.6 % (ref 42.7–76)
NITRITE UR QL STRIP: NEGATIVE
NRBC BLD AUTO-RTO: 0 /100 WBC (ref 0–0.2)
PH UR STRIP.AUTO: 6 [PH] (ref 5–8)
PLATELET # BLD AUTO: 295 10*3/MM3 (ref 140–450)
PMV BLD AUTO: 11.2 FL (ref 6–12)
POTASSIUM SERPL-SCNC: 3.5 MMOL/L (ref 3.5–5.2)
PROT SERPL-MCNC: 7 G/DL (ref 6–8.5)
PROT UR QL STRIP: NEGATIVE
RBC # BLD AUTO: 4.67 10*6/MM3 (ref 3.77–5.28)
SODIUM SERPL-SCNC: 143 MMOL/L (ref 136–145)
SP GR UR STRIP: 1.02 (ref 1–1.03)
UROBILINOGEN UR QL STRIP: NORMAL
WBC NRBC COR # BLD: 9.16 10*3/MM3 (ref 3.4–10.8)

## 2023-05-06 PROCEDURE — 74176 CT ABD & PELVIS W/O CONTRAST: CPT

## 2023-05-06 PROCEDURE — 85025 COMPLETE CBC W/AUTO DIFF WBC: CPT | Performed by: PHYSICIAN ASSISTANT

## 2023-05-06 PROCEDURE — 83605 ASSAY OF LACTIC ACID: CPT | Performed by: PHYSICIAN ASSISTANT

## 2023-05-06 PROCEDURE — 80053 COMPREHEN METABOLIC PANEL: CPT | Performed by: PHYSICIAN ASSISTANT

## 2023-05-06 NOTE — DISCHARGE INSTRUCTIONS
ER evaluation revealed acute urinary retention, most likely related to Cogentin.  The half life of medication is 1 1/2 days.  Do not take any more Cogentin.  The medication should wear off by Saturday evening.  Nursing staff advised patient on how to remove the capone catheter on her own, but if she has any concerns or meets resistance, return to the ER for assistance.  Follow up with Urology, Dr. Munoz, as needed.  We gave his phone number.  Patient also had incidental finding of 2 small 4mm pulmonary nodules to left lower lung.  We will refer patient to the pulmonary nodule clinic for recheck CT.  No significant risk factors or concerns.  Patient is a non-smoker.

## 2023-05-06 NOTE — ED PROVIDER NOTES
Subjective   History of Present Illness  This is a 28-year-old female that presents the ER with acute urinary retention.  Patient follows with psychiatry for depression.  She has previously been on Abilify as a mood stabilizer in addition to Topamax.  Patient says that she was having symptoms of restlessness with Abilify and contacted her psychiatrist.  He decided to change her from Abilify to Cogentin twice daily for symptoms of restlessness.  Patient has been on that medication for 2 weeks.  In the last 2-3 days, she has describes some urinary hesitancy, but once she gets the urine flow started, she feels like she is able to empty her bladder.  This morning at around 6:30 AM, patient urinated, but she has not urinated since.  It has been 17 hours.  She describes significant bladder distention and suprapubic discomfort.  She denies any flank or back pain.  She does have history of kidney stones.  She felt like she had some mild dysuria when she urinated this morning for the last time.  She denies any nausea or vomiting.  She denies fever or chills.  Patient had a loose stool yesterday but no bowel movement today.  Past medical history is also significant for obesity with BMI of 36, prediabetes, PCOS, history of migraine, and history of depression.  No other concerns at this time.    History provided by:  Patient  Urinary Retention  Location:  Pt started on Benztropine 2 wks ago with urinary hesitancy x 1 week, and acute urinary retention since 0630am.  Also, recent dysuria and urgency.    Duration:  17 hours  Chronicity:  New  Context:  Pt being treated per Psychiatry for depression.  She was on Abilify, but having restlessness.  Psychiatry changed med to Benztropine BID 2 wks ago.  Increased urinary hesitancy x 1 wk, then acute retention at 0630am today.  No fever/chills.  No n/v.  No CVA pain.  H/o kidney stones.  Associated symptoms: abdominal pain (bladder distention, suprapubic discomfort.)    Associated  symptoms: no chest pain, no congestion, no cough, no diarrhea (Loose stool yesterday.), no ear pain, no fatigue, no fever, no headaches, no myalgias, no nausea, no shortness of breath and no vomiting        Review of Systems   Constitutional: Negative.  Negative for activity change, appetite change, chills, diaphoresis, fatigue and fever.   HENT: Negative.  Negative for congestion and ear pain.    Respiratory: Negative for cough and shortness of breath.    Cardiovascular: Negative for chest pain.   Gastrointestinal: Positive for abdominal distention (Bladder distention.) and abdominal pain (bladder distention, suprapubic discomfort.). Negative for constipation, diarrhea (Loose stool yesterday.), nausea and vomiting.   Genitourinary: Positive for difficulty urinating, dysuria and urgency. Negative for flank pain and frequency.        LMP: Irregular.  Home pregnancy test 3 days ago.  History of kidney stones.  Urinary hesitancy x 2-3 days and acute urinary retention since 0630 am this morning. Recent dysuria, as well.   Musculoskeletal: Negative.  Negative for back pain and myalgias.   Neurological: Negative.  Negative for dizziness and headaches.   Psychiatric/Behavioral: Positive for behavioral problems (Pt being treated for depression for Psychiatry and started on Benztropine 2 wks ago with acute urinary sxs.).   All other systems reviewed and are negative.      Past Medical History:   Diagnosis Date   • Anemia    • Anxiety    • Asthma    • Depression    • Hypothyroidism    • Kidney stone    • Migraine    • Ovarian cyst    • PCOS (polycystic ovarian syndrome)        No Known Allergies    No past surgical history on file.    Family History   Problem Relation Age of Onset   • Thyroid disease Mother    • Hypertension Mother    • Seizures Mother    • Arthritis Mother    • Cancer Father    • Heart disease Maternal Grandmother    • Stroke Maternal Grandmother    • Kidney disease Maternal Grandfather    • Cancer Maternal  Grandfather    • Diabetes Maternal Grandfather        Social History     Socioeconomic History   • Marital status:    Tobacco Use   • Smoking status: Never   • Smokeless tobacco: Never   Vaping Use   • Vaping Use: Never used   Substance and Sexual Activity   • Alcohol use: Yes     Comment: RARELY   • Drug use: Never   • Sexual activity: Yes     Partners: Male     Birth control/protection: Condom           Objective   Physical Exam  Vitals and nursing note reviewed.   Constitutional:       General: She is not in acute distress.     Appearance: Normal appearance. She is obese. She is not ill-appearing, toxic-appearing or diaphoretic.      Comments: Patient appears uncomfortable secondary to acute urinary retention.  Nontoxic.   HENT:      Head: Normocephalic and atraumatic.      Nose: Nose normal.      Mouth/Throat:      Mouth: Mucous membranes are moist.      Comments: Oral mucous membranes are moist  Eyes:      Extraocular Movements: Extraocular movements intact.      Conjunctiva/sclera: Conjunctivae normal.      Pupils: Pupils are equal, round, and reactive to light.   Cardiovascular:      Rate and Rhythm: Normal rate and regular rhythm.  No extrasystoles are present.     Pulses: Normal pulses.      Heart sounds: Normal heart sounds.      Comments: Regular rate and rhythm.  No ectopy.  Pulmonary:      Effort: Pulmonary effort is normal.      Breath sounds: Normal breath sounds.      Comments: Lungs are clear to auscultation bilaterally  Abdominal:      General: Bowel sounds are normal. There is distension.      Palpations: Abdomen is soft.      Tenderness: There is abdominal tenderness in the suprapubic area. There is no right CVA tenderness, left CVA tenderness, guarding or rebound.      Comments: Central obesity with bladder distention.  Tenderness to mid suprapubic region.  No flank or CVA tenderness.  Abdominal exam is benign and nonsurgical   Musculoskeletal:         General: Normal range of motion.       Cervical back: Normal range of motion and neck supple.      Right lower leg: No edema.      Left lower leg: No edema.   Skin:     General: Skin is warm and dry.   Neurological:      General: No focal deficit present.      Mental Status: She is alert and oriented to person, place, and time.      Cranial Nerves: Cranial nerves 2-12 are intact.      Sensory: Sensation is intact.      Motor: Motor function is intact.      Coordination: Coordination is intact.      Gait: Gait is intact.         Procedures           ED Course  ED Course as of 05/06/23 0305   Sat May 06, 2023   0253 CBC and chemistries were within normal limits.  Lactic acid is 1.7.  Urine pregnancy is negative.  Bladder scan was attempted and was unsuccessful.  Due to no urine output x17 hours, we proceeded withFoley catheterization and patient had approximately 550 mL of output.  She felt markedly improved.  There was no evidence of urinary tract infection on cath sample.  CT of the abdomen/pelvis without contrast reveals bilateral nonobstructive renal stones measuring up to 5 mm.  No obstructive ureteral stone or hydronephrosis seen.  Patient has 2 4 mm pulmonary nodules at the left lung base.  Recommend follow-up per Fleischner criteria.  Patient is a non-smoker.  We will give her referral to the pulmonary nodule clinic.  Discussed adverse side effects of Cogentin with hospital pharmacist.  They have seen urinary retention with initiation of this medication.  I discussed the case with on-call urology, Dr. Munoz.  He is not very familiar with this medication, but he inquired about the half-life of Cogentin.  Discussed this with hospital pharmacist and they said that the half-life is 1-1/2 days.  Dr. Mccormick, recommended that patient keep Forte catheter in for 1-1/2 days and then nursing staff can instruct patient on removal of Forte catheter at home.  He said that patient is happy to consult with him, but he feels that urinary retention is medication  related.  Nursing staff showed patient in great detail how to remove Forte catheter, and she feels very comfortable.  We advised patient if she has any concerns or meets resistance, come back to the ER and we will remove her Forte catheter.  She is recommended to do this on Saturday evening, 5/6/2023.  There was no evidence of acute urinary tract infection or ureteral stone.  Patient recommended to discontinue Cogentin.  She last took her dose of Cogentin on the morning of 5/5/2023.  Patient feels very comfortable with discharge plan.  Return sooner to the ER if worsening symptoms. [FC]      ED Course User Index  [FC] Sera Everett, ANA                 Recent Results (from the past 24 hour(s))   Urinalysis With Culture If Indicated - Indwelling Urethral Catheter    Collection Time: 05/05/23 11:56 PM    Specimen: Indwelling Urethral Catheter; Urine   Result Value Ref Range    Color, UA Yellow Yellow, Straw    Appearance, UA Clear Clear    pH, UA 6.0 5.0 - 8.0    Specific Gravity, UA 1.017 1.001 - 1.030    Glucose, UA Negative Negative    Ketones, UA Negative Negative    Bilirubin, UA Negative Negative    Blood, UA Negative Negative    Protein, UA Negative Negative    Leuk Esterase, UA Negative Negative    Nitrite, UA Negative Negative    Urobilinogen, UA 1.0 E.U./dL 0.2 - 1.0 E.U./dL   Comprehensive Metabolic Panel    Collection Time: 05/06/23 12:05 AM    Specimen: Blood   Result Value Ref Range    Glucose 112 (H) 65 - 99 mg/dL    BUN 12 6 - 20 mg/dL    Creatinine 0.72 0.57 - 1.00 mg/dL    Sodium 143 136 - 145 mmol/L    Potassium 3.5 3.5 - 5.2 mmol/L    Chloride 108 (H) 98 - 107 mmol/L    CO2 23.0 22.0 - 29.0 mmol/L    Calcium 9.1 8.6 - 10.5 mg/dL    Total Protein 7.0 6.0 - 8.5 g/dL    Albumin 4.3 3.5 - 5.2 g/dL    ALT (SGPT) 26 1 - 33 U/L    AST (SGOT) 22 1 - 32 U/L    Alkaline Phosphatase 108 39 - 117 U/L    Total Bilirubin <0.2 0.0 - 1.2 mg/dL    Globulin 2.7 gm/dL    A/G Ratio 1.6 g/dL    BUN/Creatinine Ratio  16.7 7.0 - 25.0    Anion Gap 12.0 5.0 - 15.0 mmol/L    eGFR 117.0 >60.0 mL/min/1.73   Lactic Acid, Plasma    Collection Time: 05/06/23 12:05 AM    Specimen: Blood   Result Value Ref Range    Lactate 1.7 0.5 - 2.0 mmol/L   CBC Auto Differential    Collection Time: 05/06/23 12:05 AM    Specimen: Blood   Result Value Ref Range    WBC 9.16 3.40 - 10.80 10*3/mm3    RBC 4.67 3.77 - 5.28 10*6/mm3    Hemoglobin 11.4 (L) 12.0 - 15.9 g/dL    Hematocrit 37.1 34.0 - 46.6 %    MCV 79.4 79.0 - 97.0 fL    MCH 24.4 (L) 26.6 - 33.0 pg    MCHC 30.7 (L) 31.5 - 35.7 g/dL    RDW 15.6 (H) 12.3 - 15.4 %    RDW-SD 44.8 37.0 - 54.0 fl    MPV 11.2 6.0 - 12.0 fL    Platelets 295 140 - 450 10*3/mm3    Neutrophil % 56.6 42.7 - 76.0 %    Lymphocyte % 33.2 19.6 - 45.3 %    Monocyte % 6.7 5.0 - 12.0 %    Eosinophil % 2.6 0.3 - 6.2 %    Basophil % 0.5 0.0 - 1.5 %    Immature Grans % 0.4 0.0 - 0.5 %    Neutrophils, Absolute 5.18 1.70 - 7.00 10*3/mm3    Lymphocytes, Absolute 3.04 0.70 - 3.10 10*3/mm3    Monocytes, Absolute 0.61 0.10 - 0.90 10*3/mm3    Eosinophils, Absolute 0.24 0.00 - 0.40 10*3/mm3    Basophils, Absolute 0.05 0.00 - 0.20 10*3/mm3    Immature Grans, Absolute 0.04 0.00 - 0.05 10*3/mm3    nRBC 0.0 0.0 - 0.2 /100 WBC   POC Urine Pregnancy    Collection Time: 05/06/23 12:09 AM    Specimen: Urine   Result Value Ref Range    HCG, Urine, QL Negative Negative    Lot Number [id number]     Internal Positive Control Positive Positive, Passed    Internal Negative Control Negative Negative, Passed    Expiration Date 07/27/2024      Note: In addition to lab results from this visit, the labs listed above may include labs taken at another facility or during a different encounter within the last 24 hours. Please correlate lab times with ED admission and discharge times for further clarification of the services performed during this visit.    CT Abdomen Pelvis Without Contrast   Final Result      Exam limited by lack of IV contrast.      Bilateral  "nonobstructive renal stones measuring up to 5 mm. No obstructive ureteral stone or hydronephrosis seen.      Two 4 mm pulmonary nodules at the left lung base. Recommend follow-up per Fleischner criteria.      FLEISCHNER SOCIETY GUIDELINES FOR LUNG NODULE FOLLOWUP:      Low risk patient:   </=4mm - no follow up   >4-6mm - low dose CT 12 mo.  if stable, no further f/u   >6-8mm - low dose CT 6-12 mo, then 2nd f/u CT at 18-24mo   >8mm - low dose CT 3,9,24mo OR Biopsy      High Risk Patient:   </=4mm - low dose CT 12 mo.  if stable no further f/u   >4-6mm - low dose CT 6-12mo, then 18-24 mo   >6-8mm - low dose 3-6mo then 9-12 mo, and final at 24 mo   >8mm - low dose CT 3,9,24 mo OR Biopsy      Low risk patients:  Minimal/absent history of smoking and other risk factors for lung cancer.    High risk patients:  Smokers or other risk factors for lung cancer (1st degree relative with lung cancer, exposure to asbestos, uranium, or radon)      Electronically signed by:  Olga Elias M.D.     5/5/2023 10:50 PM Mountain Time        Vitals:    05/05/23 2310   BP: 135/96   BP Location: Left arm   Patient Position: Sitting   Pulse: 96   Resp: 17   Temp: 98.6 °F (37 °C)   TempSrc: Oral   SpO2: 99%   Weight: 93 kg (205 lb)   Height: 160 cm (63\")     Medications - No data to display  ECG/EMG Results (last 24 hours)     ** No results found for the last 24 hours. **        No orders to display                                    MDM    Final diagnoses:   Acute urinary retention   Adverse effect of drug, initial encounter   Bilateral nephrolithiasis   Pulmonary nodules       ED Disposition  ED Disposition     ED Disposition   Discharge    Condition   Stable    Comment   --             Shiva Munoz MD  [address] [place] [address]  [phone]    Call in 2 days  As needed    [facility]  [address] [facility] [address] [phone]    If symptoms " [facility]  [address]  [facility] [id number] [phone]  Call in 2 days  Call Monday for f/u         Medication List      No changes were made to your prescriptions during this visit.          Sera Everett PA-C  05/06/23 0309

## 2023-05-11 ENCOUNTER — OFFICE VISIT (OUTPATIENT)
Dept: NEUROLOGY | Facility: CLINIC | Age: 28
End: 2023-05-11

## 2023-05-11 VITALS
WEIGHT: 205 LBS | BODY MASS INDEX: 36.32 KG/M2 | HEIGHT: 63 IN | DIASTOLIC BLOOD PRESSURE: 82 MMHG | OXYGEN SATURATION: 97 % | SYSTOLIC BLOOD PRESSURE: 124 MMHG

## 2023-05-11 DIAGNOSIS — G43.019 INTRACTABLE MIGRAINE WITHOUT AURA AND WITHOUT STATUS MIGRAINOSUS: Primary | ICD-10-CM

## 2023-05-11 PROCEDURE — 99214 OFFICE O/P EST MOD 30 MIN: CPT | Performed by: PSYCHIATRY & NEUROLOGY

## 2023-05-11 RX ORDER — TOPIRAMATE 50 MG/1
50 TABLET, FILM COATED ORAL 2 TIMES DAILY
Qty: 180 TABLET | Refills: 1 | Status: SHIPPED | OUTPATIENT
Start: 2023-05-11 | End: 2023-08-09

## 2023-05-11 RX ORDER — BENZTROPINE MESYLATE 0.5 MG/1
TABLET ORAL
COMMUNITY
Start: 2023-05-02

## 2023-05-11 NOTE — PROGRESS NOTES
Subjective:    CC: Elisabeth Martinez is in clinic today for follow up for history of migraines.    HPI:  Follow-up: 7/10/2019: She is in clinic for regular follow-up.  Since her last visit, her insurance did approve Aimovig 140 mg subcutaneous injection.  She has done total of 2 injections since her last visit and reports that she is doing really well.  She has had only one headache in the last 2 months for recheck to take sumatriptan.  She is tolerating Aimovig well without any side effects.    Follow-up: 1/4/2021: She is in clinic for follow-up after July 2019.  She reports that she ran out of her Aimovig in August 2020 and her primary care physician's office could not refill it so since then she is not taking.  Since running out of Aimovig in August, she reports that the migraine intensity and frequency have increased and currently she is experiencing 8-9 breakthrough migraine in a month.  While on Aimovig, it would reduce to 1-2 migraines in a month.  She is currently taking sumatriptan 100 mg as needed as an abortive treatment and it is working well.  She wants to go back on Aimovig.    5/6/2021: She is in clinic for regular follow-up.  Since her last visit in January, she has now restarted Aimovig monthly injections and have done total 3 injections.  With Aimovig, she has again had excellent response and the migraine intensity and frequency has reduced to 1-2 migraines in a month.  She is using Imitrex 100 mg as needed as an abortive treatment and it works well.    2/2/2022: She is in clinic for regular follow-up.  Since her last visit in May 2021, she reports that after August 2021, insurance stopped covering Aimovig so she has not done any more injections since August.  She reports that the migraines have increased in frequency and intensity while with on average 10-12 migraine days in a month.  She is taking sumatriptan frequently and possibly has developed a rebound headaches as well.  She reports that  Imitrex does work well as an abortive treatment.    7/20/2022: She is in clinic for regular follow-up.  Since her last visit in February 2022, she reports that insurance approved Ajovy until May 2022 after which she is not getting it anymore.  Ajovy helped her significantly in keeping migraines under good control with on an average migraine frequency of 3-4 migraine days in a month.  However since May, it has increased to 10-14 migraine days in a month.  She has not been able to get Ajovy since then.    9/14/2022: She is in clinic for regular follow-up.  Since her last visit in July 2022, she has been taking Topamax 50 mg twice daily as insurance denied Ajovy and she reports that she has had excellent response.  She is reporting 1-2 breakthrough migraines in a month with Topamax use.  She has experienced some numbness and tingling involving both her hands and feet but is tolerable.  She is currently using Imitrex as needed as an abortive treatment but many times after the migraine gets better after taking Imitrex, she will get pain in the neck and shoulder area.     5/11/2023: She is in clinic for regular follow-up.  Since her last visit in September 2022, she reports that except for 1 bad week in January, she has done really well and migraines remain under excellent control with on an average 1-2 breakthrough migraines in a month responding well to Maxalt as an abortive treatment.  She denies any side effects with this combination.    The following portions of the patient's history were reviewed and updated as of 05/11/2023: allergies, social history and problem list.       Current Outpatient Medications:   •  albuterol sulfate  (90 Base) MCG/ACT inhaler, Inhale 2 puffs Every 4 (Four) Hours As Needed for Wheezing., Disp: 18 g, Rfl: 1  •  ARIPiprazole (ABILIFY) 5 MG tablet, , Disp: , Rfl:   •  benztropine (COGENTIN) 0.5 MG tablet, , Disp: , Rfl:   •  cetirizine (zyrTEC) 10 MG tablet, Take 1 tablet by mouth  Daily., Disp: 90 tablet, Rfl: 1  •  ciprofloxacin (CILOXAN) 0.3 % ophthalmic solution, Administer 1 drop into the left eye 4 (Four) Times a Day., Disp: 5 mL, Rfl: 0  •  ferrous sulfate 324 (65 Fe) MG tablet delayed-release EC tablet, Take 1 tablet by mouth Daily With Breakfast., Disp: , Rfl:   •  fluticasone (FLONASE) 50 MCG/ACT nasal spray, 2 sprays into the nostril(s) as directed by provider Daily., Disp: 33.3 mL, Rfl: 5  •  hydrOXYzine (ATARAX) 10 MG tablet, Take 1 tablet by mouth Every 8 (Eight) Hours As Needed for Itching., Disp: 90 tablet, Rfl: 0  •  metFORMIN ER (GLUCOPHAGE-XR) 750 MG 24 hr tablet, Take 1 tablet by mouth Daily With Breakfast., Disp: 90 tablet, Rfl: 1  •  montelukast (SINGULAIR) 10 MG tablet, Take 1 tablet by mouth Every Night. NEEDS APPOINTMENT FOR NEXT REFILL, Disp: 90 tablet, Rfl: 1  •  multivitamin with minerals tablet tablet, Take 1 tablet by mouth Daily., Disp: , Rfl:   •  norethindrone (MICRONOR) 0.35 MG tablet, Take 1 tablet by mouth Daily., Disp: 28 tablet, Rfl: 12  •  topiramate (Topamax) 50 MG tablet, Take 1 tablet by mouth 2 (Two) Times a Day for 90 days., Disp: 180 tablet, Rfl: 1  •  vilazodone (Viibryd) 40 MG tablet tablet, Take 1 tablet by mouth Daily., Disp: 90 tablet, Rfl: 1  •  vitamin C (ASCORBIC ACID) 250 MG tablet, Take 1 tablet by mouth Daily., Disp: , Rfl:   •  Vitamin D, Cholecalciferol, 50 MCG (2000 UT) capsule, Take 2 capsules by mouth., Disp: , Rfl:   •  rizatriptan (Maxalt) 10 MG tablet, Take 1 tablet by mouth 1 (One) Time As Needed for Migraine for up to 30 days. May repeat in 2 hours if needed, Disp: 10 tablet, Rfl: 3   Past Medical History:   Diagnosis Date   • Anemia    • Anxiety    • Asthma    • Depression    • Hypothyroidism    • Kidney stone    • Migraine    • Ovarian cyst    • PCOS (polycystic ovarian syndrome)       History reviewed. No pertinent surgical history.   Family History   Problem Relation Age of Onset   • Thyroid disease Mother    • Hypertension  "Mother    • Seizures Mother    • Arthritis Mother    • Cancer Father    • Heart disease Maternal Grandmother    • Stroke Maternal Grandmother    • Kidney disease Maternal Grandfather    • Cancer Maternal Grandfather    • Diabetes Maternal Grandfather         Review of Systems  Objective:    /82   Ht 160 cm (63\")   Wt 93 kg (205 lb)   SpO2 97%   BMI 36.31 kg/m²     Neurology Exam:  General apperance: NAD.     Mental status: Alert, awake and oriented to time place and person.    Recent and Remote memory: Can recall 3/3 objects at 5 minutes. Can recall historical events.     Attention span and Concentration: Serial 7s: Normal.     Fund of knowledge:  Normal.     Language and Speech: No aphasia or dysarthria.    Naming , Repitition and Comprehension:  Can name objects, repeat a sentence and follow commands. Speech is clear and fluent with good repetition, comprehension, and naming.    CN II to XII: Intact.    Opthalmoscopic Exam: No papilledema.    Motor:  Right UE muscle strength 5/5. Normal tone.     Left UE muscle strength 5/5. Normal tone.      Right LE muscle strength5/5. Normal tone.     Left LE muscle strength 5/5. Normal tone.      Sensory: Normal light touch, vibration and pinprick sensation bilaterally.    DTRs: 2+ bilaterally.    Babinski: Negative bilaterally.    Co-ordination: Normal finger-to-nose, heel to shin B/L.    Rhomberg: Negative.    Gait: Normal.    Cardiovascular: Regular rate and rhythm without murmur, gallop or rub.    Assessment and Plan:  1. Intractable migraine without aura and without status migrainosus  -Under excellent control with use of Topamax 50 mg twice daily.  Current migraine frequency is 1-2 breakthrough migraines in a month responding well to Maxalt as needed as an abortive treatment.  Continue with this combination and I will see her back in clinic in 6 months for follow-up.       I spent 30 minutes in patient care: Reviewing records prior to the visit, entering orders " and documentation and spent more than calvo 50% of this time face-to-face in management, instructions and education regarding above mentioned diagnosis and also on counseling and discussing about taking medication regularly, possible side effects with medication use, importance of good sleep hygiene, good hydration and regular exercise.    Return in about 6 months (around 11/11/2023).       Note to patient: The 21st Century Cures Act makes medical notes like these available to patients in the interest of transparency. However, be advised this is a medical document. It is intended as peer to peer communication. It is written in medical language and may contain abbreviations or verbiage that are unfamiliar. It may appear blunt or direct. Medical documents are intended to carry relevant information, facts as evident, and the clinical opinion of the physician.

## 2023-05-12 ENCOUNTER — PATIENT ROUNDING (BHMG ONLY) (OUTPATIENT)
Dept: NEUROLOGY | Facility: CLINIC | Age: 28
End: 2023-05-12
Payer: COMMERCIAL

## 2023-05-12 DIAGNOSIS — F32.1 MODERATE MAJOR DEPRESSION: ICD-10-CM

## 2023-05-12 RX ORDER — VILAZODONE HYDROCHLORIDE 40 MG/1
40 TABLET ORAL DAILY
Qty: 90 TABLET | Refills: 1 | Status: SHIPPED | OUTPATIENT
Start: 2023-05-12

## 2023-05-12 NOTE — PROGRESS NOTES
A My-Chart message has been sent to the patient for PATIENT ROUNDING with [facility]

## 2023-05-16 ENCOUNTER — OFFICE VISIT (OUTPATIENT)
Dept: PULMONOLOGY | Facility: CLINIC | Age: 28
End: 2023-05-16
Payer: COMMERCIAL

## 2023-05-16 VITALS
HEIGHT: 63 IN | HEART RATE: 112 BPM | SYSTOLIC BLOOD PRESSURE: 120 MMHG | TEMPERATURE: 97.8 F | BODY MASS INDEX: 36.46 KG/M2 | WEIGHT: 205.8 LBS | DIASTOLIC BLOOD PRESSURE: 82 MMHG | OXYGEN SATURATION: 98 %

## 2023-05-16 DIAGNOSIS — R91.1 LUNG NODULE: Primary | ICD-10-CM

## 2023-05-16 NOTE — PROGRESS NOTES
New Patient Pulmonary Office Visit      Patient Name: Elisabeth Martinez    Referring Physician: Sera Everett PA-C    Chief Complaint:    Chief Complaint   Patient presents with   • Lung Nodule     F/u        History of Present Illness: Elisabeth Martinez is a 28 y.o. female who is here today to establish care with Pulmonary.  Patient is a past medical history significant for migraines.  Who was referred to pulmonary for evaluation of pulmonary nodule.  Patient was seen in the ER in May 2023 with abdominal pain, back pain, urinary retention.  This led to a CT of the abdomen pelvis, on that CT scan there was found be some atelectasis and a 4 mm left lower lobe nodule.  Patient denies any chest pain, nausea, fever, chills, shortness of breath.  No other acute complaints.    Review of Systems:   Review of Systems   Constitutional: Negative for activity change, appetite change, chills and diaphoresis.   HENT: Negative for congestion, postnasal drip, sinus pressure and voice change.    Eyes: Negative for blurred vision.   Respiratory: Negative for cough, shortness of breath and wheezing.    Cardiovascular: Negative for chest pain.   Gastrointestinal: Negative for abdominal pain.   Musculoskeletal: Negative for myalgias.   Skin: Negative for color change and dry skin.   Allergic/Immunologic: Negative for environmental allergies.   Neurological: Negative for weakness and confusion.   Hematological: Negative for adenopathy.   Psychiatric/Behavioral: Negative for sleep disturbance and depressed mood.       Past Medical History:   Past Medical History:   Diagnosis Date   • Anemia    • Anxiety    • Asthma    • Depression    • Hypothyroidism    • Kidney stone    • Migraine    • Ovarian cyst    • PCOS (polycystic ovarian syndrome)        Past Surgical History: History reviewed. No pertinent surgical history.    Family History:   Family History   Problem Relation Age of Onset   • Thyroid disease Mother    • Hypertension  Mother    • Seizures Mother    • Arthritis Mother    • Cancer Father    • Heart disease Maternal Grandmother    • Stroke Maternal Grandmother    • Kidney disease Maternal Grandfather    • Cancer Maternal Grandfather    • Diabetes Maternal Grandfather        Social History:   Social History     Socioeconomic History   • Marital status:    Tobacco Use   • Smoking status: Never   • Smokeless tobacco: Never   Vaping Use   • Vaping Use: Never used   Substance and Sexual Activity   • Alcohol use: Yes     Comment: RARELY   • Drug use: Never   • Sexual activity: Yes     Partners: Male     Birth control/protection: Condom       Medications:     Current Outpatient Medications:   •  albuterol sulfate  (90 Base) MCG/ACT inhaler, Inhale 2 puffs Every 4 (Four) Hours As Needed for Wheezing., Disp: 18 g, Rfl: 1  •  ARIPiprazole (ABILIFY) 5 MG tablet, , Disp: , Rfl:   •  benztropine (COGENTIN) 0.5 MG tablet, , Disp: , Rfl:   •  cetirizine (zyrTEC) 10 MG tablet, Take 1 tablet by mouth Daily., Disp: 90 tablet, Rfl: 1  •  ciprofloxacin (CILOXAN) 0.3 % ophthalmic solution, Administer 1 drop into the left eye 4 (Four) Times a Day., Disp: 5 mL, Rfl: 0  •  ferrous sulfate 324 (65 Fe) MG tablet delayed-release EC tablet, Take 1 tablet by mouth Daily With Breakfast., Disp: , Rfl:   •  fluticasone (FLONASE) 50 MCG/ACT nasal spray, 2 sprays into the nostril(s) as directed by provider Daily., Disp: 33.3 mL, Rfl: 5  •  hydrOXYzine (ATARAX) 10 MG tablet, Take 1 tablet by mouth Every 8 (Eight) Hours As Needed for Itching., Disp: 90 tablet, Rfl: 0  •  metFORMIN ER (GLUCOPHAGE-XR) 750 MG 24 hr tablet, Take 1 tablet by mouth Daily With Breakfast., Disp: 90 tablet, Rfl: 1  •  montelukast (SINGULAIR) 10 MG tablet, Take 1 tablet by mouth Every Night. NEEDS APPOINTMENT FOR NEXT REFILL, Disp: 90 tablet, Rfl: 1  •  multivitamin with minerals tablet tablet, Take 1 tablet by mouth Daily., Disp: , Rfl:   •  norethindrone (MICRONOR) 0.35 MG  "tablet, Take 1 tablet by mouth Daily., Disp: 28 tablet, Rfl: 12  •  topiramate (Topamax) 50 MG tablet, Take 1 tablet by mouth 2 (Two) Times a Day for 90 days., Disp: 180 tablet, Rfl: 1  •  vilazodone (Viibryd) 40 MG tablet tablet, Take 1 tablet by mouth Daily., Disp: 90 tablet, Rfl: 1  •  vitamin C (ASCORBIC ACID) 250 MG tablet, Take 1 tablet by mouth Daily., Disp: , Rfl:   •  Vitamin D, Cholecalciferol, 50 MCG (2000 UT) capsule, Take 2 capsules by mouth., Disp: , Rfl:   •  rizatriptan (Maxalt) 10 MG tablet, Take 1 tablet by mouth 1 (One) Time As Needed for Migraine for up to 30 days. May repeat in 2 hours if needed, Disp: 10 tablet, Rfl: 3    Allergies:   No Known Allergies    Physical Exam:  Vital Signs:   Vitals:    05/16/23 1508   BP: 120/82   BP Location: Right arm   Patient Position: Sitting   Cuff Size: Adult   Pulse: 112   Temp: 97.8 °F (36.6 °C)   TempSrc: Infrared   SpO2: 98%  Comment: resting room air   Weight: 93.4 kg (205 lb 12.8 oz)   Height: 160 cm (62.99\")       Physical Exam  Vitals and nursing note reviewed.   Constitutional:       General: She is not in acute distress.     Appearance: She is well-developed and normal weight. She is not ill-appearing or toxic-appearing.   HENT:      Head: Normocephalic and atraumatic.   Cardiovascular:      Rate and Rhythm: Normal rate and regular rhythm.      Pulses: Normal pulses.      Heart sounds: Normal heart sounds. No murmur heard.    No friction rub. No gallop.   Pulmonary:      Effort: Pulmonary effort is normal. No respiratory distress.      Breath sounds: Normal breath sounds. No wheezing, rhonchi or rales.   Musculoskeletal:      Right lower leg: No edema.      Left lower leg: No edema.   Skin:     General: Skin is warm and dry.   Neurological:      Mental Status: She is alert and oriented to person, place, and time.         Immunization History   Administered Date(s) Administered   • COVID-19 (PFIZER) Purple Cap Monovalent 02/18/2021, 02/18/2021, " 03/17/2021, 03/17/2021   • HPV Quadrivalent 03/29/2013, 12/16/2013   • Meningococcal Conjugate 08/13/2013   • Tdap 05/04/2022       Results Review:   - I personally reviewed the pts imaging from CT scan of the abdomen pelvis from 2023 and 2020 which showed a 4 mm noncalcified nodule, with no other abnormalities regarding the lung windows.  - I personally reviewed the pts chart with regards to recent ER evaluation.    Assessment / Plan:   Diagnoses and all orders for this visit:    1. 4 mm left lower lobe noncalcified nodule (2020) (Primary)  -I reviewed the patient's CT scans with her.  She had a CT scan done in 2023 which showed a 4 mm nodule, in review of the 2020 CT abdomen pelvis scan with regards to lung windows.  There is a 4 mm nodule at that same time in the same position that is unchanged.  Given that and the fact that she is a low risk patient.  No further imaging is required.  She verbalized understanding of this and agreed with the plan.      Follow Up:   Return if symptoms worsen or fail to improve.     KENDAL Rose, DO  Pulmonary and Critical Care Medicine  Note Electronically Signed    Part of this note may be an electronic transcription/translation of spoken language to printed text using the Dragon Dictation System.

## 2023-06-06 DIAGNOSIS — F41.9 ANXIETY: ICD-10-CM

## 2023-06-06 RX ORDER — HYDROXYZINE HYDROCHLORIDE 10 MG/1
TABLET, FILM COATED ORAL
Qty: 90 TABLET | Refills: 0 | Status: SHIPPED | OUTPATIENT
Start: 2023-06-06

## 2023-07-30 DIAGNOSIS — F32.1 MODERATE MAJOR DEPRESSION: ICD-10-CM

## 2023-07-30 DIAGNOSIS — J45.20 MILD INTERMITTENT ASTHMA WITHOUT COMPLICATION: ICD-10-CM

## 2023-07-30 DIAGNOSIS — J30.2 SEASONAL ALLERGIES: ICD-10-CM

## 2023-07-31 RX ORDER — MONTELUKAST SODIUM 10 MG/1
10 TABLET ORAL NIGHTLY
Qty: 90 TABLET | Refills: 1 | Status: SHIPPED | OUTPATIENT
Start: 2023-07-31

## 2023-07-31 RX ORDER — VILAZODONE HYDROCHLORIDE 40 MG/1
40 TABLET ORAL DAILY
Qty: 90 TABLET | Refills: 1 | Status: SHIPPED | OUTPATIENT
Start: 2023-07-31

## 2023-07-31 RX ORDER — RIZATRIPTAN BENZOATE 10 MG/1
10 TABLET ORAL ONCE AS NEEDED
Qty: 10 TABLET | Refills: 3 | Status: SHIPPED | OUTPATIENT
Start: 2023-07-31 | End: 2023-08-30

## 2023-08-08 PROCEDURE — 87186 SC STD MICRODIL/AGAR DIL: CPT | Performed by: NURSE PRACTITIONER

## 2023-08-08 PROCEDURE — 87077 CULTURE AEROBIC IDENTIFY: CPT | Performed by: NURSE PRACTITIONER

## 2023-08-08 PROCEDURE — 87205 SMEAR GRAM STAIN: CPT | Performed by: NURSE PRACTITIONER

## 2023-08-08 PROCEDURE — 87070 CULTURE OTHR SPECIMN AEROBIC: CPT | Performed by: NURSE PRACTITIONER

## 2023-08-10 ENCOUNTER — OFFICE VISIT (OUTPATIENT)
Dept: SLEEP MEDICINE | Facility: HOSPITAL | Age: 28
End: 2023-08-10
Payer: COMMERCIAL

## 2023-08-10 VITALS
SYSTOLIC BLOOD PRESSURE: 116 MMHG | HEIGHT: 63 IN | OXYGEN SATURATION: 98 % | DIASTOLIC BLOOD PRESSURE: 68 MMHG | HEART RATE: 111 BPM | BODY MASS INDEX: 36.79 KG/M2 | WEIGHT: 207.6 LBS

## 2023-08-10 DIAGNOSIS — R29.818 SUSPECTED SLEEP APNEA: Primary | ICD-10-CM

## 2023-08-10 DIAGNOSIS — R06.83 SNORING: ICD-10-CM

## 2023-08-10 DIAGNOSIS — G47.19 EXCESSIVE DAYTIME SLEEPINESS: ICD-10-CM

## 2023-08-10 NOTE — PROGRESS NOTES
Chief Complaint:   Chief Complaint   Patient presents with    Sleeping Problem       HPI:    Elisabeth Martinez is a 28 y.o. female here establish care.  Patient has a history of environmental allergies, tachycardia, migraines, prediabetes, obesity, asthma, and suspected sleep apnea.    For the past 2 to 3 years patient has trouble sleeping both going and staying asleep.  She has a tough time getting up in the mornings due to exhaustion.  She has difficulty concentrating at work.  She has snoring, excessive daytime sleepiness, frequent awakenings, morning headaches, nasal allergies and teeth grinding.  She does deny nasal fracture, head injury, hypnagogic hallucinations and sleep paralysis.    During the weekday going to bed anywhere from 1030 to 11 PM and awakening 745 to 8:15 AM.  Weekend going to bed at 11:30 PM to 12 AM getting up anywhere from 830 to 9:30 AM.  She does estimate getting 7 hours of sleep but is up 3 and 4 times nightly to use the restroom.  She does take Benadryl as needed to help with sleep.  She goes to sleep easily within 15 to 20 minutes.  Once or twice a week she will take a 1 to 2-hour nap.  Patient has an [place] score of 10/24.    We did speak today about the consequences of untreated sleep apnea such as hypertension, atrial fibrillation, stroke, early onset dementia and sudden cardiac death.  We also discussed different therapies available to her such as CPAP, MAD, or ENT referral.  Patient verbalizes understanding.    Social history  This is a very pleasant 28-year-old female.  Patient is a  with .  She is a non-smoker and denies illicit substance use.  She will have 1 or 2 alcoholic beverages monthly or less.  Patient will drink 1-2 diet daniel daily with the rest of her intake being water.    Past Medical History:   Diagnosis Date    Anemia     Anxiety     Asthma     Depression     Hypothyroidism     Kidney stone     Migraine     Ovarian cyst     PCOS  (polycystic ovarian syndrome)     Polycystic ovary syndrome          Family History   Problem Relation Age of Onset    Thyroid disease Mother     Hypertension Mother     Seizures Mother     Arthritis Mother     Cancer Father     Heart disease Maternal Grandmother     Stroke Maternal Grandmother     Kidney disease Maternal Grandfather     Cancer Maternal Grandfather     Diabetes Maternal Grandfather     Diabetes Paternal Grandfather      History reviewed. No pertinent surgical history.      Current medications are:   Current Outpatient Medications:     albuterol sulfate  (90 Base) MCG/ACT inhaler, Inhale 2 puffs Every 4 (Four) Hours As Needed for Wheezing., Disp: 18 g, Rfl: 1    cetirizine (zyrTEC) 10 MG tablet, Take 1 tablet by mouth Daily., Disp: 90 tablet, Rfl: 1    fluticasone (FLONASE) 50 MCG/ACT nasal spray, 2 sprays into the nostril(s) as directed by provider Daily., Disp: 33.3 mL, Rfl: 5    hydrOXYzine (ATARAX) 10 MG tablet, TAKE ONE TABLET BY MOUTH EVERY 8 HOURS AS NEEDED FOR ITCHING, Disp: 90 tablet, Rfl: 0    metFORMIN ER (GLUCOPHAGE-XR) 750 MG 24 hr tablet, Take 1 tablet by mouth Daily With Breakfast., Disp: 90 tablet, Rfl: 1    montelukast (SINGULAIR) 10 MG tablet, Take 1 tablet by mouth Every Night. NEEDS APPOINTMENT FOR NEXT REFILL, Disp: 90 tablet, Rfl: 1    multivitamin with minerals tablet tablet, Take 1 tablet by mouth Daily., Disp: , Rfl:     mupirocin (BACTROBAN) 2 % ointment, Apply 1 application  topically to the appropriate area as directed 3 (Three) Times a Day for 7 days., Disp: 21 g, Rfl: 0    rizatriptan (Maxalt) 10 MG tablet, Take 1 tablet by mouth 1 (One) Time As Needed for Migraine for up to 30 days. May repeat in 2 hours if needed, Disp: 10 tablet, Rfl: 3    sulfamethoxazole-trimethoprim (Bactrim DS) 800-160 MG per tablet, Take 1 tablet by mouth 2 (Two) Times a Day for 10 days., Disp: 20 tablet, Rfl: 0    vilazodone (VIIBRYD) 40 MG tablet tablet, Take 1 tablet by mouth daily.,  Disp: 90 tablet, Rfl: 1    vitamin C (ASCORBIC ACID) 250 MG tablet, Take 1 tablet by mouth Daily., Disp: , Rfl:     Vitamin D, Cholecalciferol, 50 MCG (2000 UT) capsule, Take 2 capsules by mouth., Disp: , Rfl:     topiramate (Topamax) 50 MG tablet, Take 1 tablet by mouth 2 (Two) Times a Day for 90 days., Disp: 180 tablet, Rfl: 1.      The patient's relevant past medical, surgical, family and social history were reviewed and updated in Epic as appropriate.       Review of Systems   Constitutional:  Positive for fatigue.   HENT:  Positive for congestion.    Cardiovascular:  Positive for palpitations.   Allergic/Immunologic: Positive for environmental allergies.   Neurological:  Positive for numbness and headaches.   Psychiatric/Behavioral:  Positive for dysphoric mood and sleep disturbance. The patient is nervous/anxious.    All other systems reviewed and are negative.      Objective:    Physical Exam  Constitutional:       Appearance: Normal appearance.   HENT:      Head: Normocephalic and atraumatic.      Mouth/Throat:      Mouth: Mucous membranes are moist.      Pharynx: Oropharynx is clear.      Comments: Mallampati 3 anatomy  Cardiovascular:      Rate and Rhythm: Normal rate and regular rhythm.   Pulmonary:      Effort: Pulmonary effort is normal.      Breath sounds: Normal breath sounds.   Skin:     General: Skin is warm and dry.   Neurological:      Mental Status: She is alert and oriented to person, place, and time.   Psychiatric:         Mood and Affect: Mood normal.         Behavior: Behavior normal.         Thought Content: Thought content normal.         Judgment: Judgment normal.         ASSESSMENT/PLAN    Diagnoses and all orders for this visit:    1. Suspected sleep apnea (Primary)  -     Home Sleep Study; Future    2. Snoring  -     Home Sleep Study; Future    3. Excessive daytime sleepiness  -     Home Sleep Study; Future        Counseled patient regarding multimodal approach with healthy nutrition,  healthy sleep, regular physical activity, social activities, counseling, and medications. Encouraged to practice lateral sleep position. Avoid alcohol and sedatives close to bedtime.      Patient certainly has a strong story for sleep apnea and due to the consequences of sleep apnea that is untreated will move forward with HST and follow-up as appropriate.  I have reviewed the results of my evaluation and impression and discussed my recommendations in detail with the patient.      Signed by  PATRIC Carmichael    August 10, 2023      CC: Zully Kirkland APRN Pacitti, Kelly Craft, DO

## 2023-08-22 DIAGNOSIS — R73.03 PRE-DIABETES: ICD-10-CM

## 2023-08-22 DIAGNOSIS — F41.9 ANXIETY: ICD-10-CM

## 2023-08-23 RX ORDER — HYDROXYZINE HYDROCHLORIDE 10 MG/1
TABLET, FILM COATED ORAL
Qty: 90 TABLET | Refills: 0 | Status: SHIPPED | OUTPATIENT
Start: 2023-08-23

## 2023-08-23 RX ORDER — METFORMIN HYDROCHLORIDE 750 MG/1
TABLET, EXTENDED RELEASE ORAL
Qty: 90 TABLET | Refills: 1 | Status: SHIPPED | OUTPATIENT
Start: 2023-08-23

## 2023-10-28 DIAGNOSIS — F41.9 ANXIETY: ICD-10-CM

## 2023-10-30 RX ORDER — HYDROXYZINE HYDROCHLORIDE 10 MG/1
TABLET, FILM COATED ORAL
Qty: 90 TABLET | Refills: 0 | Status: SHIPPED | OUTPATIENT
Start: 2023-10-30

## 2023-11-01 ENCOUNTER — APPOINTMENT (OUTPATIENT)
Dept: CT IMAGING | Facility: HOSPITAL | Age: 28
End: 2023-11-01
Payer: COMMERCIAL

## 2023-11-01 ENCOUNTER — HOSPITAL ENCOUNTER (EMERGENCY)
Facility: HOSPITAL | Age: 28
Discharge: HOME | End: 2023-11-01
Attending: EMERGENCY MEDICINE | Admitting: EMERGENCY MEDICINE
Payer: COMMERCIAL

## 2023-11-01 VITALS
RESPIRATION RATE: 16 BRPM | SYSTOLIC BLOOD PRESSURE: 158 MMHG | BODY MASS INDEX: 37.21 KG/M2 | HEART RATE: 93 BPM | HEIGHT: 63 IN | TEMPERATURE: 98.3 F | DIASTOLIC BLOOD PRESSURE: 117 MMHG | OXYGEN SATURATION: 100 % | WEIGHT: 210 LBS

## 2023-11-01 DIAGNOSIS — N23 RENAL COLIC: Primary | ICD-10-CM

## 2023-11-01 DIAGNOSIS — N20.1 LEFT URETERAL STONE: ICD-10-CM

## 2023-11-01 LAB
ALBUMIN SERPL-MCNC: 4.7 G/DL (ref 3.5–5.2)
ALBUMIN/GLOB SERPL: 1.7 G/DL
ALP SERPL-CCNC: 114 U/L (ref 39–117)
ALT SERPL W P-5'-P-CCNC: 16 U/L (ref 1–33)
ANION GAP SERPL CALCULATED.3IONS-SCNC: 14 MMOL/L (ref 5–15)
AST SERPL-CCNC: 16 U/L (ref 1–32)
B-HCG UR QL: NEGATIVE
BACTERIA UR QL AUTO: ABNORMAL /HPF
BASOPHILS # BLD AUTO: 0.07 10*3/MM3 (ref 0–0.2)
BASOPHILS NFR BLD AUTO: 0.7 % (ref 0–1.5)
BILIRUB SERPL-MCNC: 0.3 MG/DL (ref 0–1.2)
BILIRUB UR QL STRIP: NEGATIVE
BUN SERPL-MCNC: 10 MG/DL (ref 6–20)
BUN/CREAT SERPL: 13.7 (ref 7–25)
CALCIUM SPEC-SCNC: 9.2 MG/DL (ref 8.6–10.5)
CHLORIDE SERPL-SCNC: 104 MMOL/L (ref 98–107)
CLARITY UR: ABNORMAL
CO2 SERPL-SCNC: 22 MMOL/L (ref 22–29)
COLOR UR: ABNORMAL
CREAT SERPL-MCNC: 0.73 MG/DL (ref 0.57–1)
DEPRECATED RDW RBC AUTO: 42.2 FL (ref 37–54)
EGFRCR SERPLBLD CKD-EPI 2021: 115 ML/MIN/1.73
EOSINOPHIL # BLD AUTO: 0.11 10*3/MM3 (ref 0–0.4)
EOSINOPHIL NFR BLD AUTO: 1.1 % (ref 0.3–6.2)
ERYTHROCYTE [DISTWIDTH] IN BLOOD BY AUTOMATED COUNT: 14.4 % (ref 12.3–15.4)
EXPIRATION DATE: NORMAL
GLOBULIN UR ELPH-MCNC: 2.7 GM/DL
GLUCOSE SERPL-MCNC: 156 MG/DL (ref 65–99)
GLUCOSE UR STRIP-MCNC: NEGATIVE MG/DL
HCT VFR BLD AUTO: 39.5 % (ref 34–46.6)
HGB BLD-MCNC: 12.3 G/DL (ref 12–15.9)
HGB UR QL STRIP.AUTO: ABNORMAL
HOLD SPECIMEN: NORMAL
HYALINE CASTS UR QL AUTO: ABNORMAL /LPF
IMM GRANULOCYTES # BLD AUTO: 0.04 10*3/MM3 (ref 0–0.05)
IMM GRANULOCYTES NFR BLD AUTO: 0.4 % (ref 0–0.5)
INTERNAL NEGATIVE CONTROL: NEGATIVE
INTERNAL POSITIVE CONTROL: POSITIVE
KETONES UR QL STRIP: NEGATIVE
LEUKOCYTE ESTERASE UR QL STRIP.AUTO: ABNORMAL
LIPASE SERPL-CCNC: 27 U/L (ref 13–60)
LYMPHOCYTES # BLD AUTO: 2.48 10*3/MM3 (ref 0.7–3.1)
LYMPHOCYTES NFR BLD AUTO: 24.9 % (ref 19.6–45.3)
Lab: NORMAL
MCH RBC QN AUTO: 25.2 PG (ref 26.6–33)
MCHC RBC AUTO-ENTMCNC: 31.1 G/DL (ref 31.5–35.7)
MCV RBC AUTO: 80.9 FL (ref 79–97)
MONOCYTES # BLD AUTO: 0.46 10*3/MM3 (ref 0.1–0.9)
MONOCYTES NFR BLD AUTO: 4.6 % (ref 5–12)
NEUTROPHILS NFR BLD AUTO: 6.8 10*3/MM3 (ref 1.7–7)
NEUTROPHILS NFR BLD AUTO: 68.3 % (ref 42.7–76)
NITRITE UR QL STRIP: NEGATIVE
NRBC BLD AUTO-RTO: 0 /100 WBC (ref 0–0.2)
PH UR STRIP.AUTO: 7 [PH] (ref 5–8)
PLATELET # BLD AUTO: 275 10*3/MM3 (ref 140–450)
PMV BLD AUTO: 11.6 FL (ref 6–12)
POTASSIUM SERPL-SCNC: 3.6 MMOL/L (ref 3.5–5.2)
PROT SERPL-MCNC: 7.4 G/DL (ref 6–8.5)
PROT UR QL STRIP: ABNORMAL
RBC # BLD AUTO: 4.88 10*6/MM3 (ref 3.77–5.28)
RBC # UR STRIP: ABNORMAL /HPF
REF LAB TEST METHOD: ABNORMAL
SODIUM SERPL-SCNC: 140 MMOL/L (ref 136–145)
SP GR UR STRIP: 1.02 (ref 1–1.03)
SQUAMOUS #/AREA URNS HPF: ABNORMAL /HPF
UROBILINOGEN UR QL STRIP: ABNORMAL
WBC # UR STRIP: ABNORMAL /HPF
WBC NRBC COR # BLD: 9.96 10*3/MM3 (ref 3.4–10.8)
WHOLE BLOOD HOLD COAG: NORMAL
WHOLE BLOOD HOLD SPECIMEN: NORMAL

## 2023-11-01 PROCEDURE — 81025 URINE PREGNANCY TEST: CPT | Performed by: EMERGENCY MEDICINE

## 2023-11-01 PROCEDURE — 74176 CT ABD & PELVIS W/O CONTRAST: CPT

## 2023-11-01 PROCEDURE — 25010000002 KETOROLAC TROMETHAMINE PER 15 MG: Performed by: EMERGENCY MEDICINE

## 2023-11-01 PROCEDURE — 99284 EMERGENCY DEPT VISIT MOD MDM: CPT

## 2023-11-01 PROCEDURE — 80053 COMPREHEN METABOLIC PANEL: CPT | Performed by: EMERGENCY MEDICINE

## 2023-11-01 PROCEDURE — 96374 THER/PROPH/DIAG INJ IV PUSH: CPT

## 2023-11-01 PROCEDURE — 25010000002 ONDANSETRON PER 1 MG: Performed by: EMERGENCY MEDICINE

## 2023-11-01 PROCEDURE — 25810000003 SODIUM CHLORIDE 0.9 % SOLUTION: Performed by: EMERGENCY MEDICINE

## 2023-11-01 PROCEDURE — 83690 ASSAY OF LIPASE: CPT | Performed by: EMERGENCY MEDICINE

## 2023-11-01 PROCEDURE — 85025 COMPLETE CBC W/AUTO DIFF WBC: CPT | Performed by: EMERGENCY MEDICINE

## 2023-11-01 PROCEDURE — 96375 TX/PRO/DX INJ NEW DRUG ADDON: CPT

## 2023-11-01 PROCEDURE — 25010000002 MORPHINE PER 10 MG: Performed by: EMERGENCY MEDICINE

## 2023-11-01 PROCEDURE — 81001 URINALYSIS AUTO W/SCOPE: CPT | Performed by: EMERGENCY MEDICINE

## 2023-11-01 RX ORDER — KETOROLAC TROMETHAMINE 30 MG/ML
15 INJECTION, SOLUTION INTRAMUSCULAR; INTRAVENOUS ONCE
Status: COMPLETED | OUTPATIENT
Start: 2023-11-01 | End: 2023-11-01

## 2023-11-01 RX ORDER — TAMSULOSIN HYDROCHLORIDE 0.4 MG/1
1 CAPSULE ORAL NIGHTLY
Qty: 14 CAPSULE | Refills: 0 | Status: SHIPPED | OUTPATIENT
Start: 2023-11-01

## 2023-11-01 RX ORDER — ONDANSETRON 2 MG/ML
4 INJECTION INTRAMUSCULAR; INTRAVENOUS ONCE
Status: COMPLETED | OUTPATIENT
Start: 2023-11-01 | End: 2023-11-01

## 2023-11-01 RX ORDER — KETOROLAC TROMETHAMINE 10 MG/1
10 TABLET, FILM COATED ORAL EVERY 6 HOURS PRN
Qty: 15 TABLET | Refills: 0 | Status: SHIPPED | OUTPATIENT
Start: 2023-11-01

## 2023-11-01 RX ORDER — ONDANSETRON 4 MG/1
4 TABLET, ORALLY DISINTEGRATING ORAL EVERY 8 HOURS PRN
Qty: 12 TABLET | Refills: 0 | Status: SHIPPED | OUTPATIENT
Start: 2023-11-01

## 2023-11-01 RX ORDER — MORPHINE SULFATE 4 MG/ML
4 INJECTION, SOLUTION INTRAMUSCULAR; INTRAVENOUS ONCE
Status: COMPLETED | OUTPATIENT
Start: 2023-11-01 | End: 2023-11-01

## 2023-11-01 RX ORDER — OXYCODONE HYDROCHLORIDE AND ACETAMINOPHEN 5; 325 MG/1; MG/1
1 TABLET ORAL EVERY 6 HOURS PRN
Qty: 12 TABLET | Refills: 0 | Status: SHIPPED | OUTPATIENT
Start: 2023-11-01

## 2023-11-01 RX ORDER — SODIUM CHLORIDE 9 MG/ML
10 INJECTION INTRAVENOUS AS NEEDED
Status: DISCONTINUED | OUTPATIENT
Start: 2023-11-01 | End: 2023-11-01 | Stop reason: HOSPADM

## 2023-11-01 RX ADMIN — SODIUM CHLORIDE 1000 ML: 9 INJECTION, SOLUTION INTRAVENOUS at 11:28

## 2023-11-01 RX ADMIN — MORPHINE SULFATE 4 MG: 4 INJECTION, SOLUTION INTRAMUSCULAR; INTRAVENOUS at 11:28

## 2023-11-01 RX ADMIN — KETOROLAC TROMETHAMINE 15 MG: 30 INJECTION, SOLUTION INTRAMUSCULAR; INTRAVENOUS at 11:28

## 2023-11-01 RX ADMIN — ONDANSETRON 4 MG: 2 INJECTION INTRAMUSCULAR; INTRAVENOUS at 11:28

## 2023-11-01 NOTE — DISCHARGE INSTRUCTIONS
Return for uncontrolled pain, uncontrolled vomiting, fevers or any Problems or complications.  Follow-up with urology as discussed.

## 2023-11-01 NOTE — Clinical Note
[facility] EMERGENCY DEPARTMENT  [address]  [facility] [address]  Phone: [phone]    Elisabeth Martinez was seen and treated in our emergency department on 11/1/2023.  She may return to work on 11/04/2023.         Thank you for choosing [facility].    Olegario Sommers PA

## 2023-11-01 NOTE — ED PROVIDER NOTES
Subjective   History of Present Illness  28-year-old female presents emergency department today with flank pain.  Patient reports she had kidney stones in the past this seems similar.  She had nausea but no vomiting.  She had no fevers no chills.  She has had kidney stones in the past.    History provided by:  Patient   used: No    Flank Pain  Pain location:  L flank  Pain quality: aching and dull    Pain radiates to:  Groin  Pain severity:  Severe  Onset quality:  Sudden  Duration:  4 hours  Timing:  Constant  Progression:  Waxing and waning  Chronicity:  Recurrent  Context comment:  History of kidney stone  Relieved by:  Nothing  Worsened by:  Nothing  Ineffective treatments:  None tried  Associated symptoms: no anorexia, no chest pain, no constipation, no cough, no dysuria, no fatigue, no fever, no flatus, no hematemesis, no hematochezia, no hematuria and no nausea        Review of Systems   Constitutional:  Negative for fatigue and fever.   Respiratory:  Negative for cough.    Cardiovascular:  Negative for chest pain and palpitations.   Gastrointestinal:  Negative for anorexia, constipation, flatus, hematemesis, hematochezia and nausea.   Genitourinary:  Positive for flank pain. Negative for dysuria, hematuria and urgency.   Psychiatric/Behavioral: Negative.     All other systems reviewed and are negative.      Past Medical History:   Diagnosis Date    Anemia     Anxiety     Asthma     Depression     Hypothyroidism     Kidney stone     Migraine     Ovarian cyst     PCOS (polycystic ovarian syndrome)     Polycystic ovary syndrome        No Known Allergies    No past surgical history on file.    Family History   Problem Relation Age of Onset    Thyroid disease Mother     Hypertension Mother     Seizures Mother     Arthritis Mother     Cancer Father     Heart disease Maternal Grandmother     Stroke Maternal Grandmother     Kidney disease Maternal Grandfather     Cancer Maternal Grandfather      Diabetes Maternal Grandfather     Diabetes Paternal Grandfather        Social History     Socioeconomic History    Marital status:    Tobacco Use    Smoking status: Never    Smokeless tobacco: Never   Vaping Use    Vaping Use: Never used   Substance and Sexual Activity    Alcohol use: Yes     Comment: RARELY    Drug use: Never    Sexual activity: Yes     Partners: Male     Birth control/protection: Condom           Objective   Physical Exam  Vitals and nursing note reviewed.   Constitutional:       Appearance: She is well-developed.      Comments: Diaphoretic appears very uncomfortable   HENT:      Head: Normocephalic and atraumatic.      Right Ear: External ear normal.      Left Ear: External ear normal.      Nose: Nose normal.   Eyes:      General: No scleral icterus.     Conjunctiva/sclera: Conjunctivae normal.   Neck:      Thyroid: No thyromegaly.   Cardiovascular:      Rate and Rhythm: Normal rate and regular rhythm.      Heart sounds: Normal heart sounds.   Pulmonary:      Effort: Pulmonary effort is normal. No respiratory distress.      Breath sounds: Normal breath sounds. No wheezing or rales.   Chest:      Chest wall: No tenderness.   Abdominal:      General: Bowel sounds are normal. There is no distension.      Palpations: Abdomen is soft.      Tenderness: There is no abdominal tenderness.   Musculoskeletal:         General: Normal range of motion.      Cervical back: Normal range of motion.   Lymphadenopathy:      Cervical: No cervical adenopathy.   Skin:     General: Skin is warm and dry.   Neurological:      Mental Status: She is alert and oriented to person, place, and time.      Cranial Nerves: No cranial nerve deficit.      Coordination: Coordination normal.      Deep Tendon Reflexes: Reflexes are normal and symmetric. Reflexes normal.   Psychiatric:         Behavior: Behavior normal.         Thought Content: Thought content normal.         Judgment: Judgment normal.         Procedures      "      ED Course                No results found for this or any previous visit (from the past 24 hour(s)).  Note: In addition to lab results from this visit, the labs listed above may include labs taken at another facility or during a different encounter within the last 24 hours. Please correlate lab times with ED admission and discharge times for further clarification of the services performed during this visit.    CT Abdomen Pelvis Without Contrast   Final Result   Impression:   Obstructing 4 mm left UPJ stone resulting in mild hydronephrosis. Additional punctate left inferior pole calculus noted.                  Electronically Signed: Augustin Bhagat MD     11/1/2023 12:12 PM EDT     Workstation ID: [id number]        Vitals:    11/01/23 1018   BP: (!) 158/117   BP Location: Left arm   Patient Position: Sitting   Pulse: 93   Resp: 16   Temp: 98.3 °F (36.8 °C)   TempSrc: Oral   SpO2: 100%   Weight: 95.3 kg (210 lb)   Height: 160 cm (63\")     Medications   Morphine sulfate (PF) injection 4 mg (4 mg Intravenous Given 11/1/23 1128)   ketorolac (TORADOL) injection 15 mg (15 mg Intravenous Given 11/1/23 1128)   sodium chloride 0.9 % bolus 1,000 mL (0 mL Intravenous Stopped 11/1/23 1304)   ondansetron (ZOFRAN) injection 4 mg (4 mg Intravenous Given 11/1/23 1128)     ECG/EMG Results (last 24 hours)       ** No results found for the last 24 hours. **          No orders to display                                  Medical Decision Making  Problems Addressed:  Left ureteral stone: complicated acute illness or injury  Renal colic: complicated acute illness or injury    Amount and/or Complexity of Data Reviewed  Labs: ordered.  Radiology: ordered.    Risk  Prescription drug management.        Final diagnoses:   Renal colic   Left ureteral stone       ED Disposition  ED Disposition       ED Disposition   Discharge    Condition   Stable    Comment   --               Jamie Dior MD  [facility] [address] " [address]  [phone]      Call for appointment         Medication List        New Prescriptions      ketorolac 10 MG tablet  Commonly known as: TORADOL  Take 1 tablet by mouth Every 6 (Six) Hours As Needed for Moderate Pain.     ondansetron ODT 4 MG disintegrating tablet  Commonly known as: ZOFRAN-ODT  Place 1 tablet on the tongue Every 8 (Eight) Hours As Needed for Nausea.     oxyCODONE-acetaminophen 5-325 MG per tablet  Commonly known as: PERCOCET  Take 1 tablet by mouth Every 6 (Six) Hours As Needed for Severe Pain.     tamsulosin 0.4 MG capsule 24 hr capsule  Commonly known as: FLOMAX  Take 1 capsule by mouth Every Night.               Where to Get Your Medications        These medications were sent to [facility] [id number] - [place] [id number] [phone] [place] [phone] [place] [address]      Phone: [phone]   ketorolac 10 MG tablet  ondansetron ODT 4 MG disintegrating tablet  oxyCODONE-acetaminophen 5-325 MG per tablet  tamsulosin 0.4 MG capsule 24 hr capsule            Olegario Sommers PA  11/04/23 3043

## 2023-11-07 ENCOUNTER — OFFICE VISIT (OUTPATIENT)
Dept: NEUROLOGY | Facility: CLINIC | Age: 28
End: 2023-11-07
Payer: COMMERCIAL

## 2023-11-07 VITALS
SYSTOLIC BLOOD PRESSURE: 126 MMHG | OXYGEN SATURATION: 99 % | BODY MASS INDEX: 37.21 KG/M2 | HEART RATE: 100 BPM | HEIGHT: 63 IN | WEIGHT: 210 LBS | DIASTOLIC BLOOD PRESSURE: 88 MMHG

## 2023-11-07 DIAGNOSIS — G43.019 INTRACTABLE MIGRAINE WITHOUT AURA AND WITHOUT STATUS MIGRAINOSUS: Primary | ICD-10-CM

## 2023-11-07 PROCEDURE — 99214 OFFICE O/P EST MOD 30 MIN: CPT | Performed by: PSYCHIATRY & NEUROLOGY

## 2023-11-07 RX ORDER — RIZATRIPTAN BENZOATE 10 MG/1
10 TABLET ORAL ONCE AS NEEDED
Qty: 10 TABLET | Refills: 3 | Status: SHIPPED | OUTPATIENT
Start: 2023-11-07 | End: 2023-12-07

## 2023-11-07 RX ORDER — TOPIRAMATE 50 MG/1
75 TABLET, FILM COATED ORAL 2 TIMES DAILY
Qty: 90 TABLET | Refills: 6 | Status: SHIPPED | OUTPATIENT
Start: 2023-11-07 | End: 2023-12-07

## 2023-11-07 NOTE — PROGRESS NOTES
Subjective:    CC: Elisabeth Martinez is in clinic today for follow up for history of migraines.    HPI:  Follow-up: 7/10/2019: She is in clinic for regular follow-up.  Since her last visit, her insurance did approve Aimovig 140 mg subcutaneous injection.  She has done total of 2 injections since her last visit and reports that she is doing really well.  She has had only one headache in the last 2 months for recheck to take sumatriptan.  She is tolerating Aimovig well without any side effects.    Follow-up: 1/4/2021: She is in clinic for follow-up after July 2019.  She reports that she ran out of her Aimovig in August 2020 and her primary care physician's office could not refill it so since then she is not taking.  Since running out of Aimovig in August, she reports that the migraine intensity and frequency have increased and currently she is experiencing 8-9 breakthrough migraine in a month.  While on Aimovig, it would reduce to 1-2 migraines in a month.  She is currently taking sumatriptan 100 mg as needed as an abortive treatment and it is working well.  She wants to go back on Aimovig.    5/6/2021: She is in clinic for regular follow-up.  Since her last visit in January, she has now restarted Aimovig monthly injections and have done total 3 injections.  With Aimovig, she has again had excellent response and the migraine intensity and frequency has reduced to 1-2 migraines in a month.  She is using Imitrex 100 mg as needed as an abortive treatment and it works well.    2/2/2022: She is in clinic for regular follow-up.  Since her last visit in May 2021, she reports that after August 2021, insurance stopped covering Aimovig so she has not done any more injections since August.  She reports that the migraines have increased in frequency and intensity while with on average 10-12 migraine days in a month.  She is taking sumatriptan frequently and possibly has developed a rebound headaches as well.  She reports that  Imitrex does work well as an abortive treatment.    7/20/2022: She is in clinic for regular follow-up.  Since her last visit in February 2022, she reports that insurance approved Ajovy until May 2022 after which she is not getting it anymore.  Ajovy helped her significantly in keeping migraines under good control with on an average migraine frequency of 3-4 migraine days in a month.  However since May, it has increased to 10-14 migraine days in a month.  She has not been able to get Ajovy since then.    9/14/2022: She is in clinic for regular follow-up.  Since her last visit in July 2022, she has been taking Topamax 50 mg twice daily as insurance denied Ajovy and she reports that she has had excellent response.  She is reporting 1-2 breakthrough migraines in a month with Topamax use.  She has experienced some numbness and tingling involving both her hands and feet but is tolerable.  She is currently using Imitrex as needed as an abortive treatment but many times after the migraine gets better after taking Imitrex, she will get pain in the neck and shoulder area.     5/11/2023: She is in clinic for regular follow-up.  Since her last visit in September 2022, she reports that except for 1 bad week in January, she has done really well and migraines remain under excellent control with on an average 1-2 breakthrough migraines in a month responding well to Maxalt as an abortive treatment.  She denies any side effects with this combination.    11/7/2023: She is in clinic for regular follow-up.  Since her last visit in May 2023, she has noticed increase in migraine frequency.  She is currently experiencing 2 breakthrough migraines in a week.  She has also noticed increase in frequency and intensity during menstrual cycle.  She has been taking Topamax 50 mg twice daily and is tolerating it well without any side effects.  She reports that Maxalt works really well as an abortive treatment      The following data were reviewed  and updated as of 11/07/2023: allergies, social history, and problem list.       Current Outpatient Medications:     albuterol sulfate  (90 Base) MCG/ACT inhaler, Inhale 2 puffs Every 4 (Four) Hours As Needed for Wheezing., Disp: 18 g, Rfl: 1    cetirizine (zyrTEC) 10 MG tablet, Take 1 tablet by mouth Daily., Disp: 90 tablet, Rfl: 1    fluticasone (FLONASE) 50 MCG/ACT nasal spray, 2 sprays into the nostril(s) as directed by provider Daily., Disp: 33.3 mL, Rfl: 5    hydrOXYzine (ATARAX) 10 MG tablet, TAKE ONE TABLET BY MOUTH EVERY 8 HOURS AS NEEDED FOR ITCHING, Disp: 90 tablet, Rfl: 0    ketorolac (TORADOL) 10 MG tablet, Take 1 tablet by mouth Every 6 (Six) Hours As Needed for Moderate Pain., Disp: 15 tablet, Rfl: 0    metFORMIN ER (GLUCOPHAGE-XR) 750 MG 24 hr tablet, TAKE ONE TABLET BY MOUTH DAILY WITH BREAKFAST, Disp: 90 tablet, Rfl: 1    montelukast (SINGULAIR) 10 MG tablet, Take 1 tablet by mouth Every Night. NEEDS APPOINTMENT FOR NEXT REFILL, Disp: 90 tablet, Rfl: 1    multivitamin with minerals tablet tablet, Take 1 tablet by mouth Daily., Disp: , Rfl:     ondansetron ODT (ZOFRAN-ODT) 4 MG disintegrating tablet, Place 1 tablet on the tongue Every 8 (Eight) Hours As Needed for Nausea., Disp: 12 tablet, Rfl: 0    oxyCODONE-acetaminophen (PERCOCET) 5-325 MG per tablet, Take 1 tablet by mouth Every 6 (Six) Hours As Needed for Severe Pain., Disp: 12 tablet, Rfl: 0    rizatriptan (Maxalt) 10 MG tablet, Take 1 tablet by mouth 1 (One) Time As Needed for Migraine for up to 30 days. May repeat in 2 hours if needed, Disp: 10 tablet, Rfl: 3    tamsulosin (FLOMAX) 0.4 MG capsule 24 hr capsule, Take 1 capsule by mouth Every Night., Disp: 14 capsule, Rfl: 0    topiramate (Topamax) 50 MG tablet, Take 1.5 tablets by mouth 2 (Two) Times a Day for 30 days., Disp: 90 tablet, Rfl: 6    vilazodone (VIIBRYD) 40 MG tablet tablet, Take 1 tablet by mouth daily., Disp: 90 tablet, Rfl: 1    vitamin C (ASCORBIC ACID) 250 MG tablet,  "Take 1 tablet by mouth Daily., Disp: , Rfl:     Vitamin D, Cholecalciferol, 50 MCG (2000 UT) capsule, Take 2 capsules by mouth., Disp: , Rfl:    Past Medical History:   Diagnosis Date    Anemia     Anxiety     Asthma     Depression     Hypothyroidism     Kidney stone     Migraine     Ovarian cyst     PCOS (polycystic ovarian syndrome)     Polycystic ovary syndrome       No past surgical history on file.   Family History   Problem Relation Age of Onset    Thyroid disease Mother     Hypertension Mother     Seizures Mother     Arthritis Mother     Cancer Father     Heart disease Maternal Grandmother     Stroke Maternal Grandmother     Kidney disease Maternal Grandfather     Cancer Maternal Grandfather     Diabetes Maternal Grandfather     Diabetes Paternal Grandfather         Review of Systems  Objective:    /88   Pulse 100   Ht 160 cm (62.99\")   Wt 95.3 kg (210 lb)   SpO2 99%   BMI 37.21 kg/m²     Neurology Exam:  General apperance: NAD.     Mental status: Alert, awake and oriented to time place and person.    Language and Speech: No aphasia or dysarthria.    CN II to XII: Intact.    Opthalmoscopic Exam: No papilledema.    Motor:  Right UE muscle strength 5/5. Normal tone.     Left UE muscle strength 5/5. Normal tone.      Right LE muscle strength 5/5. Normal tone.     Left LE muscle strength 5/5. Normal tone.      Sensory: Normal light touch, vibration and pinprick sensation bilaterally.    DTRs: 2+ bilaterally.    Babinski: Negative bilaterally.    Co-ordination: Normal finger-to-nose, heel to shin B/L.    Rhomberg: Negative.    Gait: Normal.    Cardiovascular: Regular rate and rhythm without murmur, gallop or rub.    Assessment and Plan:  1. Intractable migraine without aura and without status migrainosus  She has noticed increase in migraine frequency with on an average 2 migraines occurring in a week.  She has also noticed that during menstrual cycles they seem to be worse in intensity and frequency as " well.  Since she is does not have any side effects with Topamax, I will increase her Topamax to 75 milligram twice daily for better therapeutic effect that we will continue with Maxalt as needed as an abortive treatment.  I have advised her to call office with response in 3 weeks otherwise, I will see her back in clinic in 6 months for follow-up.    I spent 30 minutes in patient care: Reviewing records prior to the visit, entering orders and documentation and spent more than calvo 50% of this time face-to-face in management, instructions and education regarding above mentioned diagnosis and also on counseling and discussing about taking medication regularly, possible side effects with medication use, importance of good sleep hygiene, good hydration and regular exercise.    Return in about 6 months (around 5/7/2024).       Note to patient: The 21st Century Cures Act makes medical notes like these available to patients in the interest of transparency. However, be advised this is a medical document. It is intended as peer to peer communication. It is written in medical language and may contain abbreviations or verbiage that are unfamiliar. It may appear blunt or direct. Medical documents are intended to carry relevant information, facts as evident, and the clinical opinion of the physician.

## 2023-11-08 DIAGNOSIS — F32.1 MODERATE MAJOR DEPRESSION: ICD-10-CM

## 2023-11-08 RX ORDER — VILAZODONE HYDROCHLORIDE 40 MG/1
40 TABLET ORAL DAILY
Qty: 90 TABLET | Refills: 1 | Status: SHIPPED | OUTPATIENT
Start: 2023-11-08

## 2023-11-08 NOTE — TELEPHONE ENCOUNTER
Caller: Elisabeth Martinez    Relationship: Self    Best call back number: [phone]     Requested Prescriptions:   Requested Prescriptions     Pending Prescriptions Disp Refills    vilazodone (VIIBRYD) 40 MG tablet tablet 90 tablet 1     Sig: Take 1 tablet by mouth Daily.        Pharmacy where request should be sent: [facility] [id number] [facility] [address] [facility] AT [address] [phone] [facility] [phone]      Last office visit with prescribing clinician: 3/13/2023   Last telemedicine visit with prescribing clinician: Visit date not found   Next office visit with prescribing clinician: Visit date not found     Does the patient have less than a 3 day supply:  [x] Yes  [] No    Would you like a call back once the refill request has been completed: [x] Yes [] No    If the office needs to give you a call back, can they leave a voicemail: [x] Yes [] No    Angela Bradford Rep   11/08/23 13:40 EST

## 2024-01-04 ENCOUNTER — OFFICE VISIT (OUTPATIENT)
Dept: INTERNAL MEDICINE | Facility: CLINIC | Age: 29
End: 2024-01-04
Payer: COMMERCIAL

## 2024-01-04 ENCOUNTER — TELEPHONE (OUTPATIENT)
Dept: INTERNAL MEDICINE | Facility: CLINIC | Age: 29
End: 2024-01-04

## 2024-01-04 VITALS
DIASTOLIC BLOOD PRESSURE: 70 MMHG | TEMPERATURE: 97.5 F | HEART RATE: 78 BPM | SYSTOLIC BLOOD PRESSURE: 110 MMHG | BODY MASS INDEX: 37.21 KG/M2 | WEIGHT: 210 LBS | RESPIRATION RATE: 18 BRPM | HEIGHT: 63 IN

## 2024-01-04 DIAGNOSIS — J02.9 SORE THROAT: ICD-10-CM

## 2024-01-04 DIAGNOSIS — J32.9 SINUSITIS, UNSPECIFIED CHRONICITY, UNSPECIFIED LOCATION: Primary | ICD-10-CM

## 2024-01-04 LAB
EXPIRATION DATE: ABNORMAL
EXPIRATION DATE: NORMAL
FLUAV AG UPPER RESP QL IA.RAPID: NOT DETECTED
FLUBV AG UPPER RESP QL IA.RAPID: NOT DETECTED
INTERNAL CONTROL: ABNORMAL
INTERNAL CONTROL: NORMAL
Lab: ABNORMAL
Lab: NORMAL
S PYO AG THROAT QL: NEGATIVE
SARS-COV-2 AG UPPER RESP QL IA.RAPID: NOT DETECTED

## 2024-01-04 PROCEDURE — 87428 SARSCOV & INF VIR A&B AG IA: CPT | Performed by: NURSE PRACTITIONER

## 2024-01-04 PROCEDURE — 99213 OFFICE O/P EST LOW 20 MIN: CPT | Performed by: NURSE PRACTITIONER

## 2024-01-04 PROCEDURE — 87880 STREP A ASSAY W/OPTIC: CPT | Performed by: NURSE PRACTITIONER

## 2024-01-04 RX ORDER — METHYLPREDNISOLONE 4 MG/1
TABLET ORAL
Qty: 21 TABLET | Refills: 0 | Status: SHIPPED | OUTPATIENT
Start: 2024-01-04

## 2024-01-04 RX ORDER — AZITHROMYCIN 250 MG/1
TABLET, FILM COATED ORAL
Qty: 6 TABLET | Refills: 0 | Status: SHIPPED | OUTPATIENT
Start: 2024-01-04

## 2024-01-04 NOTE — TELEPHONE ENCOUNTER
Name: Elisabeth Martinez      Relationship: Self      Best Callback Number: [phone]       HUB PROVIDED THE RELAY MESSAGE FROM THE OFFICE      PATIENT: VOICED UNDERSTANDING AND HAS NO FURTHER QUESTIONS AT THIS TIME    ADDITIONAL INFORMATION:

## 2024-01-04 NOTE — TELEPHONE ENCOUNTER
Attempted to call patient , left voice message for patient to return call.    HUB TO RELAY:  Please inform patient that covid/flu and strep test were negative.

## 2024-01-04 NOTE — PROGRESS NOTES
"Chief Complaint  Sore Throat (X3 days. ), Ear Fullness (X2 days.), and Cough    Subjective          Elisabeth Martinez presents to [facility]  Sore Throat   Associated symptoms include coughing and a plugged ear sensation.   Ear Fullness   Associated symptoms include coughing and a sore throat.   Cough  Associated symptoms include a sore throat.     The patient presents today for evaluation of a sore throat.    Sore throat  The patient has been experiencing a sore throat for approximately 3 days. She has been coughing up green mucus. Her ears feel like she is in the mountains. She has not been taking any over-the-counter medications for her symptoms. She has severe allergies and takes Singulair, Zyrtec, and Flonase daily. The patient consulted an allergist in [place] approximately 4 years ago and was told to use Flonase daily. She has taken amoxicillin in the past for strep throat. If her throat is sinus related, she is usually put on a Z-Anuj. If her ears have fluid, prednisone has been effective in the past. The patient is not coughing.    Objective   Vital Signs:   /70 (BP Location: Right arm, Patient Position: Sitting, Cuff Size: Adult)   Pulse 78   Temp 97.5 °F (36.4 °C) (Infrared)   Resp 18   Ht 160 cm (62.99\")   Wt 95.3 kg (210 lb)   BMI 37.21 kg/m²     Physical Exam  Vitals reviewed.   Constitutional:       Appearance: Normal appearance. She is well-developed.   HENT:      Head: Normocephalic and atraumatic.      Nose:      Comments: Mucosa boggy  Eyes:      General: Lids are normal.      Conjunctiva/sclera: Conjunctivae normal.      Pupils: Pupils are equal, round, and reactive to light.   Neck:      Thyroid: No thyromegaly.      Trachea: Trachea normal.   Cardiovascular:      Rate and Rhythm: Normal rate and regular rhythm.      Heart sounds: Normal heart sounds.   Pulmonary:      Effort: Pulmonary effort is normal. No respiratory distress.     "  Breath sounds: Normal breath sounds.   Skin:     General: Skin is warm and dry.   Neurological:      Mental Status: She is alert and oriented to person, place, and time.      GCS: GCS eye subscore is 4. GCS verbal subscore is 5. GCS motor subscore is 6.   Psychiatric:         Attention and Perception: Attention normal.         Mood and Affect: Mood normal.         Speech: Speech normal.         Behavior: Behavior normal. Behavior is cooperative.         Thought Content: Thought content normal.        Result Review :                 Assessment and Plan    Diagnoses and all orders for this visit:    1. Sinusitis, unspecified chronicity, unspecified location (Primary)  -     azithromycin (Zithromax Z-Anuj) 250 MG tablet; Take 2 tablets the first day, then 1 tablet daily for 4 days.  Dispense: 6 tablet; Refill: 0  -     methylPREDNISolone (MEDROL) 4 MG dose pack; Take as directed on package instructions.  Dispense: 21 tablet; Refill: 0    2. Sore throat  -     POCT SARS-CoV-2 + Flu Antigen RADHA  -     POC Rapid Strep A    Acute non-recurrent maxillary sinusitis   - POC SARS-CoV-2 Antigen RADHA  - A Z-Anuj and steroids will be prescribed.    Sore throat  - POC SARS-COV-2 Antigen RADHA negative        Follow Up   Return 4/1/24 [place], for Annual, fasting.  Patient was given instructions and counseling regarding her condition or for health maintenance advice. Please see specific information pulled into the AVS if appropriate.     RTC/call  If symptoms worsen  Meds MOA and SE's reviewed and pt v/u    Transcribed from ambient dictation for PATRIC Carrasquillo by Terri Randall.  01/04/24   12:25 EST    Patient or patient representative verbalized consent to the visit recording.  I have personally performed the services described in this document as transcribed by the above individual, and it is both accurate and complete.

## 2024-01-07 DIAGNOSIS — F41.9 ANXIETY: ICD-10-CM

## 2024-01-08 NOTE — TELEPHONE ENCOUNTER
LOV 01/04/2024  NOV     Left message on machine for patient to call    Relay the following information:    Patient needs to schedule an appointment for additional refills.  Please advise patient she needs to schedule and keep her appointment to continue to receive refills in the future.  If she is unable to keep her appointment we will not be able to provider further refills.  Once appointment has been scheduled please update message with date and time so we can process the request.  We will forward the message to the provider to review the refill request.

## 2024-01-09 NOTE — TELEPHONE ENCOUNTER
2nd attempt     LOV 01/04/2024  NOV      Left message on machine for patient to call     Relay the following information:     Patient needs to schedule an appointment for additional refills.  Please advise patient she needs to schedule and keep her appointment to continue to receive refills in the future.  If she is unable to keep her appointment we will not be able to provider further refills.  Once appointment has been scheduled please update message with date and time so we can process the request.  We will forward the message to the provider to review the refill request.

## 2024-01-10 RX ORDER — HYDROXYZINE HYDROCHLORIDE 10 MG/1
TABLET, FILM COATED ORAL
Qty: 90 TABLET | Refills: 0 | Status: SHIPPED | OUTPATIENT
Start: 2024-01-10

## 2024-01-10 RX ORDER — TOPIRAMATE 50 MG/1
50 TABLET, FILM COATED ORAL 2 TIMES DAILY
Qty: 60 TABLET | OUTPATIENT
Start: 2024-01-10

## 2024-01-10 NOTE — TELEPHONE ENCOUNTER
3rd attempt     LOV 01/04/2024  NOV      Left message on machine for patient to call     Relay the following information:     Patient needs to schedule an appointment for additional refills.  Please advise patient she needs to schedule and keep her appointment to continue to receive refills in the future.  If she is unable to keep her appointment we will not be able to provider further refills.  Once appointment has been scheduled please update message with date and time so we can process the request.  We will forward the message to the provider to review the refill request.

## 2024-02-06 ENCOUNTER — OFFICE VISIT (OUTPATIENT)
Dept: INTERNAL MEDICINE | Facility: CLINIC | Age: 29
End: 2024-02-06
Payer: COMMERCIAL

## 2024-02-06 ENCOUNTER — HOSPITAL ENCOUNTER (OUTPATIENT)
Dept: GENERAL RADIOLOGY | Facility: HOSPITAL | Age: 29
Discharge: HOME | End: 2024-02-06
Admitting: NURSE PRACTITIONER
Payer: COMMERCIAL

## 2024-02-06 VITALS
HEART RATE: 70 BPM | TEMPERATURE: 97.8 F | RESPIRATION RATE: 18 BRPM | HEIGHT: 63 IN | WEIGHT: 212 LBS | BODY MASS INDEX: 37.56 KG/M2 | SYSTOLIC BLOOD PRESSURE: 120 MMHG | DIASTOLIC BLOOD PRESSURE: 82 MMHG

## 2024-02-06 DIAGNOSIS — R19.7 DIARRHEA, UNSPECIFIED TYPE: ICD-10-CM

## 2024-02-06 DIAGNOSIS — R73.03 PREDIABETES: Primary | ICD-10-CM

## 2024-02-06 DIAGNOSIS — R10.12 LUQ PAIN: ICD-10-CM

## 2024-02-06 DIAGNOSIS — E78.5 HYPERLIPIDEMIA, UNSPECIFIED HYPERLIPIDEMIA TYPE: ICD-10-CM

## 2024-02-06 LAB
BILIRUB BLD-MCNC: NEGATIVE MG/DL
CLARITY, POC: CLEAR
COLOR UR: YELLOW
EXPIRATION DATE: ABNORMAL
GLUCOSE UR STRIP-MCNC: NEGATIVE MG/DL
KETONES UR QL: ABNORMAL
LEUKOCYTE EST, POC: NEGATIVE
Lab: ABNORMAL
NITRITE UR-MCNC: NEGATIVE MG/ML
PH UR: 7 [PH] (ref 5–8)
PROT UR STRIP-MCNC: NEGATIVE MG/DL
RBC # UR STRIP: NEGATIVE /UL
SP GR UR: 1.01 (ref 1–1.03)
UROBILINOGEN UR QL: NORMAL

## 2024-02-06 PROCEDURE — 99214 OFFICE O/P EST MOD 30 MIN: CPT | Performed by: NURSE PRACTITIONER

## 2024-02-06 PROCEDURE — 85025 COMPLETE CBC W/AUTO DIFF WBC: CPT | Performed by: NURSE PRACTITIONER

## 2024-02-06 PROCEDURE — 81003 URINALYSIS AUTO W/O SCOPE: CPT | Performed by: NURSE PRACTITIONER

## 2024-02-06 PROCEDURE — 74018 RADEX ABDOMEN 1 VIEW: CPT

## 2024-02-06 PROCEDURE — 80053 COMPREHEN METABOLIC PANEL: CPT | Performed by: NURSE PRACTITIONER

## 2024-02-06 RX ORDER — SEMAGLUTIDE 0.68 MG/ML
0.25 INJECTION, SOLUTION SUBCUTANEOUS WEEKLY
Qty: 2 ML | Refills: 0 | Status: SHIPPED | OUTPATIENT
Start: 2024-02-06 | End: 2024-02-28

## 2024-02-06 NOTE — PROGRESS NOTES
"Chief Complaint  Diarrhea (\"Over 6 months\" ) and Pain (Left side. X1 month. )    Subjective          Elisabeth Martinez presents to [facility]  History of Present Illness    The patient presents today with complaints of diarrhea and hematochezia.    The patient has been experiencing diarrhea for approximately 6 months. She is unsure if this is related to her metformin. The patient has been on metformin since 2018. Her metformin was not increased or changed. She denies any fever, chills, or change in appetite. The patient denies any hematuria. She has a history of kidney stones on the left side. The patient did not have any blood in her stool until last week. She denies any family history of Crohn's disease, inflammatory bowel disease, ulcerative colitis, or colon cancer. The patient has diarrhea intermittently every day. She denies any constipation. The patient's diarrhea occurs mainly after she eats, but sometimes it happens 2 to 3 times a day. She still has her gallbladder.    The patient is currently taking metformin 850 mg once a day for PCOS and prediabetes mellitus. She feels like it does not do anything for her. The patient's last hemoglobin A1c was 6.2 percent on 06/23/2023.    The patient was going to an endocrinologist, but she completely changed her diet cut out bread and sugars and she patient still cannot lose weight.    The patient is not treated for blood pressure or cholesterol. Her last cholesterol check was in 06/2023. Her triglycerides were elevated.    The patient has a history of renal calculus. It has not felt like the pain similar to a renal calculus, but it is in the same area. The patient normally has back pain, but it has not been hurting.      Current Outpatient Medications:     albuterol sulfate  (90 Base) MCG/ACT inhaler, Inhale 2 puffs Every 4 (Four) Hours As Needed for Wheezing., Disp: 18 g, Rfl: 1    cetirizine (zyrTEC) 10 MG " tablet, Take 1 tablet by mouth Daily., Disp: 90 tablet, Rfl: 1    fluticasone (FLONASE) 50 MCG/ACT nasal spray, 2 sprays into the nostril(s) as directed by provider Daily., Disp: 33.3 mL, Rfl: 5    hydrOXYzine (ATARAX) 10 MG tablet, TAKE ONE TABLET BY MOUTH EVERY 8 HOURS AS NEEDED FOR ITCHING, Disp: 90 tablet, Rfl: 0    ketorolac (TORADOL) 10 MG tablet, Take 1 tablet by mouth Every 6 (Six) Hours As Needed for Moderate Pain., Disp: 15 tablet, Rfl: 0    metFORMIN ER (GLUCOPHAGE-XR) 750 MG 24 hr tablet, TAKE ONE TABLET BY MOUTH DAILY WITH BREAKFAST, Disp: 90 tablet, Rfl: 1    methylPREDNISolone (MEDROL) 4 MG dose pack, Take as directed on package instructions., Disp: 21 tablet, Rfl: 0    montelukast (SINGULAIR) 10 MG tablet, Take 1 tablet by mouth Every Night. NEEDS APPOINTMENT FOR NEXT REFILL, Disp: 90 tablet, Rfl: 1    multivitamin with minerals tablet tablet, Take 1 tablet by mouth Daily., Disp: , Rfl:     ondansetron ODT (ZOFRAN-ODT) 4 MG disintegrating tablet, Place 1 tablet on the tongue Every 8 (Eight) Hours As Needed for Nausea., Disp: 12 tablet, Rfl: 0    oxyCODONE-acetaminophen (PERCOCET) 5-325 MG per tablet, Take 1 tablet by mouth Every 6 (Six) Hours As Needed for Severe Pain., Disp: 12 tablet, Rfl: 0    tamsulosin (FLOMAX) 0.4 MG capsule 24 hr capsule, Take 1 capsule by mouth Every Night., Disp: 14 capsule, Rfl: 0    vilazodone (VIIBRYD) 40 MG tablet tablet, Take 1 tablet by mouth Daily., Disp: 90 tablet, Rfl: 1    vitamin C (ASCORBIC ACID) 250 MG tablet, Take 1 tablet by mouth Daily., Disp: , Rfl:     Vitamin D, Cholecalciferol, 50 MCG (2000 UT) capsule, Take 2 capsules by mouth., Disp: , Rfl:     rizatriptan (Maxalt) 10 MG tablet, Take 1 tablet by mouth 1 (One) Time As Needed for Migraine for up to 30 days. May repeat in 2 hours if needed, Disp: 10 tablet, Rfl: 3    Semaglutide,0.25 or 0.5MG/DOS, (Ozempic, 0.25 or 0.5 MG/DOSE,) 2 MG/3ML solution pen-injector, Inject 0.25 mg under the skin into the  "appropriate area as directed 1 (One) Time Per Week for 4 doses., Disp: 2 mL, Rfl: 0    SITagliptin (Januvia) 50 MG tablet, Take 1 tablet by mouth Daily., Disp: 30 tablet, Rfl: 0    topiramate (Topamax) 50 MG tablet, Take 1.5 tablets by mouth 2 (Two) Times a Day for 30 days., Disp: 90 tablet, Rfl: 6     Review of Systems   Constitutional:  Negative for chills, fatigue and fever.   HENT:  Negative for congestion, ear pain and sinus pressure.    Respiratory:  Negative for cough, chest tightness, shortness of breath and wheezing.    Cardiovascular:  Negative for chest pain and palpitations.   Gastrointestinal:  Positive for diarrhea. Negative for abdominal pain, blood in stool and constipation.   Skin:  Negative for color change.   Allergic/Immunologic: Negative for environmental allergies.   Neurological:  Negative for dizziness, speech difficulty and headaches.   Psychiatric/Behavioral:  Negative for confusion. The patient is not nervous/anxious.         Objective   Vital Signs:   /82 (BP Location: Right arm, Patient Position: Sitting, Cuff Size: Large Adult)   Pulse 70   Temp 97.8 °F (36.6 °C) (Infrared)   Resp 18   Ht 160 cm (62.99\")   Wt 96.2 kg (212 lb)   BMI 37.57 kg/m²     Physical Exam  Vitals reviewed.   Constitutional:       Appearance: Normal appearance. She is well-developed.   HENT:      Head: Normocephalic and atraumatic.      Nose: Nose normal.   Eyes:      General: Lids are normal.      Conjunctiva/sclera: Conjunctivae normal.      Pupils: Pupils are equal, round, and reactive to light.   Neck:      Thyroid: No thyromegaly.      Trachea: Trachea normal.   Cardiovascular:      Rate and Rhythm: Normal rate and regular rhythm.      Heart sounds: Normal heart sounds.   Pulmonary:      Effort: Pulmonary effort is normal. No respiratory distress.      Breath sounds: Normal breath sounds.   Skin:     General: Skin is warm and dry.   Neurological:      Mental Status: She is alert and oriented to " person, place, and time.   Psychiatric:         Attention and Perception: Attention normal.         Mood and Affect: Mood normal.         Speech: Speech normal.         Behavior: Behavior normal. Behavior is cooperative.         Thought Content: Thought content normal.        Result Review :                 Assessment and Plan    Diagnoses and all orders for this visit:    1. Prediabetes (Primary)  -     SITagliptin (Januvia) 50 MG tablet; Take 1 tablet by mouth Daily.  Dispense: 30 tablet; Refill: 0  -     Semaglutide,0.25 or 0.5MG/DOS, (Ozempic, 0.25 or 0.5 MG/DOSE,) 2 MG/3ML solution pen-injector; Inject 0.25 mg under the skin into the appropriate area as directed 1 (One) Time Per Week for 4 doses.  Dispense: 2 mL; Refill: 0    2. Hyperlipidemia, unspecified hyperlipidemia type  -     Semaglutide,0.25 or 0.5MG/DOS, (Ozempic, 0.25 or 0.5 MG/DOSE,) 2 MG/3ML solution pen-injector; Inject 0.25 mg under the skin into the appropriate area as directed 1 (One) Time Per Week for 4 doses.  Dispense: 2 mL; Refill: 0    3. LUQ pain  -     XR Abdomen KUB; Future  -     Cancel: CBC w AUTO Differential; Future  -     Comprehensive Metabolic Panel; Future  -     POC Urinalysis Dipstick, Automated; Future  -     CBC w AUTO Differential; Future  -     CBC w AUTO Differential  -     Comprehensive Metabolic Panel  -     POC Urinalysis Dipstick, Automated    4. Diarrhea, unspecified type  -     XR Abdomen KUB; Future  -     Cancel: CBC w AUTO Differential; Future  -     Comprehensive Metabolic Panel; Future  -     POC Urinalysis Dipstick, Automated; Future  -     CBC w AUTO Differential; Future  -     CBC w AUTO Differential  -     Comprehensive Metabolic Panel  -     POC Urinalysis Dipstick, Automated    Diarrhea  - X-ray of abdomen 2 Views will be obtained.  - Ambulatory Referral to Gastroenterology.    Polycystic ovarian syndrome  - Ambulatory Referral to Gastroenterology.    Hyperlipidemia, unspecified hyperlipidemia type  -  Omega-3 fatty acids 1 tablet twice daily.  - Discontinue metformin.  - Start low-dose Januvia 1 tablet daily.  She will let me know if diarrhea persist.  Diarrhea may be related to metformin.  - Start Ozempic 0.25 mg once weekly. Information regarding Ozempic mechanism of action side effect and risk benefit are reviewed with the patient patient verbalizes understanding of family history of thyroid cancer.  - If Ozempic is not approved, she will discontinue the Januvia.    Left flank pain  - X-ray abdomen 2 views.-Per my read: No visible calculus.  Await formal reading call the patient.  Urinalysis without hematuria.          Class 2 Severe Obesity (BMI >=35 and <=39.9). Obesity-related health conditions include the following: diabetes mellitus. Obesity is worsening. BMI is is above average; BMI management plan is completed. We discussed portion control and increasing exercise.            A1C:   Lab Results   Component Value Date    HGBA1C 6.1 06/26/2023          Follow Up   No follow-ups on file.  Patient was given instructions and counseling regarding her condition or for health maintenance advice. Please see specific information pulled into the AVS if appropriate.     RTC/call  If symptoms worsen  Meds MOA and SE's reviewed and pt v/u    PATRIC Carrasquillo  [facility]  [address] [address]  Fax [phone]  Phone [phone]    Transcribed from ambient dictation for PATRIC Carrasquillo by Terri Randall.  02/06/24   13:43 EST    Patient or patient representative verbalized consent to the visit recording.  I have personally performed the services described in this document as transcribed by the above individual, and it is both accurate and complete.

## 2024-02-07 ENCOUNTER — TELEPHONE (OUTPATIENT)
Dept: INTERNAL MEDICINE | Facility: CLINIC | Age: 29
End: 2024-02-07
Payer: COMMERCIAL

## 2024-02-07 LAB
ALBUMIN SERPL-MCNC: 4.1 G/DL (ref 3.5–5.2)
ALBUMIN/GLOB SERPL: 1.4 G/DL
ALP SERPL-CCNC: 103 U/L (ref 39–117)
ALT SERPL W P-5'-P-CCNC: 17 U/L (ref 1–33)
ANION GAP SERPL CALCULATED.3IONS-SCNC: 9 MMOL/L (ref 5–15)
AST SERPL-CCNC: 20 U/L (ref 1–32)
BASOPHILS # BLD AUTO: 0.05 10*3/MM3 (ref 0–0.2)
BASOPHILS NFR BLD AUTO: 0.6 % (ref 0–1.5)
BILIRUB SERPL-MCNC: <0.2 MG/DL (ref 0–1.2)
BUN SERPL-MCNC: 14 MG/DL (ref 6–20)
BUN/CREAT SERPL: 18.2 (ref 7–25)
CALCIUM SPEC-SCNC: 9.9 MG/DL (ref 8.6–10.5)
CHLORIDE SERPL-SCNC: 106 MMOL/L (ref 98–107)
CO2 SERPL-SCNC: 25 MMOL/L (ref 22–29)
CREAT SERPL-MCNC: 0.77 MG/DL (ref 0.57–1)
DEPRECATED RDW RBC AUTO: 40.9 FL (ref 37–54)
EGFRCR SERPLBLD CKD-EPI 2021: 107.9 ML/MIN/1.73
EOSINOPHIL # BLD AUTO: 0.14 10*3/MM3 (ref 0–0.4)
EOSINOPHIL NFR BLD AUTO: 1.6 % (ref 0.3–6.2)
ERYTHROCYTE [DISTWIDTH] IN BLOOD BY AUTOMATED COUNT: 14.4 % (ref 12.3–15.4)
GLOBULIN UR ELPH-MCNC: 3 GM/DL
GLUCOSE SERPL-MCNC: 180 MG/DL (ref 65–99)
HCT VFR BLD AUTO: 39.1 % (ref 34–46.6)
HGB BLD-MCNC: 12.5 G/DL (ref 12–15.9)
IMM GRANULOCYTES # BLD AUTO: 0.04 10*3/MM3 (ref 0–0.05)
IMM GRANULOCYTES NFR BLD AUTO: 0.5 % (ref 0–0.5)
LYMPHOCYTES # BLD AUTO: 2.75 10*3/MM3 (ref 0.7–3.1)
LYMPHOCYTES NFR BLD AUTO: 31.4 % (ref 19.6–45.3)
MCH RBC QN AUTO: 25.2 PG (ref 26.6–33)
MCHC RBC AUTO-ENTMCNC: 32 G/DL (ref 31.5–35.7)
MCV RBC AUTO: 78.8 FL (ref 79–97)
MONOCYTES # BLD AUTO: 0.4 10*3/MM3 (ref 0.1–0.9)
MONOCYTES NFR BLD AUTO: 4.6 % (ref 5–12)
NEUTROPHILS NFR BLD AUTO: 5.39 10*3/MM3 (ref 1.7–7)
NEUTROPHILS NFR BLD AUTO: 61.3 % (ref 42.7–76)
NRBC BLD AUTO-RTO: 0 /100 WBC (ref 0–0.2)
PLATELET # BLD AUTO: 296 10*3/MM3 (ref 140–450)
PMV BLD AUTO: 11.9 FL (ref 6–12)
POTASSIUM SERPL-SCNC: 3.5 MMOL/L (ref 3.5–5.2)
PROT SERPL-MCNC: 7.1 G/DL (ref 6–8.5)
RBC # BLD AUTO: 4.96 10*6/MM3 (ref 3.77–5.28)
SODIUM SERPL-SCNC: 140 MMOL/L (ref 136–145)
WBC NRBC COR # BLD AUTO: 8.77 10*3/MM3 (ref 3.4–10.8)

## 2024-02-09 NOTE — TELEPHONE ENCOUNTER
I left a message on the patients voicemail to call our office back, office number provided.     RELAY:    Please have her confirm with her plan that wegovy is covered.

## 2024-02-09 NOTE — TELEPHONE ENCOUNTER
HUB PROVIDED THE RELAY MESSAGE FROM THE OFFICE      PATIENT: VOICED UNDERSTANDING AND HAS NO FURTHER QUESTIONS AT THIS TIME    ADDITIONAL INFORMATION:

## 2024-02-11 DIAGNOSIS — R73.03 PREDIABETES: Primary | ICD-10-CM

## 2024-02-18 DIAGNOSIS — R73.03 PRE-DIABETES: ICD-10-CM

## 2024-02-19 RX ORDER — METFORMIN HYDROCHLORIDE 750 MG/1
750 TABLET, EXTENDED RELEASE ORAL
Qty: 90 TABLET | Refills: 1 | Status: SHIPPED | OUTPATIENT
Start: 2024-02-19

## 2024-02-28 ENCOUNTER — OFFICE VISIT (OUTPATIENT)
Dept: INTERNAL MEDICINE | Facility: CLINIC | Age: 29
End: 2024-02-28
Payer: COMMERCIAL

## 2024-02-28 VITALS
RESPIRATION RATE: 20 BRPM | OXYGEN SATURATION: 98 % | DIASTOLIC BLOOD PRESSURE: 80 MMHG | WEIGHT: 210 LBS | BODY MASS INDEX: 37.21 KG/M2 | HEART RATE: 112 BPM | SYSTOLIC BLOOD PRESSURE: 134 MMHG | TEMPERATURE: 97.8 F

## 2024-02-28 DIAGNOSIS — J02.9 SORE THROAT: ICD-10-CM

## 2024-02-28 DIAGNOSIS — B96.89 BACTERIAL SINUSITIS: Primary | ICD-10-CM

## 2024-02-28 DIAGNOSIS — J32.9 BACTERIAL SINUSITIS: Primary | ICD-10-CM

## 2024-02-28 LAB
EXPIRATION DATE: NORMAL
EXPIRATION DATE: NORMAL
FLUAV AG UPPER RESP QL IA.RAPID: NOT DETECTED
FLUBV AG UPPER RESP QL IA.RAPID: NOT DETECTED
INTERNAL CONTROL: NORMAL
INTERNAL CONTROL: NORMAL
Lab: NORMAL
Lab: NORMAL
S PYO AG THROAT QL: NEGATIVE
SARS-COV-2 AG UPPER RESP QL IA.RAPID: NOT DETECTED

## 2024-02-28 PROCEDURE — 87428 SARSCOV & INF VIR A&B AG IA: CPT | Performed by: STUDENT IN AN ORGANIZED HEALTH CARE EDUCATION/TRAINING PROGRAM

## 2024-02-28 PROCEDURE — 87880 STREP A ASSAY W/OPTIC: CPT | Performed by: STUDENT IN AN ORGANIZED HEALTH CARE EDUCATION/TRAINING PROGRAM

## 2024-02-28 PROCEDURE — 99213 OFFICE O/P EST LOW 20 MIN: CPT | Performed by: STUDENT IN AN ORGANIZED HEALTH CARE EDUCATION/TRAINING PROGRAM

## 2024-02-28 RX ORDER — AMOXICILLIN AND CLAVULANATE POTASSIUM 875; 125 MG/1; MG/1
1 TABLET, FILM COATED ORAL 2 TIMES DAILY
Qty: 20 TABLET | Refills: 0 | Status: SHIPPED | OUTPATIENT
Start: 2024-02-28 | End: 2024-03-09

## 2024-02-28 NOTE — PROGRESS NOTES
Follow Up Office Visit      Date: 2024   Patient Name: Elisabeth Martinez  : 1995   MRN: [id number]     Chief Complaint:    Chief Complaint   Patient presents with    Sore Throat    Cough    Nasal Congestion       History of Present Illness: Elisabeth Martinez is a 28 y.o. female who is here today for sinus congestion, cough and sore throat.    HPI    The patient woke up on 2024 with severe throat pain. Last night, she developed a cough, which is worsening. She also has severe green drainage. Her voice was hoarse this morning. She denies any fevers, chills, ear pain, or fullness. She has not been around anyone sick or traveled recently. She has not tried any medications. She has a history of asthma, and uses albuterol as needed. She has seen an allergist in the past. She takes Singulair, Zyrtec, and Flonase daily. She noticed some wheezing this morning. She is not taking any steroids.     Subjective      Review of Systems:   Review of Systems    I have reviewed the patients family history, social history, past medical history, past surgical history and have updated it as appropriate.     Medications:     Current Outpatient Medications:     albuterol sulfate  (90 Base) MCG/ACT inhaler, Inhale 2 puffs Every 4 (Four) Hours As Needed for Wheezing., Disp: 18 g, Rfl: 1    cetirizine (zyrTEC) 10 MG tablet, Take 1 tablet by mouth Daily., Disp: 90 tablet, Rfl: 1    fluticasone (FLONASE) 50 MCG/ACT nasal spray, 2 sprays into the nostril(s) as directed by provider Daily., Disp: 33.3 mL, Rfl: 5    hydrOXYzine (ATARAX) 10 MG tablet, TAKE ONE TABLET BY MOUTH EVERY 8 HOURS AS NEEDED FOR ITCHING, Disp: 90 tablet, Rfl: 0    ketorolac (TORADOL) 10 MG tablet, Take 1 tablet by mouth Every 6 (Six) Hours As Needed for Moderate Pain., Disp: 15 tablet, Rfl: 0    metFORMIN ER (GLUCOPHAGE-XR) 750 MG 24 hr tablet, TAKE 1 TABLET BY MOUTH DAILY WITH BREAKFAST, Disp: 90 tablet, Rfl: 1    montelukast (SINGULAIR) 10  MG tablet, Take 1 tablet by mouth Every Night. NEEDS APPOINTMENT FOR NEXT REFILL, Disp: 90 tablet, Rfl: 1    multivitamin with minerals tablet tablet, Take 1 tablet by mouth Daily., Disp: , Rfl:     ondansetron ODT (ZOFRAN-ODT) 4 MG disintegrating tablet, Place 1 tablet on the tongue Every 8 (Eight) Hours As Needed for Nausea., Disp: 12 tablet, Rfl: 0    oxyCODONE-acetaminophen (PERCOCET) 5-325 MG per tablet, Take 1 tablet by mouth Every 6 (Six) Hours As Needed for Severe Pain., Disp: 12 tablet, Rfl: 0    Semaglutide,0.25 or 0.5MG/DOS, (Ozempic, 0.25 or 0.5 MG/DOSE,) 2 MG/3ML solution pen-injector, Inject 0.25 mg under the skin into the appropriate area as directed 1 (One) Time Per Week for 4 doses., Disp: 2 mL, Rfl: 0    SITagliptin (Januvia) 50 MG tablet, Take 1 tablet by mouth Daily., Disp: 30 tablet, Rfl: 0    tamsulosin (FLOMAX) 0.4 MG capsule 24 hr capsule, Take 1 capsule by mouth Every Night., Disp: 14 capsule, Rfl: 0    vilazodone (VIIBRYD) 40 MG tablet tablet, Take 1 tablet by mouth Daily., Disp: 90 tablet, Rfl: 1    vitamin C (ASCORBIC ACID) 250 MG tablet, Take 1 tablet by mouth Daily., Disp: , Rfl:     Vitamin D, Cholecalciferol, 50 MCG (2000 UT) capsule, Take 2 capsules by mouth., Disp: , Rfl:     rizatriptan (Maxalt) 10 MG tablet, Take 1 tablet by mouth 1 (One) Time As Needed for Migraine for up to 30 days. May repeat in 2 hours if needed, Disp: 10 tablet, Rfl: 3    topiramate (Topamax) 50 MG tablet, Take 1.5 tablets by mouth 2 (Two) Times a Day for 30 days., Disp: 90 tablet, Rfl: 6    Allergies:   No Known Allergies    Objective     Physical Exam: Please see above  Vital Signs:   Vitals:    02/28/24 1256   BP: 134/80   BP Location: Left arm   Patient Position: Sitting   Cuff Size: Adult   Pulse: 112   Resp: 20   Temp: 97.8 °F (36.6 °C)   TempSrc: Temporal   SpO2: 98%   Weight: 95.3 kg (210 lb)   PainSc: 0-No pain     Body mass index is 37.21 kg/m².    Physical Exam  Vitals reviewed.   Constitutional:        General: She is not in acute distress.     Appearance: Normal appearance. She is obese. She is not ill-appearing or toxic-appearing.   HENT:      Head: Normocephalic and atraumatic.      Right Ear: Tympanic membrane and external ear normal.      Left Ear: Tympanic membrane and external ear normal.      Nose: Nose normal. Congestion and rhinorrhea (clear) present.      Comments: R>L sinus tenderness maxilla     Mouth/Throat:      Mouth: Mucous membranes are moist.      Pharynx: Posterior oropharyngeal erythema present. No oropharyngeal exudate.      Comments: Post nasal drip  Eyes:      General: No scleral icterus.     Extraocular Movements: Extraocular movements intact.      Pupils: Pupils are equal, round, and reactive to light.   Cardiovascular:      Rate and Rhythm: Regular rhythm.      Pulses: Normal pulses.      Heart sounds: Normal heart sounds.   Pulmonary:      Effort: Pulmonary effort is normal. No respiratory distress.      Breath sounds: Normal breath sounds. No stridor. No wheezing, rhonchi or rales.   Abdominal:      General: Abdomen is flat. There is no distension.   Musculoskeletal:         General: No swelling or tenderness. Normal range of motion.      Cervical back: Normal range of motion. No rigidity or tenderness.   Lymphadenopathy:      Cervical: Cervical adenopathy (shotty anterior cervical, mobile) present.   Skin:     General: Skin is warm and dry.      Capillary Refill: Capillary refill takes less than 2 seconds.      Findings: No erythema or rash.   Neurological:      General: No focal deficit present.      Mental Status: She is alert and oriented to person, place, and time.   Psychiatric:         Mood and Affect: Mood normal.         Behavior: Behavior normal.         Judgment: Judgment normal.         Procedures    Results:   Labs:   Hemoglobin A1C   Date Value Ref Range Status   06/26/2023 6.1 % Final   01/31/2023 6.20 (H) 4.80 - 5.60 % Final     TSH   Date Value Ref Range Status    02/14/2023 1.840 0.270 - 4.200 uIU/mL Final        Imaging:   No valid procedures specified.     Assessment / Plan      Assessment/Plan:   Problem List Items Addressed This Visit    None  Visit Diagnoses       Bacterial sinusitis    -  Primary    Relevant Medications    amoxicillin-clavulanate (AUGMENTIN) 875-125 MG per tablet    Sore throat        Relevant Orders    POC Rapid Strep A (Completed)    POCT SARS-CoV-2 Antigen RADHA + Flu (Completed)        Patient with negative COVID flu and strep testing in clinic, discussed with patient.  Suspect patient has bacterial sinusitis, will treat empirically with Augmentin twice daily for 10 days.  Counseled on red flag symptoms and indications to return to clinic or seek further medical care.  Counseled on supportive care at home    Follow Up:   Return if symptoms worsen or fail to improve.        Shahbaz Keene MD  [facility] Antonio Gomes    Transcribed from ambient dictation for Shahbaz Keene MD by Antonio Suggs.  02/28/24   14:58 EST    Patient or patient representative verbalized consent to the visit recording.  I have personally performed the services described in this document as transcribed by the above individual, and it is both accurate and complete.

## 2024-02-29 ENCOUNTER — TELEPHONE (OUTPATIENT)
Dept: INTERNAL MEDICINE | Facility: CLINIC | Age: 29
End: 2024-02-29
Payer: COMMERCIAL

## 2024-02-29 DIAGNOSIS — F32.1 MODERATE MAJOR DEPRESSION: ICD-10-CM

## 2024-02-29 DIAGNOSIS — R73.03 PREDIABETES: ICD-10-CM

## 2024-02-29 DIAGNOSIS — R05.1 ACUTE COUGH: Primary | ICD-10-CM

## 2024-02-29 RX ORDER — BENZONATATE 100 MG/1
100 CAPSULE ORAL 3 TIMES DAILY PRN
Qty: 90 CAPSULE | Refills: 0 | Status: SHIPPED | OUTPATIENT
Start: 2024-02-29

## 2024-02-29 NOTE — TELEPHONE ENCOUNTER
Caller: Elisabeth Martinez    Relationship: Self    Best call back number: [phone]     What is the best time to reach you: ANYTIME    Who are you requesting to speak with (clinical staff, provider,  specific staff member): CLINICAL STAFF    What was the call regarding:THE PATIENT WOULD LIKE TO KNOW IF AMOXICLAV WILL HELP WITH COUGHING? IF NOT CAN SOMETHING ADDITIONAL BE PRESCRIBED?

## 2024-02-29 NOTE — TELEPHONE ENCOUNTER
Patient stated that she would like something called in for her cough she was rx'd augmentin yesterday but also needs something called in for her cough please.

## 2024-03-01 ENCOUNTER — TELEPHONE (OUTPATIENT)
Dept: INTERNAL MEDICINE | Facility: CLINIC | Age: 29
End: 2024-03-01
Payer: COMMERCIAL

## 2024-03-01 DIAGNOSIS — B96.89 BACTERIAL SINUSITIS: Primary | ICD-10-CM

## 2024-03-01 DIAGNOSIS — J32.9 BACTERIAL SINUSITIS: Primary | ICD-10-CM

## 2024-03-01 RX ORDER — VILAZODONE HYDROCHLORIDE 40 MG/1
40 TABLET ORAL DAILY
Qty: 90 TABLET | Refills: 1 | Status: SHIPPED | OUTPATIENT
Start: 2024-03-01

## 2024-03-01 RX ORDER — METHYLPREDNISOLONE 4 MG/1
TABLET ORAL
Qty: 21 TABLET | Refills: 0 | Status: SHIPPED | OUTPATIENT
Start: 2024-03-01

## 2024-03-01 RX ORDER — TOPIRAMATE 50 MG/1
75 TABLET, FILM COATED ORAL 2 TIMES DAILY
Qty: 90 TABLET | Refills: 6 | OUTPATIENT
Start: 2024-03-01 | End: 2024-03-31

## 2024-03-01 RX ORDER — TOPIRAMATE 50 MG/1
50 TABLET, FILM COATED ORAL 2 TIMES DAILY
Qty: 60 TABLET | OUTPATIENT
Start: 2024-03-01

## 2024-03-01 NOTE — TELEPHONE ENCOUNTER
PATIENT HAS CALLED AND SATED SHE WAS SEEN BY DR. DRISCOLL ON 02/28/24 AND TREATED FOR A SINUS INFECTION. PATIENT STATES MEDICATION HAS NOT HELPED AND USUALLY IS PRESCRIBED PREDNISONE STEROID ALONG WITH MEDICATION. PATIENT IS REQUESTING IF PRESCRIPTION FOR PREDNISONE CAN BE CALLED INTO [facility] IN [place] ON [facility]. PATIENT IS REQUESTING A CALL BACK EITHER WAY TO LET HER KNOW IF SOMETHING WILL BE CALLED IN.    CALL BACK NUMBER [phone]

## 2024-03-01 NOTE — TELEPHONE ENCOUNTER
Typically I do not recommend steroids for sinus infection, but will provide as she has had this multiple times in the past. Prescribing medrol dosepak.    DR. Keene

## 2024-03-01 NOTE — TELEPHONE ENCOUNTER
Rx Refill Note  Requested Prescriptions     Pending Prescriptions Disp Refills    topiramate (Topamax) 50 MG tablet 90 tablet 6     Sig: Take 1.5 tablets by mouth 2 (Two) Times a Day for 30 days.      Last filled:  Last office visit with prescribing clinician: 11/7/2023      Next office visit with prescribing clinician: 5/7/2024     Suhail Bruce MA  03/01/24, 08:36 EST

## 2024-03-04 DIAGNOSIS — R73.03 PREDIABETES: ICD-10-CM

## 2024-03-04 RX ORDER — RIZATRIPTAN BENZOATE 10 MG/1
TABLET ORAL
Qty: 10 TABLET | Refills: 3 | Status: SHIPPED | OUTPATIENT
Start: 2024-03-04

## 2024-03-04 RX ORDER — SITAGLIPTIN 50 MG/1
50 TABLET, FILM COATED ORAL DAILY
Qty: 30 TABLET | Refills: 0 | OUTPATIENT
Start: 2024-03-04

## 2024-03-04 NOTE — TELEPHONE ENCOUNTER
Rx Refill Note  Requested Prescriptions     Pending Prescriptions Disp Refills    rizatriptan (MAXALT) 10 MG tablet [Pharmacy Med Name: RIZATRIPTAN 10 MG TABLET] 10 tablet 3     Sig: TAKE 1 TABLET BY MOUTH AT ONSET OF HEADACHE; MAY REPEAT 1 TABLET IN 2 HOURS IF NEEDED.      Last filled: 11/7/23 #10 + 3 refills    Last office visit with prescribing clinician: 11/7/2023      Next office visit with prescribing clinician: 5/7/2024     Suhail Bruce MA  03/04/24, 10:21 EST

## 2024-03-13 ENCOUNTER — OFFICE VISIT (OUTPATIENT)
Dept: INTERNAL MEDICINE | Facility: CLINIC | Age: 29
End: 2024-03-13
Payer: COMMERCIAL

## 2024-03-13 VITALS
TEMPERATURE: 97.3 F | HEIGHT: 63 IN | WEIGHT: 209.8 LBS | RESPIRATION RATE: 16 BRPM | HEART RATE: 76 BPM | BODY MASS INDEX: 37.17 KG/M2 | DIASTOLIC BLOOD PRESSURE: 94 MMHG | SYSTOLIC BLOOD PRESSURE: 132 MMHG

## 2024-03-13 DIAGNOSIS — Z00.00 WELLNESS EXAMINATION: Primary | ICD-10-CM

## 2024-03-13 DIAGNOSIS — Z23 ENCOUNTER FOR IMMUNIZATION: ICD-10-CM

## 2024-03-13 DIAGNOSIS — K62.5 BRBPR (BRIGHT RED BLOOD PER RECTUM): ICD-10-CM

## 2024-03-13 DIAGNOSIS — N20.0 KIDNEY STONE: ICD-10-CM

## 2024-03-13 DIAGNOSIS — Z12.11 ENCOUNTER FOR COLORECTAL CANCER SCREENING: ICD-10-CM

## 2024-03-13 DIAGNOSIS — E55.9 VITAMIN D DEFICIENCY: ICD-10-CM

## 2024-03-13 DIAGNOSIS — R73.03 PREDIABETES: ICD-10-CM

## 2024-03-13 DIAGNOSIS — R03.0 ELEVATED BLOOD PRESSURE READING: ICD-10-CM

## 2024-03-13 DIAGNOSIS — R19.7 DIARRHEA, UNSPECIFIED TYPE: ICD-10-CM

## 2024-03-13 DIAGNOSIS — Q62.11 HYDRONEPHROSIS WITH URETEROPELVIC JUNCTION (UPJ) OBSTRUCTION: ICD-10-CM

## 2024-03-13 DIAGNOSIS — N04.9 NEPHROSIS: ICD-10-CM

## 2024-03-13 DIAGNOSIS — Z12.12 ENCOUNTER FOR COLORECTAL CANCER SCREENING: ICD-10-CM

## 2024-03-13 DIAGNOSIS — R91.1 LUNG NODULE: ICD-10-CM

## 2024-03-13 PROBLEM — E11.9 TYPE 2 DIABETES MELLITUS WITHOUT COMPLICATION, WITHOUT LONG-TERM CURRENT USE OF INSULIN: Status: ACTIVE | Noted: 2024-03-13

## 2024-03-13 LAB
EXPIRATION DATE: ABNORMAL
HBA1C MFR BLD: 6.5 % (ref 4.5–5.7)
Lab: ABNORMAL

## 2024-03-13 PROCEDURE — 84443 ASSAY THYROID STIM HORMONE: CPT | Performed by: NURSE PRACTITIONER

## 2024-03-13 PROCEDURE — 82306 VITAMIN D 25 HYDROXY: CPT | Performed by: NURSE PRACTITIONER

## 2024-03-13 RX ORDER — LEVOMEFOLATE/ALGAL OIL 15-90.314
1 CAPSULE ORAL DAILY
Qty: 90 CAPSULE | Refills: 1 | Status: SHIPPED | OUTPATIENT
Start: 2024-03-13

## 2024-03-13 NOTE — LETTER
[facility]  Vaccine Consent Form    Patient Name:  Elisabeth Martinez  Patient :  1995     Vaccine(s) Ordered    Pneumococcal Conjugate Vaccine 20-Valent All        Screening Checklist  The following questions should be completed prior to vaccination. If you answer “yes” to any question, it does not necessarily mean you should not be vaccinated. It just means we may need to clarify or ask more questions. If a question is unclear, please ask your healthcare provider to explain it.    Yes No   Any fever or moderate to severe illness today (mild illness and/or antibiotic treatment are not contraindications)?     Do you have a history of a serious reaction to any previous vaccinations, such as anaphylaxis, encephalopathy within 7 days, Guillain-Loving syndrome within 6 weeks, seizure?     Have you received any live vaccine(s) (e.g MMR, CAMPBELL) or any other vaccines in the last month (to ensure duplicate doses aren't given)?     Do you have an anaphylactic allergy to latex (DTaP, DTaP-IPV, Hep A, Hep B, MenB, RV, Td, Tdap), baker’s yeast (Hep B, HPV), polysorbates (RSV, nirsevimab, PCV 20, Rotavirrus, Tdap, Shingrix), or gelatin (CAMPBELL, MMR)?     Do you have an anaphylactic allergy to neomycin (Rabies, CAMPBELL, MMR, IPV, Hep A), polymyxin B (IPV), or streptomycin (IPV)?      Any cancer, leukemia, AIDS, or other immune system disorder? (CAMPBELL, MMR, RV)     Do you have a parent, brother, or sister with an immune system problem (if immune competence of vaccine recipient clinically verified, can proceed)? (MMR, CAMPBELL)     Any recent steroid treatments for >2 weeks, chemotherapy, or radiation treatment? (CAMPBELL, MMR)     Have you received antibody-containing blood transfusions or IVIG in the past 11 months (recommended interval is dependent on product)? (MMR, CAMPBELL)     Have you taken antiviral drugs (acyclovir, famciclovir, valacyclovir for CAMPBELL) in the last 24 or 48 hours, respectively?      Are you pregnant or planning to become  "pregnant within 1 month? (CAMPBELL, MMR, HPV, IPV, MenB, Abrexvy; For Hep B- refer to Engerix-B; For RSV - Abrysvo is indicated for 32-36 weeks of pregnancy from September to January)     For infants, have you ever been told your child has had intussusception or a medical emergency involving obstruction of the intestine (Rotavirus)? If not for an infant, can skip this question.         *Ordering Physicians/APC should be consulted if \"yes\" is checked by the patient or guardian above.  I have received, read, and understand the Vaccine Information Statement (VIS) for each vaccine ordered.  I have considered my or my child's health status as well as the health status of my close contacts.  I have taken the opportunity to discuss my vaccine questions with my or my child's health care provider.   I have requested that the ordered vaccine(s) be given to me or my child.  I understand the benefits and risks of the vaccines.  I understand that I should remain in the clinic for 15 minutes after receiving the vaccine(s).  _________________________________________________________  Signature of Patient or Parent/Legal Guardian ____________________  Date   "

## 2024-03-13 NOTE — PROGRESS NOTES
Female Physical Note      Patient Name: Elisabeth Martinez  : 1995   MRN: [id number]     Chief Complaint:    Chief Complaint   Patient presents with    Annual Exam       History of Present Illness: Elisabeth Martinez is a 28 y.o. female who is here today for their annual health maintenance and physical.     Are you currently seeing any other doctors or specialists?   Sleep medicine-[facility]    Sleep: not sleeping well    Regular dental visits:  Yes, every 6 months  Regular eye exams:  Yes, yearly, wears glasses   G 0 P0  Birth control no  LMP October     The patient presents for a wellness exam. She states she has been struggling with diarrhea for approximately over the last year. She was recently seen by PATRIC Ramirez and she stopped her metformin and was placed on Januvia. She was having some improvement with the diarrhea; however, she was still having it at least once a day. She did have an episode back in 2024 where she saw blood in the stool for 2 days. She had some intermittent bright red blood when she wiped, and this was the first time she saw it in the stool. She notices  diarrhea sometimes after coffee. She has been having some left upper abdominal pain on her side. She denies there is any blood in her urine. She denies any constipation, or any food triggers. She denies any history of Crohn's disease, inflammatory bowel disease, ulcerative colitis, or colon cancer.      She had an evaluation with sleep medicine. They ordered a sleep study; however, due to insurance issues, she has not been able to get that done. She is snoring, has some daytime drowsiness, not sleeping well, and interrupted sleep.      She states she was found to have a folate deficiency by her psychiatrist when she completed some genetic testing. She was placed on L-methylfolate 15 mg once a day for folate deficiency. She has been on it for some time and her prescription has run out, and she has not been able to  get back to see her psychiatrist.      She has a history of polycystic ovary syndrome. She is following with PATRIC Selby. She has not had a period in several months. She currently denies hematuria.      She is no longer following her endocrinologist. Her dietary changes she has switched to wheat bread, and she has cut out soda. She does not really have a consistent exercise routine.      She has a history of asthma. She denies any current wheezing or nighttime awakenings. She uses albuterol as needed. She has not used it in years. She states her asthma used to be severe as a kid and she used to have to do daily breathing treatments.            Subjective     Review of System: Review of Systems   Gastrointestinal:  Positive for abdominal pain (left abdominal pain), blood in stool and diarrhea.          Past Medical History:   Past Medical History:   Diagnosis Date    Allergic     Anemia     Anxiety     Asthma     Depression     Hypothyroidism     Kidney stone     Migraine     Obesity     Ovarian cyst     PCOS (polycystic ovarian syndrome)     Polycystic ovary syndrome        Past Surgical History: History reviewed. No pertinent surgical history.    Family History:   Family History   Problem Relation Age of Onset    Thyroid disease Mother     Hypertension Mother     Seizures Mother     Arthritis Mother     Other Mother         Multiple Sclerosis    Cancer Father     Heart disease Maternal Grandmother     Stroke Maternal Grandmother     Kidney disease Maternal Grandfather     Cancer Maternal Grandfather     Diabetes Maternal Grandfather     Diabetes Paternal Grandfather     Cancer Paternal Grandfather     Kidney disease Paternal Grandfather        Social History:   Social History     Socioeconomic History    Marital status:    Tobacco Use    Smoking status: Never     Passive exposure: Never    Smokeless tobacco: Never   Vaping Use    Vaping status: Never Used   Substance and Sexual Activity    Alcohol  use: Yes     Comment: RARELY    Drug use: Never    Sexual activity: Yes     Partners: Male     Birth control/protection: Condom     Comment: Stopped taking oral pills due to negative side effects       Medications:     Current Outpatient Medications:     albuterol sulfate  (90 Base) MCG/ACT inhaler, Inhale 2 puffs Every 4 (Four) Hours As Needed for Wheezing., Disp: 18 g, Rfl: 1    cetirizine (zyrTEC) 10 MG tablet, Take 1 tablet by mouth Daily., Disp: 90 tablet, Rfl: 1    fluticasone (FLONASE) 50 MCG/ACT nasal spray, 2 sprays into the nostril(s) as directed by provider Daily., Disp: 33.3 mL, Rfl: 5    hydrOXYzine (ATARAX) 10 MG tablet, TAKE ONE TABLET BY MOUTH EVERY 8 HOURS AS NEEDED FOR ITCHING, Disp: 90 tablet, Rfl: 0    montelukast (SINGULAIR) 10 MG tablet, Take 1 tablet by mouth Every Night. NEEDS APPOINTMENT FOR NEXT REFILL, Disp: 90 tablet, Rfl: 1    multivitamin with minerals tablet tablet, Take 1 tablet by mouth Daily., Disp: , Rfl:     rizatriptan (MAXALT) 10 MG tablet, TAKE 1 TABLET BY MOUTH AT ONSET OF HEADACHE; MAY REPEAT 1 TABLET IN 2 HOURS IF NEEDED., Disp: 10 tablet, Rfl: 3    SITagliptin (Januvia) 50 MG tablet, Take 1 tablet by mouth Daily., Disp: 30 tablet, Rfl: 0    topiramate (Topamax) 50 MG tablet, Take 1.5 tablets by mouth 2 (Two) Times a Day for 30 days., Disp: 90 tablet, Rfl: 6    vilazodone (VIIBRYD) 40 MG tablet tablet, Take 1 tablet by mouth Daily., Disp: 90 tablet, Rfl: 1    vitamin C (ASCORBIC ACID) 250 MG tablet, Take 1 tablet by mouth Daily., Disp: , Rfl:     Vitamin D, Cholecalciferol, 50 MCG (2000 UT) capsule, Take 2 capsules by mouth., Disp: , Rfl:     L-Methylfolate-Algae (L-Methylfolate Forte) 15-90.314 MG capsule capsule, Take 1 capsule by mouth Daily., Disp: 90 capsule, Rfl: 1    Allergies:   No Known Allergies    Immunizations:  “Discussed risks/benefits to vaccination, reviewed components of the vaccine, discussed VIS, discussed informed consent, informed consent  "obtained. Patient/Parent was allowed to accept or refuse vaccine. Questions answered to satisfactory state of patient/Parent. We reviewed typical age appropriate and seasonally appropriate vaccinations. Reviewed immunization history and updated state vaccination form as needed. Patient was counseled on COVID-19  Influenza  Prevnar 20   Hep C: Screen per guidelines     Colorectal Screening:     Last Completed Colonoscopy       This patient has no relevant Health Maintenance data.           Pap:    Last Completed Pap Smear       This patient has no relevant Health Maintenance data.           Mammogram:    Last Completed Mammogram       This patient has no relevant Health Maintenance data.               Depression: PHQ-2/9 Depression Screening  Little interest or pleasure in doing things?     Feeling down, depressed, or hopeless?     PHQ-2 Total Score     PHQ-9 Total Score 0       Objective     Physical Exam:  Vital Signs:   Vitals:    03/13/24 1123   BP: 132/94   BP Location: Right arm   Patient Position: Sitting   Cuff Size: Adult   Pulse: 76   Resp: 16   Temp: 97.3 °F (36.3 °C)   TempSrc: Infrared   Weight: 95.2 kg (209 lb 12.8 oz)   Height: 160 cm (63\")   PainSc: 0-No pain     Body mass index is 37.16 kg/m².        Physical Exam  Vitals and nursing note reviewed.   Constitutional:       General: She is not in acute distress.     Appearance: Normal appearance. She is not ill-appearing.   HENT:      Head: Normocephalic.      Right Ear: Tympanic membrane, ear canal and external ear normal. There is no impacted cerumen.      Left Ear: Tympanic membrane, ear canal and external ear normal. There is no impacted cerumen.      Nose: No nasal tenderness or rhinorrhea.      Mouth/Throat:      Mouth: Mucous membranes are moist.      Pharynx: Oropharynx is clear. No oropharyngeal exudate or posterior oropharyngeal erythema.   Eyes:      General:         Right eye: No discharge.         Left eye: No discharge.      Extraocular " Movements: Extraocular movements intact.      Conjunctiva/sclera: Conjunctivae normal.      Pupils: Pupils are equal, round, and reactive to light.   Neck:      Thyroid: No thyromegaly.      Vascular: No carotid bruit.   Cardiovascular:      Rate and Rhythm: Normal rate and regular rhythm.      Pulses: Normal pulses.      Heart sounds: Normal heart sounds. No murmur heard.     No gallop.   Pulmonary:      Effort: Pulmonary effort is normal.      Breath sounds: Normal breath sounds. No wheezing, rhonchi or rales.   Abdominal:      General: Bowel sounds are normal.      Palpations: Abdomen is soft. There is no mass.      Tenderness: There is no abdominal tenderness. There is no right CVA tenderness or left CVA tenderness.   Genitourinary:     Comments: BREAST EXAM: patient declines to have breast exam    PELVIC EXAM: exam declined by the patient   Musculoskeletal:         General: No swelling or tenderness. Normal range of motion.      Cervical back: Normal range of motion.      Right lower leg: No edema.      Left lower leg: No edema.   Lymphadenopathy:      Cervical: No cervical adenopathy.   Skin:     General: Skin is warm and dry.      Capillary Refill: Capillary refill takes less than 2 seconds.      Findings: No erythema or rash.   Neurological:      General: No focal deficit present.      Mental Status: She is alert and oriented to person, place, and time.      Motor: No weakness.   Psychiatric:         Mood and Affect: Mood normal.         Behavior: Behavior is cooperative.         Cognition and Memory: She does not exhibit impaired recent memory.         Procedures    Assessment / Plan      Assessment/Plan:   Diagnoses and all orders for this visit:    1. Wellness examination (Primary)  -     TSH Rfx On Abnormal To Free T4; Future    2. Body mass index (BMI) of 37.0 to 37.9 in adult    3. Prediabetes    4. 4 mm left lower lobe noncalcified nodule (2020)    5. Kidney stone  -     Ambulatory Referral to  Urology    6. Nephrosis    7. Hydronephrosis with ureteropelvic junction (UPJ) obstruction  -     Ambulatory Referral to Urology    8. BRBPR (bright red blood per rectum)  -     Ambulatory Referral to Gastroenterology  -     Ambulatory Referral For Screening Colonoscopy    9. Encounter for colorectal cancer screening  -     Ambulatory Referral For Screening Colonoscopy    10. Diarrhea, unspecified type  -     Ambulatory Referral to Gastroenterology    11. Vitamin D deficiency  -     Vitamin D 25 hydroxy; Future    12. Encounter for immunization  -     COVID-19 F23 (Pfizer) 12yrs+ (COMIRNATY); Future  -     Pneumococcal Conjugate Vaccine 20-Valent All; Future    13. Elevated blood pressure reading    Other orders  -     L-Methylfolate-Algae (L-Methylfolate Forte) 15-90.314 MG capsule capsule; Take 1 capsule by mouth Daily.  Dispense: 90 capsule; Refill: 1       1. Diarrhea  - A referral for GI  was submitted.    2. Bright red stool  - A referral for colonoscopy was placed today/GI referral.     3. Snoring   - She will follow up with sleep medicine to schedule the ordered studies.     4. Folate deficiency   - Sent a L-Methyl Folate-Algae 15-90.314 MG prescription daily.      5. Polycystic ovary syndrome  -She will follow up with PATRIC Selby. She is due for a Pap smear.     6. Asthma   -She uses albuterol as needed.    7. Kidney Stone  - Submitted a referral for urology for evaluation of the kidney stone.    8. Elevated blood pressure.  Her blood pressure is slightly elevated today. She will monitor it at home and let me know if there is not any improvement and bring readings to her next appointment.    9. Health maintenance.  Discussed COVID-19, influenza, and pneumonia vaccine today. She will get influenza vaccine in 2025. She wears sunscreen, and seatbelts.            The patient will follow up in 2 months.      Healthcare Maintenance:   Counseling provided on     Health Maintenance, Female  Reddy a  healthy lifestyle and getting preventive care can go a long way to promote health and wellness. Talk with your health care provider about what schedule of regular examinations is right for you. This is a good chance for you to check in with your provider about disease prevention and staying healthy.  In between checkups, there are plenty of things you can do on your own. Experts have done a lot of research about which lifestyle changes and preventive measures are most likely to keep you healthy. Ask your health care provider for more information.  Weight and diet  Eat a healthy diet  Be sure to include plenty of vegetables, fruits, low-fat dairy products, and lean protein.  Do not eat a lot of foods high in solid fats, added sugars, or salt.  Get regular exercise. This is one of the most important things you can do for your health.  Most adults should exercise for at least 150 minutes each week. The exercise should increase your heart rate and make you sweat (moderate-intensity exercise).  Most adults should also do strengthening exercises at least twice a week. This is in addition to the moderate-intensity exercise.     Maintain a healthy weight  Body mass index (BMI) is a measurement that can be used to identify possible weight problems. It estimates body fat based on height and weight. Your health care provider can help determine your BMI and help you achieve or maintain a healthy weight.  For females 20 years of age and older:  A BMI below 18.5 is considered underweight.  A BMI of 18.5 to 24.9 is normal.  A BMI of 25 to 29.9 is considered overweight.  A BMI of 30 and above is considered obese.     Watch levels of cholesterol and blood lipids  You should start having your blood tested for lipids and cholesterol at 20 years of age, then have this test every 5 years.  You may need to have your cholesterol levels checked more often if:  Your lipid or cholesterol levels are high.  You are older than 50 years of  age.  You are at high risk for heart disease.     Cancer screening  Lung Cancer  Lung cancer screening is recommended for adults 55-80 years old who are at high risk for lung cancer because of a history of smoking.  A yearly low-dose CT scan of the lungs is recommended for people who:  Currently smoke.  Have quit within the past 15 years.  Have at least a 30-pack-year history of smoking. A pack year is smoking an average of one pack of cigarettes a day for 1 year.  Yearly screening should continue until it has been 15 years since you quit.  Yearly screening should stop if you develop a health problem that would prevent you from having lung cancer treatment.     Breast Cancer  Practice breast self-awareness. This means understanding how your breasts normally appear and feel.  It also means doing regular breast self-exams. Let your health care provider know about any changes, no matter how small.  If you are in your 20s or 30s, you should have a clinical breast exam (CBE) by a health care provider every 1-3 years as part of a regular health exam.  If you are 40 or older, have a CBE every year. Also consider having a breast X-ray (mammogram) every year.  If you have a family history of breast cancer, talk to your health care provider about genetic screening.  If you are at high risk for breast cancer, talk to your health care provider about having an MRI and a mammogram every year.  Breast cancer gene (BRCA) assessment is recommended for women who have family members with BRCA-related cancers. BRCA-related cancers include:  Breast.  Ovarian.  Tubal.  Peritoneal cancers.  Results of the assessment will determine the need for genetic counseling and BRCA1 and BRCA2 testing.     Cervical Cancer  Your health care provider may recommend that you be screened regularly for cancer of the pelvic organs (ovaries, uterus, and vagina). This screening involves a pelvic examination, including checking for microscopic changes to the  surface of your cervix (Pap test). You may be encouraged to have this screening done every 3 years, beginning at age 21.  For women ages 30-65, health care providers may recommend pelvic exams and Pap testing every 3 years, or they may recommend the Pap and pelvic exam, combined with testing for human papilloma virus (HPV), every 5 years. Some types of HPV increase your risk of cervical cancer. Testing for HPV may also be done on women of any age with unclear Pap test results.  Other health care providers may not recommend any screening for nonpregnant women who are considered low risk for pelvic cancer and who do not have symptoms. Ask your health care provider if a screening pelvic exam is right for you.  If you have had past treatment for cervical cancer or a condition that could lead to cancer, you need Pap tests and screening for cancer for at least 20 years after your treatment. If Pap tests have been discontinued, your risk factors (such as having a new sexual partner) need to be reassessed to determine if screening should resume. Some women have medical problems that increase the chance of getting cervical cancer. In these cases, your health care provider may recommend more frequent screening and Pap tests.     Colorectal Cancer  This type of cancer can be detected and often prevented.  Routine colorectal cancer screening usually begins at 50 years of age and continues through 75 years of age.  Your health care provider may recommend screening at an earlier age if you have risk factors for colon cancer.  Your health care provider may also recommend using home test kits to check for hidden blood in the stool.  A small camera at the end of a tube can be used to examine your colon directly (sigmoidoscopy or colonoscopy). This is done to check for the earliest forms of colorectal cancer.  Routine screening usually begins at age 50.  Direct examination of the colon should be repeated every 5-10 years through 75  years of age. However, you may need to be screened more often if early forms of precancerous polyps or small growths are found.     Skin Cancer  Check your skin from head to toe regularly.  Tell your health care provider about any new moles or changes in moles, especially if there is a change in a mole's shape or color.  Also tell your health care provider if you have a mole that is larger than the size of a pencil eraser.  Always use sunscreen. Apply sunscreen liberally and repeatedly throughout the day.  Protect yourself by wearing long sleeves, pants, a wide-brimmed hat, and sunglasses whenever you are outside.     Heart disease, diabetes, and high blood pressure  High blood pressure causes heart disease and increases the risk of stroke. High blood pressure is more likely to develop in:  People who have blood pressure in the high end of the normal range (130-139/85-89 mm Hg).  People who are overweight or obese.  People who are .  If you are 18-39 years of age, have your blood pressure checked every 3-5 years. If you are 40 years of age or older, have your blood pressure checked every year. You should have your blood pressure measured twice--once when you are at a hospital or clinic, and once when you are not at a hospital or clinic. Record the average of the two measurements. To check your blood pressure when you are not at a hospital or clinic, you can use:  An automated blood pressure machine at a pharmacy.  A home blood pressure monitor.  If you are between 55 years and 79 years old, ask your health care provider if you should take aspirin to prevent strokes.  Have regular diabetes screenings. This involves taking a blood sample to check your fasting blood sugar level.  If you are at a normal weight and have a low risk for diabetes, have this test once every three years after 45 years of age.  If you are overweight and have a high risk for diabetes, consider being tested at a younger age or  more often.  Preventing infection  Hepatitis B  If you have a higher risk for hepatitis B, you should be screened for this virus. You are considered at high risk for hepatitis B if:  You were born in a country where hepatitis B is common. Ask your health care provider which countries are considered high risk.  Your parents were born in a high-risk country, and you have not been immunized against hepatitis B (hepatitis B vaccine).  You have HIV or AIDS.  You use needles to inject street drugs.  You live with someone who has hepatitis B.  You have had sex with someone who has hepatitis B.  You get hemodialysis treatment.  You take certain medicines for conditions, including cancer, organ transplantation, and autoimmune conditions.     Hepatitis C  Blood testing is recommended for:  Everyone born from 1945 through 1965.  Anyone with known risk factors for hepatitis C.     Sexually transmitted infections (STIs)  You should be screened for sexually transmitted infections (STIs) including gonorrhea and chlamydia if:  You are sexually active and are younger than 24 years of age.  You are older than 24 years of age and your health care provider tells you that you are at risk for this type of infection.  Your sexual activity has changed since you were last screened and you are at an increased risk for chlamydia or gonorrhea. Ask your health care provider if you are at risk.  If you do not have HIV, but are at risk, it may be recommended that you take a prescription medicine daily to prevent HIV infection. This is called pre-exposure prophylaxis (PrEP). You are considered at risk if:  You are sexually active and do not regularly use condoms or know the HIV status of your partner(s).  You take drugs by injection.  You are sexually active with a partner who has HIV.     Talk with your health care provider about whether you are at high risk of being infected with HIV. If you choose to begin PrEP, you should first be tested for  HIV. You should then be tested every 3 months for as long as you are taking PrEP.  Pregnancy  If you are premenopausal and you may become pregnant, ask your health care provider about preconception counseling.  If you may become pregnant, take 400 to 800 micrograms (mcg) of folic acid every day.  If you want to prevent pregnancy, talk to your health care provider about birth control (contraception).  Osteoporosis and menopause  Osteoporosis is a disease in which the bones lose minerals and strength with aging. This can result in serious bone fractures. Your risk for osteoporosis can be identified using a bone density scan.  If you are 65 years of age or older, or if you are at risk for osteoporosis and fractures, ask your health care provider if you should be screened.  Ask your health care provider whether you should take a calcium or vitamin D supplement to lower your risk for osteoporosis.  Menopause may have certain physical symptoms and risks.  Hormone replacement therapy may reduce some of these symptoms and risks.  Talk to your health care provider about whether hormone replacement therapy is right for you.  Follow these instructions at home:  Schedule regular health, dental, and eye exams.  Stay current with your immunizations.  Do not use any tobacco products including cigarettes, chewing tobacco, or electronic cigarettes.  If you are pregnant, do not drink alcohol.  If you are breastfeeding, limit how much and how often you drink alcohol.  Limit alcohol intake to no more than 1 drink per day for nonpregnant women. One drink equals 12 ounces of beer, 5 ounces of wine, or 1½ ounces of hard liquor.  Do not use street drugs.  Do not share needles.  Ask your health care provider for help if you need support or information about quitting drugs.  Tell your health care provider if you often feel depressed.  Tell your health care provider if you have ever been abused or do not feel safe at home.  This information  is not intended to replace advice given to you by your health care provider. Make sure you discuss any questions you have with your health care provider.  Document Released: 07/02/2012 Document Revised: 05/25/2017 Document Reviewed: 09/20/2016  [employer] Patient Education © 2018 [employer] Elisabeth Martinez voices understanding and acceptance of this advice and will call back with any further questions or concerns. AVS with preventive healthcare tips printed for patient.     PATRIC Su  [facility]         Transcribed from ambient dictation for PATRIC Su by Teresa Ayala.  03/13/24   13:22 EDT    Patient or patient representative verbalized consent to the visit recording.  I have personally performed the services described in this document as transcribed by the above individual, and it is both accurate and complete.

## 2024-03-14 LAB
25(OH)D3 SERPL-MCNC: 51.5 NG/ML (ref 30–100)
TSH SERPL DL<=0.05 MIU/L-ACNC: 1.7 UIU/ML (ref 0.27–4.2)

## 2024-03-22 ENCOUNTER — OFFICE VISIT (OUTPATIENT)
Dept: GASTROENTEROLOGY | Facility: CLINIC | Age: 29
End: 2024-03-22
Payer: COMMERCIAL

## 2024-03-22 ENCOUNTER — LAB (OUTPATIENT)
Dept: LAB | Facility: HOSPITAL | Age: 29
End: 2024-03-22
Payer: COMMERCIAL

## 2024-03-22 VITALS
BODY MASS INDEX: 37.03 KG/M2 | HEART RATE: 94 BPM | HEIGHT: 63 IN | TEMPERATURE: 98 F | DIASTOLIC BLOOD PRESSURE: 88 MMHG | SYSTOLIC BLOOD PRESSURE: 120 MMHG | WEIGHT: 209 LBS | OXYGEN SATURATION: 99 %

## 2024-03-22 DIAGNOSIS — K58.0 IRRITABLE BOWEL SYNDROME WITH DIARRHEA: Primary | ICD-10-CM

## 2024-03-22 DIAGNOSIS — K58.0 IRRITABLE BOWEL SYNDROME WITH DIARRHEA: ICD-10-CM

## 2024-03-22 LAB
25(OH)D3 SERPL-MCNC: 56.6 NG/ML (ref 30–100)
ALBUMIN SERPL-MCNC: 4.1 G/DL (ref 3.5–5.2)
ALBUMIN/GLOB SERPL: 1.2 G/DL
ALP SERPL-CCNC: 108 U/L (ref 39–117)
ALT SERPL W P-5'-P-CCNC: 22 U/L (ref 1–33)
ANION GAP SERPL CALCULATED.3IONS-SCNC: 12.2 MMOL/L (ref 5–15)
AST SERPL-CCNC: 18 U/L (ref 1–32)
BASOPHILS # BLD AUTO: 0.06 10*3/MM3 (ref 0–0.2)
BASOPHILS NFR BLD AUTO: 0.9 % (ref 0–1.5)
BILIRUB SERPL-MCNC: 0.3 MG/DL (ref 0–1.2)
BUN SERPL-MCNC: 11 MG/DL (ref 6–20)
BUN/CREAT SERPL: 13.6 (ref 7–25)
CALCIUM SPEC-SCNC: 9.6 MG/DL (ref 8.6–10.5)
CHLORIDE SERPL-SCNC: 108 MMOL/L (ref 98–107)
CO2 SERPL-SCNC: 21.8 MMOL/L (ref 22–29)
CREAT SERPL-MCNC: 0.81 MG/DL (ref 0.57–1)
CRP SERPL-MCNC: 1.37 MG/DL (ref 0–0.5)
DEPRECATED RDW RBC AUTO: 40.8 FL (ref 37–54)
EGFRCR SERPLBLD CKD-EPI 2021: 101.5 ML/MIN/1.73
EOSINOPHIL # BLD AUTO: 0.1 10*3/MM3 (ref 0–0.4)
EOSINOPHIL NFR BLD AUTO: 1.5 % (ref 0.3–6.2)
ERYTHROCYTE [DISTWIDTH] IN BLOOD BY AUTOMATED COUNT: 14.2 % (ref 12.3–15.4)
GLOBULIN UR ELPH-MCNC: 3.4 GM/DL
GLUCOSE SERPL-MCNC: 168 MG/DL (ref 65–99)
HCT VFR BLD AUTO: 41.2 % (ref 34–46.6)
HGB BLD-MCNC: 13.2 G/DL (ref 12–15.9)
IMM GRANULOCYTES # BLD AUTO: 0.02 10*3/MM3 (ref 0–0.05)
IMM GRANULOCYTES NFR BLD AUTO: 0.3 % (ref 0–0.5)
LYMPHOCYTES # BLD AUTO: 2.37 10*3/MM3 (ref 0.7–3.1)
LYMPHOCYTES NFR BLD AUTO: 34.4 % (ref 19.6–45.3)
MAGNESIUM SERPL-MCNC: 2 MG/DL (ref 1.6–2.6)
MCH RBC QN AUTO: 25.6 PG (ref 26.6–33)
MCHC RBC AUTO-ENTMCNC: 32 G/DL (ref 31.5–35.7)
MCV RBC AUTO: 80 FL (ref 79–97)
MONOCYTES # BLD AUTO: 0.27 10*3/MM3 (ref 0.1–0.9)
MONOCYTES NFR BLD AUTO: 3.9 % (ref 5–12)
NEUTROPHILS NFR BLD AUTO: 4.07 10*3/MM3 (ref 1.7–7)
NEUTROPHILS NFR BLD AUTO: 59 % (ref 42.7–76)
NRBC BLD AUTO-RTO: 0 /100 WBC (ref 0–0.2)
PLATELET # BLD AUTO: 252 10*3/MM3 (ref 140–450)
PMV BLD AUTO: 12.3 FL (ref 6–12)
POTASSIUM SERPL-SCNC: 3.6 MMOL/L (ref 3.5–5.2)
PROT SERPL-MCNC: 7.5 G/DL (ref 6–8.5)
RBC # BLD AUTO: 5.15 10*6/MM3 (ref 3.77–5.28)
SODIUM SERPL-SCNC: 142 MMOL/L (ref 136–145)
TSH SERPL DL<=0.05 MIU/L-ACNC: 1.48 UIU/ML (ref 0.27–4.2)
WBC NRBC COR # BLD AUTO: 6.89 10*3/MM3 (ref 3.4–10.8)

## 2024-03-22 PROCEDURE — 86140 C-REACTIVE PROTEIN: CPT

## 2024-03-22 PROCEDURE — 86364 TISS TRNSGLTMNASE EA IG CLAS: CPT

## 2024-03-22 PROCEDURE — 83735 ASSAY OF MAGNESIUM: CPT | Performed by: PHYSICIAN ASSISTANT

## 2024-03-22 PROCEDURE — 86231 EMA EACH IG CLASS: CPT

## 2024-03-22 PROCEDURE — 36415 COLL VENOUS BLD VENIPUNCTURE: CPT | Performed by: PHYSICIAN ASSISTANT

## 2024-03-22 PROCEDURE — 86258 DGP ANTIBODY EACH IG CLASS: CPT

## 2024-03-22 PROCEDURE — 82306 VITAMIN D 25 HYDROXY: CPT | Performed by: PHYSICIAN ASSISTANT

## 2024-03-22 PROCEDURE — 80050 GENERAL HEALTH PANEL: CPT

## 2024-03-22 PROCEDURE — 82784 ASSAY IGA/IGD/IGG/IGM EACH: CPT

## 2024-03-22 NOTE — PROGRESS NOTES
New Patient Consultation     Patient Name: Elisabeth Martinez  : 1995   MRN: [id number]     Chief Complaint:    Chief Complaint   Patient presents with    Diarrhea     Going 3-4 times a day.        History of Present Illness: Elisabeth Martinez is a 28 y.o. female, PMH includes T2DM, anxiety, depression, asthma, PCOS, migraines, thyroid disease, who is here today for a Gastroenterology Consultation for diarrhea.    Pt notes one year h/o diarrhea, passing 3-4 loose, urgent stools per day. She states that stool frequency has improved to 1-2 per day after discontinuing metformin 2 months ago. She notes mild LLQ pain that improves with evacuation. She uses imodium PRN with some relief. She denies specific aggravating foods, factors or timing. Pt notes blood in stool occurring for a few days x 1 and resolving spontaneously. She denies nocturnal waking or mucous in stool. She denies previous stool studies, abx use, recent travel or new pets in the home. Pt denies fiber supplement or probiotic use.     Patient denies associated fever, chills, indigestion, nausea, vomiting, constipation, hematemesis, dysphagia, melena, bloating, weight loss or gain, dysuria, jaundice or bruising.    Patient denies personal or FHx of PUD, H Pylori, gastritis, pancreatitis, colitis, Celiac disease, UC, Crohn's disease. Paternal grandfather with stomach / liver cancer. Maternal grandmother with colon polyps. Mother with IBS.  Pt denies EtOH, tobacco, illicit substance or NSAID use. No previous abdominal surgeries.     No previous EGD / CSY.     Subjective      Review of Systems:   Review of Systems   Constitutional:  Negative for appetite change, chills, diaphoresis, fatigue, fever, unexpected weight gain and unexpected weight loss.   HENT:  Negative for drooling, facial swelling, mouth sores, rhinorrhea, sore throat, tinnitus, trouble swallowing and voice change.    Eyes: Negative.    Respiratory:  Negative for cough, chest  tightness and shortness of breath.    Cardiovascular:  Negative for chest pain.   Gastrointestinal:  Positive for abdominal pain (LLQ), anal bleeding (resolved) and diarrhea. Negative for constipation, nausea, vomiting, GERD and indigestion.   Genitourinary:  Negative for dysuria, flank pain, hematuria and pelvic pain.   Musculoskeletal:  Negative for arthralgias and myalgias.   Skin:  Negative for color change, pallor and rash.   Neurological:  Negative for dizziness, tremors, syncope, weakness and numbness.   Psychiatric/Behavioral:  Negative for hallucinations and sleep disturbance. The patient is not nervous/anxious.    All other systems reviewed and are negative.      Past Medical History:   Past Medical History:   Diagnosis Date    Allergic     Anemia     Anxiety     Asthma     Depression     Hypothyroidism     Kidney stone     Migraine     Obesity     Ovarian cyst     PCOS (polycystic ovarian syndrome)     Polycystic ovary syndrome        Past Surgical History: No past surgical history on file.    Family History:   Family History   Problem Relation Age of Onset    Thyroid disease Mother     Hypertension Mother     Seizures Mother     Arthritis Mother     Other Mother         Multiple Sclerosis    Cancer Father     Heart disease Maternal Grandmother     Stroke Maternal Grandmother     Kidney disease Maternal Grandfather     Cancer Maternal Grandfather     Diabetes Maternal Grandfather     Diabetes Paternal Grandfather     Cancer Paternal Grandfather     Kidney disease Paternal Grandfather        Social History:   Social History     Socioeconomic History    Marital status:    Tobacco Use    Smoking status: Never     Passive exposure: Never    Smokeless tobacco: Never   Vaping Use    Vaping status: Never Used   Substance and Sexual Activity    Alcohol use: Yes     Comment: RARELY    Drug use: Never    Sexual activity: Yes     Partners: Male     Birth control/protection: Condom     Comment: Stopped taking  oral pills due to negative side effects       Alcohol/Tobacco History:   Social History     Substance and Sexual Activity   Alcohol Use Yes    Comment: RARELY     Social History     Tobacco Use   Smoking Status Never    Passive exposure: Never   Smokeless Tobacco Never       Medications:     Current Outpatient Medications:     albuterol sulfate  (90 Base) MCG/ACT inhaler, Inhale 2 puffs Every 4 (Four) Hours As Needed for Wheezing., Disp: 18 g, Rfl: 1    cetirizine (zyrTEC) 10 MG tablet, Take 1 tablet by mouth Daily., Disp: 90 tablet, Rfl: 1    fluticasone (FLONASE) 50 MCG/ACT nasal spray, 2 sprays into the nostril(s) as directed by provider Daily., Disp: 33.3 mL, Rfl: 5    hydrOXYzine (ATARAX) 10 MG tablet, TAKE ONE TABLET BY MOUTH EVERY 8 HOURS AS NEEDED FOR ITCHING, Disp: 90 tablet, Rfl: 0    L-Methylfolate-Algae (L-Methylfolate Forte) 15-90.314 MG capsule capsule, Take 1 capsule by mouth Daily., Disp: 90 capsule, Rfl: 1    montelukast (SINGULAIR) 10 MG tablet, Take 1 tablet by mouth Every Night. NEEDS APPOINTMENT FOR NEXT REFILL, Disp: 90 tablet, Rfl: 1    multivitamin with minerals tablet tablet, Take 1 tablet by mouth Daily., Disp: , Rfl:     rizatriptan (MAXALT) 10 MG tablet, TAKE 1 TABLET BY MOUTH AT ONSET OF HEADACHE; MAY REPEAT 1 TABLET IN 2 HOURS IF NEEDED., Disp: 10 tablet, Rfl: 3    SITagliptin (Januvia) 50 MG tablet, Take 1 tablet by mouth Daily., Disp: 30 tablet, Rfl: 0    topiramate (Topamax) 50 MG tablet, Take 1.5 tablets by mouth 2 (Two) Times a Day for 30 days., Disp: 90 tablet, Rfl: 6    vilazodone (VIIBRYD) 40 MG tablet tablet, Take 1 tablet by mouth Daily., Disp: 90 tablet, Rfl: 1    vitamin C (ASCORBIC ACID) 250 MG tablet, Take 1 tablet by mouth Daily., Disp: , Rfl:     Vitamin D, Cholecalciferol, 50 MCG (2000 UT) capsule, Take 2 capsules by mouth., Disp: , Rfl:     Allergies:   No Known Allergies    Objective     Physical Exam:  Vital Signs: There were no vitals filed for this  visit.  There is no height or weight on file to calculate BMI.     Physical Exam  Vitals and nursing note reviewed.   Constitutional:       Appearance: Normal appearance. She is normal weight. She is not ill-appearing or diaphoretic.   HENT:      Head: Normocephalic and atraumatic.      Right Ear: External ear normal.      Left Ear: External ear normal.      Nose: Nose normal.      Mouth/Throat:      Mouth: Mucous membranes are moist.      Pharynx: Oropharynx is clear.   Eyes:      Conjunctiva/sclera: Conjunctivae normal.      Pupils: Pupils are equal, round, and reactive to light.   Neck:      Thyroid: No thyromegaly.   Cardiovascular:      Rate and Rhythm: Normal rate and regular rhythm.      Pulses: Normal pulses.      Heart sounds: Normal heart sounds.   Pulmonary:      Effort: Pulmonary effort is normal.      Breath sounds: Normal breath sounds.   Abdominal:      General: Abdomen is flat. Bowel sounds are normal. There is no distension.      Tenderness: There is abdominal tenderness (mild, LLQ).   Musculoskeletal:         General: Normal range of motion.      Cervical back: Normal range of motion and neck supple.   Skin:     General: Skin is warm and dry.   Neurological:      General: No focal deficit present.      Mental Status: She is oriented to person, place, and time.   Psychiatric:         Mood and Affect: Mood normal.         Assessment / Plan      Assessment/Plan:   There are no diagnoses linked to this encounter.     IBS-D   - rx for Xifaxan 550mg TID x 14 days sent to pharmacy    - FLOR IV criteria consistent with IBS, no red flag sx to warrant urgent endoscopy at this time   - obtain CBC, CMP, CRP, Mg, TSH, vit D, celiac panel    - schedule for CSY if sx worsen or persist   - follow up in clinic in 1mo, or after completion of above studies   - call clinic at any time for questions or new / worsened sx    Follow Up:   Return in about 4 weeks (around 4/19/2024).    Plan of care reviewed with the  patient at the conclusion of today's visit.  Education was provided regarding diagnosis, management, and any prescribed or recommended OTC medications.  Patient verbalized understanding of and agreement with management plan.     NOTE TO PATIENT: The 21st Century Cures Act makes medical notes like these available to patients in the interest of transparency. However, be advised this is a medical document. It is intended as peer to peer communication. It is written in medical language and may contain abbreviations or verbiage that are unfamiliar. It may appear blunt or direct. Medical documents are intended to carry relevant information, facts as evident, and the clinical opinion of the practitioner.     Time Statement:   Discussed plan of care in detail with patient today. Patient verbally understands and agrees. I have spent 45 minutes reviewing available diagnostics, obtaining history, examining the patient, developing a treatment plan, and educating the patient on disease process and plan of care.    Jill Dias PA-C   [facility]

## 2024-03-25 LAB
ENDOMYSIUM IGA SER QL: NEGATIVE
GLIADIN PEPTIDE IGA SER-ACNC: 6 UNITS (ref 0–19)
GLIADIN PEPTIDE IGG SER-ACNC: 3 UNITS (ref 0–19)
IGA SERPL-MCNC: 163 MG/DL (ref 87–352)
TTG IGA SER-ACNC: <2 U/ML (ref 0–3)
TTG IGG SER-ACNC: <2 U/ML (ref 0–5)

## 2024-03-26 ENCOUNTER — TELEPHONE (OUTPATIENT)
Dept: GASTROENTEROLOGY | Facility: CLINIC | Age: 29
End: 2024-03-26
Payer: COMMERCIAL

## 2024-03-26 DIAGNOSIS — R79.82 CRP ELEVATED: ICD-10-CM

## 2024-03-26 DIAGNOSIS — K58.0 IRRITABLE BOWEL SYNDROME WITH DIARRHEA: Primary | ICD-10-CM

## 2024-03-26 NOTE — PROGRESS NOTES
Called patient to discuss results. No answer, left VM.  Patient moved to room 7  Patient wrapped her arms around side rail of stretcher and refused to move when brought to room 7  Patient asked several times to move on her own and refused, continued to hold on to side rail  Security to bedside to assist patient to move to new stretcher  Patient attempted to swing at security  Dr Bhavik Gomez notified and verbal order received to put patient in 4 point locked restraints  Dr Mirta Loyd to bedside, medication ordered        Ileana Dennis RN  09/11/18 [id number]

## 2024-03-30 DIAGNOSIS — F41.9 ANXIETY: ICD-10-CM

## 2024-03-30 DIAGNOSIS — R73.03 PREDIABETES: ICD-10-CM

## 2024-04-01 RX ORDER — HYDROXYZINE HYDROCHLORIDE 10 MG/1
TABLET, FILM COATED ORAL
Qty: 90 TABLET | Refills: 0 | Status: SHIPPED | OUTPATIENT
Start: 2024-04-01

## 2024-04-01 RX ORDER — SITAGLIPTIN 50 MG/1
50 TABLET, FILM COATED ORAL DAILY
Qty: 30 TABLET | Refills: 0 | Status: SHIPPED | OUTPATIENT
Start: 2024-04-01

## 2024-04-02 ENCOUNTER — PATIENT MESSAGE (OUTPATIENT)
Dept: INTERNAL MEDICINE | Facility: CLINIC | Age: 29
End: 2024-04-02
Payer: COMMERCIAL

## 2024-04-02 NOTE — TELEPHONE ENCOUNTER
From: Elisabeth Martinez  To: Zully Kirkland  Sent: 4/2/2024 10:30 AM EDT  Subject: Diabetes + Urology Referral    Johnson Andrea,    I saw after my last appointment when bloodwork was drawn, I was diagnosed with diabetes. Merline left a message in my chart but no one ever called me to tell me. Do I need to be taking a new medication for this in addition to what I am on?     Also, someone named Yadira HAMILTON messaged me on 3/14 about my record from [facility]. She said those were needed before I can be referred to a urologist here at [facility]. Do you know if you all have received those records? I am having some pain in my abdomen and would like to be seen sooner rather than later.     Thank you for your help!

## 2024-04-03 ENCOUNTER — OFFICE VISIT (OUTPATIENT)
Dept: UROLOGY | Facility: CLINIC | Age: 29
End: 2024-04-03
Payer: COMMERCIAL

## 2024-04-03 VITALS
SYSTOLIC BLOOD PRESSURE: 114 MMHG | HEIGHT: 63 IN | HEART RATE: 84 BPM | OXYGEN SATURATION: 98 % | BODY MASS INDEX: 37.03 KG/M2 | WEIGHT: 209 LBS | DIASTOLIC BLOOD PRESSURE: 84 MMHG

## 2024-04-03 DIAGNOSIS — N20.0 KIDNEY STONES: Primary | ICD-10-CM

## 2024-04-03 LAB
BILIRUB BLD-MCNC: NEGATIVE MG/DL
BILIRUB BLD-MCNC: NEGATIVE MG/DL
CLARITY, POC: CLEAR
CLARITY, POC: CLEAR
COLOR UR: YELLOW
COLOR UR: YELLOW
EXPIRATION DATE: NORMAL
GLUCOSE UR STRIP-MCNC: NEGATIVE MG/DL
GLUCOSE UR STRIP-MCNC: NEGATIVE MG/DL
KETONES UR QL: NEGATIVE
KETONES UR QL: NEGATIVE
LEUKOCYTE EST, POC: NEGATIVE
LEUKOCYTE EST, POC: NEGATIVE
Lab: NORMAL
NITRITE UR-MCNC: NEGATIVE MG/ML
NITRITE UR-MCNC: NEGATIVE MG/ML
PH UR: 6.5 [PH] (ref 5–8)
PH UR: 6.5 [PH] (ref 5–8)
PROT UR STRIP-MCNC: NEGATIVE MG/DL
PROT UR STRIP-MCNC: NEGATIVE MG/DL
RBC # UR STRIP: NEGATIVE /UL
RBC # UR STRIP: NEGATIVE /UL
SP GR UR: 1.02 (ref 1–1.03)
SP GR UR: 1.02 (ref 1–1.03)
UROBILINOGEN UR QL: NORMAL
UROBILINOGEN UR QL: NORMAL

## 2024-04-03 NOTE — PROGRESS NOTES
Chief Complaint  Kidney Stone    Breeding, PATRIC Andrea  Ms. Martinez is a 28 y.o. female with hx of migraines on topamax who presents for further management of recurrent nephrolithiasis.    She presented to [facility] on 11/01 and was diagnosed with a 4mm left ureteral stone. Pain resolved and stone passed on 11/05. She presents today for follow up.  She is hoping to determine how to prevent future stones.  No fever, chills, nausea, vomiting.  No dysuria.    First began having kidney stones in 2020. Saw Dr. Manning, but was able to pass the stone on her own. Has always passed the stones on her own, has had at least 8 stones.     Has never needed surgery for stones.     Stone related history:  Family history of kidney stones  no  Renal disease or anatomic abnormality: no  Malabsorptive disease or gastric bypass: no  Frequent UTI's    no  Parathyroid disease    no    Dietary Considerations  Soda - 1 per day  Fast food - 5 per week  Water - 50-60 ounces per day  Yes; Consumes lots of salted foods (frozen, prepackaged, canned, salted snacks)?    Past Medical History  Past Medical History:   Diagnosis Date    Allergic     Anemia     Anxiety     Asthma     Depression     Hypothyroidism     Kidney stone     Migraine     Obesity     Ovarian cyst     PCOS (polycystic ovarian syndrome)     Polycystic ovary syndrome        Past Surgical History  History reviewed. No pertinent surgical history.    Medications    Current Outpatient Medications:     albuterol sulfate  (90 Base) MCG/ACT inhaler, Inhale 2 puffs Every 4 (Four) Hours As Needed for Wheezing., Disp: 18 g, Rfl: 1    cetirizine (zyrTEC) 10 MG tablet, Take 1 tablet by mouth Daily., Disp: 90 tablet, Rfl: 1    fluticasone (FLONASE) 50 MCG/ACT nasal spray, 2 sprays into the nostril(s) as directed by provider Daily., Disp: 33.3 mL, Rfl: 5    hydrOXYzine (ATARAX) 10 MG tablet, TAKE ONE TABLET BY MOUTH EVERY 8 HOURS AS NEEDED FOR ITCHING, Disp: 90 tablet, Rfl: 0    Januvia  50 MG tablet, TAKE 1 TABLET BY MOUTH DAILY, Disp: 30 tablet, Rfl: 0    L-Methylfolate-Algae (L-Methylfolate Forte) 15-90.314 MG capsule capsule, Take 1 capsule by mouth Daily., Disp: 90 capsule, Rfl: 1    montelukast (SINGULAIR) 10 MG tablet, Take 1 tablet by mouth Every Night. NEEDS APPOINTMENT FOR NEXT REFILL, Disp: 90 tablet, Rfl: 1    multivitamin with minerals tablet tablet, Take 1 tablet by mouth Daily., Disp: , Rfl:     riFAXIMin (XIFAXAN) 550 MG tablet, Take 1 tablet by mouth 3 (Three) Times a Day for 14 days., Disp: 42 tablet, Rfl: 1    rizatriptan (MAXALT) 10 MG tablet, TAKE 1 TABLET BY MOUTH AT ONSET OF HEADACHE; MAY REPEAT 1 TABLET IN 2 HOURS IF NEEDED., Disp: 10 tablet, Rfl: 3    vilazodone (VIIBRYD) 40 MG tablet tablet, Take 1 tablet by mouth Daily., Disp: 90 tablet, Rfl: 1    vitamin C (ASCORBIC ACID) 250 MG tablet, Take 1 tablet by mouth Daily., Disp: , Rfl:     Vitamin D, Cholecalciferol, 50 MCG (2000 UT) capsule, Take 2 capsules by mouth., Disp: , Rfl:     topiramate (Topamax) 50 MG tablet, Take 1.5 tablets by mouth 2 (Two) Times a Day for 30 days., Disp: 90 tablet, Rfl: 6    Allergies  No Known Allergies    Social History  Social History     Socioeconomic History    Marital status:    Tobacco Use    Smoking status: Never     Passive exposure: Never    Smokeless tobacco: Never   Vaping Use    Vaping status: Never Used   Substance and Sexual Activity    Alcohol use: Yes     Comment: RARELY    Drug use: Never    Sexual activity: Yes     Partners: Male     Birth control/protection: Condom     Comment: Stopped taking oral pills due to negative side effects       Family History  Family History   Problem Relation Age of Onset    Thyroid disease Mother     Hypertension Mother     Seizures Mother     Arthritis Mother     Other Mother         Multiple Sclerosis    Cancer Father     Heart disease Maternal Grandmother     Stroke Maternal Grandmother     Kidney disease Maternal Grandfather     Cancer  "Maternal Grandfather     Diabetes Maternal Grandfather     Diabetes Paternal Grandfather     Cancer Paternal Grandfather     Kidney disease Paternal Grandfather        Physical Exam  Visit Vitals  /84   Pulse 84   Ht 160 cm (62.99\")   Wt 94.8 kg (209 lb)   LMP 10/11/2023 (Approximate)   SpO2 98%   BMI 37.03 kg/m²         Labs  Lab Results   Component Value Date    GLUCOSE 168 (H) 03/22/2024    BUN 11 03/22/2024    CREATININE 0.81 03/22/2024    EGFRIFNONA 92 10/30/2021    BCR 13.6 03/22/2024    K 3.6 03/22/2024    CO2 21.8 (L) 03/22/2024    CALCIUM 9.6 03/22/2024    ALBUMIN 4.1 03/22/2024    AST 18 03/22/2024    ALT 22 03/22/2024       Lab Results   Component Value Date    WBC 6.89 03/22/2024    HGB 13.2 03/22/2024    HCT 41.2 03/22/2024    MCV 80.0 03/22/2024     03/22/2024       Lab Results   Component Value Date    CALCIUM 9.6 03/22/2024       Brief Urine Lab Results  (Last result in the past 365 days)        Color   Clarity   Blood   Leuk Est   Nitrite   Protein   CREAT   Urine HCG        04/03/24 1134 Yellow   Clear   Negative   Negative   Negative   Negative                     Radiologic Studies  XR Abdomen KUB    Result Date: 2/6/2024  Impression: No acute abdominal abnormality. Electronically Signed: Felipe Machado MD  2/6/2024 7:50 PM EST  Workstation ID: [id number]    I have personally reviewed these labs and images.      Assessment  Ms. Martinez is a 28 y.o. female with hx of migraines on topamax with hx of recurrent nephrolithiasis.    Has never undergone metabolic evaluation of kidney stones or had stone testing. Very interested in stone prevention. Encouraged ongoing excellent fluid intake with goal of greater than 64oz daily (2-2.5L water). Discussed AUA guidelines for limiting salt intake less than 2.5g per day.    Plan  1. Follow up after [facility]            "

## 2024-04-05 ENCOUNTER — PATIENT ROUNDING (BHMG ONLY) (OUTPATIENT)
Dept: UROLOGY | Facility: CLINIC | Age: 29
End: 2024-04-05
Payer: COMMERCIAL

## 2024-04-05 RX ORDER — SODIUM PICOSULFATE, MAGNESIUM OXIDE, AND ANHYDROUS CITRIC ACID 10; 3.5; 12 MG/160ML; G/160ML; G/160ML
350 LIQUID ORAL TAKE AS DIRECTED
Qty: 350 ML | Refills: 0 | Status: SHIPPED | OUTPATIENT
Start: 2024-04-05

## 2024-04-05 NOTE — PROGRESS NOTES
A Masabi message has been sent to the patient for PATIENT ROUNDING with [facility].

## 2024-04-11 ENCOUNTER — TELEPHONE (OUTPATIENT)
Dept: GASTROENTEROLOGY | Facility: CLINIC | Age: 29
End: 2024-04-11
Payer: COMMERCIAL

## 2024-04-12 ENCOUNTER — OUTSIDE FACILITY SERVICE (OUTPATIENT)
Dept: GASTROENTEROLOGY | Facility: CLINIC | Age: 29
End: 2024-04-12
Payer: COMMERCIAL

## 2024-04-12 PROCEDURE — 45380 COLONOSCOPY AND BIOPSY: CPT | Performed by: INTERNAL MEDICINE

## 2024-04-12 PROCEDURE — 88305 TISSUE EXAM BY PATHOLOGIST: CPT | Performed by: INTERNAL MEDICINE

## 2024-04-15 ENCOUNTER — LAB REQUISITION (OUTPATIENT)
Dept: LAB | Facility: HOSPITAL | Age: 29
End: 2024-04-15
Payer: COMMERCIAL

## 2024-04-15 DIAGNOSIS — K64.8 OTHER HEMORRHOIDS: ICD-10-CM

## 2024-04-15 DIAGNOSIS — K92.1 MELENA: ICD-10-CM

## 2024-04-15 DIAGNOSIS — K58.0 IRRITABLE BOWEL SYNDROME WITH DIARRHEA: ICD-10-CM

## 2024-04-15 DIAGNOSIS — R19.7 DIARRHEA, UNSPECIFIED: ICD-10-CM

## 2024-04-16 LAB
CYTO UR: NORMAL
LAB AP CASE REPORT: NORMAL
LAB AP CLINICAL INFORMATION: NORMAL
PATH REPORT.FINAL DX SPEC: NORMAL
PATH REPORT.GROSS SPEC: NORMAL

## 2024-04-22 DIAGNOSIS — E11.9 TYPE 2 DIABETES MELLITUS WITHOUT COMPLICATION, WITHOUT LONG-TERM CURRENT USE OF INSULIN: Primary | ICD-10-CM

## 2024-04-22 RX ORDER — BLOOD-GLUCOSE METER
1 KIT MISCELLANEOUS AS NEEDED
Qty: 1 EACH | Refills: 0 | Status: SHIPPED | OUTPATIENT
Start: 2024-04-22 | End: 2024-04-22

## 2024-04-22 RX ORDER — BLOOD-GLUCOSE METER
KIT MISCELLANEOUS
Qty: 1 EACH | Refills: 0 | Status: SHIPPED | OUTPATIENT
Start: 2024-04-22

## 2024-04-22 RX ORDER — LANCETS 30 GAUGE
EACH MISCELLANEOUS
Qty: 200 EACH | Refills: 5 | Status: SHIPPED | OUTPATIENT
Start: 2024-04-22

## 2024-04-22 RX ORDER — LANCETS 30 GAUGE
1 EACH MISCELLANEOUS 4 TIMES DAILY PRN
Qty: 200 EACH | Refills: 5 | Status: SHIPPED | OUTPATIENT
Start: 2024-04-22 | End: 2024-04-22

## 2024-04-23 DIAGNOSIS — J45.20 MILD INTERMITTENT ASTHMA WITHOUT COMPLICATION: ICD-10-CM

## 2024-04-23 DIAGNOSIS — J30.2 SEASONAL ALLERGIES: ICD-10-CM

## 2024-04-23 RX ORDER — MONTELUKAST SODIUM 10 MG/1
TABLET ORAL
Qty: 90 TABLET | Refills: 1 | Status: SHIPPED | OUTPATIENT
Start: 2024-04-23

## 2024-04-24 ENCOUNTER — OFFICE VISIT (OUTPATIENT)
Dept: GASTROENTEROLOGY | Facility: CLINIC | Age: 29
End: 2024-04-24
Payer: COMMERCIAL

## 2024-04-24 VITALS
OXYGEN SATURATION: 98 % | BODY MASS INDEX: 37.03 KG/M2 | WEIGHT: 209 LBS | HEART RATE: 103 BPM | SYSTOLIC BLOOD PRESSURE: 126 MMHG | DIASTOLIC BLOOD PRESSURE: 82 MMHG | HEIGHT: 63 IN | TEMPERATURE: 97.3 F

## 2024-04-24 DIAGNOSIS — K58.0 IRRITABLE BOWEL SYNDROME WITH DIARRHEA: Primary | ICD-10-CM

## 2024-04-24 NOTE — PROGRESS NOTES
Follow Up      Patient Name: Elisabeth Martinez  : 1995   MRN: [id number]     Chief Complaint:  No chief complaint on file.      History of Present Illness: Elisabeth Martinez is a 28 y.o. female who is here today for post procedure follow up.     Pt reports overall improvement of abdominal cramping and stool frequency with course of Xifaxan. She has identified stress / anxiety to be a big factor in her intermittent sx. She generally has one large, loose BM per day. Patient denies associated fever, chills, indigestion, nausea, vomiting, constipation, hematemesis, dysphagia, hematochezia, melena, bloating, weight loss or gain, dysuria, jaundice or bruising.    Labs since last visit reveal CRP 1.37, otherwise normal CBC, CMP, vit D, TSH, Mg. Celiac panel negative.     CSY 2024 with Dr. Valentine. Adequate bowel prep conditions. Internal hemorrhoids. Normal terminal ileum. Normal appearing colon otherwise. Bx negative for microscopic colitis. Recommend repeat CSY at age 45.     Subjective      Review of Systems:   Review of Systems   Constitutional:  Negative for appetite change, chills, diaphoresis, fatigue, fever, unexpected weight gain and unexpected weight loss.   HENT:  Negative for drooling, facial swelling, mouth sores, rhinorrhea, sore throat, tinnitus, trouble swallowing and voice change.    Eyes: Negative.    Respiratory:  Negative for cough, chest tightness and shortness of breath.    Cardiovascular:  Negative for chest pain.   Gastrointestinal:  Positive for abdominal pain (generalized cramping, intermittent) and diarrhea (x 1 per day). Negative for blood in stool, constipation, nausea, vomiting, GERD and indigestion.   Genitourinary:  Negative for dysuria, flank pain, hematuria and pelvic pain.   Musculoskeletal:  Negative for arthralgias and myalgias.   Skin:  Negative for color change, pallor and rash.   Neurological:  Negative for dizziness, tremors, syncope, weakness and numbness.    Psychiatric/Behavioral:  Negative for hallucinations and sleep disturbance. The patient is not nervous/anxious.    All other systems reviewed and are negative.      Medications:       Allergies:   No Known Allergies    Social History:   Social History     Socioeconomic History    Marital status:    Tobacco Use    Smoking status: Never     Passive exposure: Never    Smokeless tobacco: Never   Vaping Use    Vaping status: Never Used   Substance and Sexual Activity    Alcohol use: Yes     Comment: RARELY    Drug use: Never    Sexual activity: Yes     Partners: Male     Birth control/protection: Condom     Comment: Stopped taking oral pills due to negative side effects        Surgical History:   No past surgical history on file.     Medical History:   Past Medical History:   Diagnosis Date    Allergic     Anemia     Anxiety     Asthma     Depression     Hypothyroidism     Kidney stone     Migraine     Obesity     Ovarian cyst     PCOS (polycystic ovarian syndrome)     Polycystic ovary syndrome         Objective     Physical Exam:  Vital Signs: There were no vitals filed for this visit.  There is no height or weight on file to calculate BMI.     Physical Exam  Vitals and nursing note reviewed.   Constitutional:       Appearance: Normal appearance. She is normal weight. She is not ill-appearing or diaphoretic.      Comments: BMI 37.03   HENT:      Head: Normocephalic and atraumatic.      Right Ear: External ear normal.      Left Ear: External ear normal.      Nose: Nose normal.      Mouth/Throat:      Mouth: Mucous membranes are moist.      Pharynx: Oropharynx is clear.   Eyes:      Conjunctiva/sclera: Conjunctivae normal.      Pupils: Pupils are equal, round, and reactive to light.   Neck:      Thyroid: No thyromegaly.   Cardiovascular:      Rate and Rhythm: Normal rate and regular rhythm.      Pulses: Normal pulses.      Heart sounds: Normal heart sounds.   Pulmonary:      Effort: Pulmonary effort is normal.       Breath sounds: Normal breath sounds.   Abdominal:      General: Abdomen is flat. Bowel sounds are normal. There is no distension.      Tenderness: There is no abdominal tenderness.   Musculoskeletal:         General: Normal range of motion.      Cervical back: Normal range of motion and neck supple.   Skin:     General: Skin is warm and dry.   Neurological:      General: No focal deficit present.      Mental Status: She is oriented to person, place, and time.   Psychiatric:         Mood and Affect: Mood normal.         Assessment / Plan      Assessment/Plan:   There are no diagnoses linked to this encounter.     IBS-D   - rx for Levsin 0.125mg QID PRN for abdominal cramping sent to pharmacy    - previous office notes, hospital records, labs, imaging, endoscopy and pathology reports reviewed with patient    - follow up in clinic after completion of above studies   - call clinic at any time for questions or new / worsened sx      Follow Up:   Return for Next scheduled follow up.    Plan of care reviewed with the patient at the conclusion of today's visit.  Education was provided regarding diagnosis, management, and any prescribed or recommended OTC medications.  Patient verbalized understanding of and agreement with management plan.     NOTE TO PATIENT: The 21st Century Cures Act makes medical notes like these available to patients in the interest of transparency. However, be advised this is a medical document. It is intended as peer to peer communication. It is written in medical language and may contain abbreviations or verbiage that are unfamiliar. It may appear blunt or direct. Medical documents are intended to carry relevant information, facts as evident, and the clinical opinion of the practitioner.     Time Statement:   Discussed plan of care in detail with patient today. Patient verbally understands and agrees. I have spent 30 minutes reviewing available diagnostics, obtaining history, examining the patient,  developing a treatment plan, and educating the patient on disease process and plan of care.     Jill Dias PA-C   [facility] [place] [facility]

## 2024-04-25 DIAGNOSIS — E11.9 TYPE 2 DIABETES MELLITUS WITHOUT COMPLICATION, WITHOUT LONG-TERM CURRENT USE OF INSULIN: ICD-10-CM

## 2024-04-26 RX ORDER — LANCETS 30 GAUGE
EACH MISCELLANEOUS
Qty: 200 EACH | Refills: 5 | OUTPATIENT
Start: 2024-04-26

## 2024-04-30 DIAGNOSIS — R73.03 PREDIABETES: ICD-10-CM

## 2024-05-01 RX ORDER — SITAGLIPTIN 50 MG/1
50 TABLET, FILM COATED ORAL DAILY
Qty: 30 TABLET | Refills: 0 | Status: SHIPPED | OUTPATIENT
Start: 2024-05-01

## 2024-05-07 ENCOUNTER — TELEPHONE (OUTPATIENT)
Dept: INTERNAL MEDICINE | Facility: CLINIC | Age: 29
End: 2024-05-07
Payer: COMMERCIAL

## 2024-05-07 ENCOUNTER — SPECIALTY PHARMACY (OUTPATIENT)
Dept: NEUROLOGY | Facility: CLINIC | Age: 29
End: 2024-05-07
Payer: COMMERCIAL

## 2024-05-07 ENCOUNTER — OFFICE VISIT (OUTPATIENT)
Dept: NEUROLOGY | Facility: CLINIC | Age: 29
End: 2024-05-07
Payer: COMMERCIAL

## 2024-05-07 VITALS
HEART RATE: 118 BPM | SYSTOLIC BLOOD PRESSURE: 130 MMHG | WEIGHT: 209 LBS | DIASTOLIC BLOOD PRESSURE: 86 MMHG | HEIGHT: 63 IN | BODY MASS INDEX: 37.03 KG/M2 | OXYGEN SATURATION: 96 %

## 2024-05-07 DIAGNOSIS — G43.019 INTRACTABLE MIGRAINE WITHOUT AURA AND WITHOUT STATUS MIGRAINOSUS: Primary | ICD-10-CM

## 2024-05-07 PROCEDURE — 99214 OFFICE O/P EST MOD 30 MIN: CPT | Performed by: PSYCHIATRY & NEUROLOGY

## 2024-05-08 ENCOUNTER — OFFICE VISIT (OUTPATIENT)
Dept: INTERNAL MEDICINE | Facility: CLINIC | Age: 29
End: 2024-05-08
Payer: COMMERCIAL

## 2024-05-08 VITALS
HEIGHT: 63 IN | HEART RATE: 92 BPM | TEMPERATURE: 97.7 F | BODY MASS INDEX: 37.17 KG/M2 | RESPIRATION RATE: 16 BRPM | WEIGHT: 209.8 LBS | SYSTOLIC BLOOD PRESSURE: 134 MMHG | DIASTOLIC BLOOD PRESSURE: 84 MMHG

## 2024-05-08 DIAGNOSIS — F41.9 ANXIETY: ICD-10-CM

## 2024-05-08 DIAGNOSIS — E11.9 TYPE 2 DIABETES MELLITUS WITHOUT COMPLICATION, WITHOUT LONG-TERM CURRENT USE OF INSULIN: Primary | ICD-10-CM

## 2024-05-08 DIAGNOSIS — K58.0 IRRITABLE BOWEL SYNDROME WITH DIARRHEA: ICD-10-CM

## 2024-05-08 DIAGNOSIS — R03.0 ELEVATED BLOOD PRESSURE READING: ICD-10-CM

## 2024-05-08 DIAGNOSIS — Z23 NEED FOR PNEUMOCOCCAL VACCINE: ICD-10-CM

## 2024-05-08 DIAGNOSIS — F32.1 MODERATE MAJOR DEPRESSION: ICD-10-CM

## 2024-05-08 LAB
ALBUMIN/CREATININE RATIO, URINE: <30
EXPIRATION DATE: NORMAL
Lab: NORMAL
POC CREATININE URINE: 100
POC MICROALBUMIN URINE: 10

## 2024-05-08 PROCEDURE — 82044 UR ALBUMIN SEMIQUANTITATIVE: CPT | Performed by: NURSE PRACTITIONER

## 2024-05-08 PROCEDURE — 99214 OFFICE O/P EST MOD 30 MIN: CPT | Performed by: NURSE PRACTITIONER

## 2024-06-03 ENCOUNTER — SPECIALTY PHARMACY (OUTPATIENT)
Dept: NEUROLOGY | Facility: CLINIC | Age: 29
End: 2024-06-03
Payer: COMMERCIAL

## 2024-06-03 RX ORDER — FREMANEZUMAB-VFRM 225 MG/1.5ML
225 INJECTION SUBCUTANEOUS
Qty: 1.5 ML | Refills: 11 | Status: SHIPPED | OUTPATIENT
Start: 2024-06-03

## 2024-06-28 ENCOUNTER — OFFICE VISIT (OUTPATIENT)
Dept: INTERNAL MEDICINE | Facility: CLINIC | Age: 29
End: 2024-06-28
Payer: COMMERCIAL

## 2024-06-28 VITALS
TEMPERATURE: 98.2 F | SYSTOLIC BLOOD PRESSURE: 116 MMHG | WEIGHT: 210.6 LBS | HEIGHT: 63 IN | BODY MASS INDEX: 37.32 KG/M2 | RESPIRATION RATE: 16 BRPM | DIASTOLIC BLOOD PRESSURE: 84 MMHG | HEART RATE: 64 BPM

## 2024-06-28 DIAGNOSIS — J01.00 ACUTE NON-RECURRENT MAXILLARY SINUSITIS: Primary | ICD-10-CM

## 2024-06-28 DIAGNOSIS — J02.9 SORE THROAT: ICD-10-CM

## 2024-06-28 DIAGNOSIS — E11.9 TYPE 2 DIABETES MELLITUS WITHOUT COMPLICATION, WITHOUT LONG-TERM CURRENT USE OF INSULIN: ICD-10-CM

## 2024-06-28 LAB
EXPIRATION DATE: NORMAL
INTERNAL CONTROL: NORMAL
Lab: NORMAL
S PYO AG THROAT QL: NEGATIVE

## 2024-06-28 PROCEDURE — 99213 OFFICE O/P EST LOW 20 MIN: CPT | Performed by: NURSE PRACTITIONER

## 2024-06-28 PROCEDURE — 87880 STREP A ASSAY W/OPTIC: CPT | Performed by: NURSE PRACTITIONER

## 2024-06-28 RX ORDER — AMOXICILLIN 875 MG/1
875 TABLET, COATED ORAL 2 TIMES DAILY
Qty: 20 TABLET | Refills: 0 | Status: SHIPPED | OUTPATIENT
Start: 2024-06-28

## 2024-06-28 NOTE — PROGRESS NOTES
Patient Name: Elisabeth Martinez  : 1995   MRN: [id number]     Chief Complaint:    Chief Complaint   Patient presents with    Sore Throat     Right side of throat hurting    Earache     Right ear pain       History of Present Illness: Elisabeth Martinez is a 29 y.o. female.  History of Present Illness  The patient presents for evaluation of multiple medical concerns.    The patient is currently on a regimen of Januvia, which she tolerates well. She has experienced several instances of hypoglycemia, with the lowest recorded reading being 67. This has occurred 2 to 3 times daily.    The patient reports experiencing a sore throat, predominantly on the right side. Additionally, she has been experiencing discomfort in both ears, with the right ear being more affected than the left. She first noticed these symptoms upon awakening one morning. Despite the absence of tonsils, she denies any symptoms of cough, fever, or chills.         Subjective     Review of System: Review of Systems     Medications:     Current Outpatient Medications:     cetirizine (zyrTEC) 10 MG tablet, Take 1 tablet by mouth Daily., Disp: 90 tablet, Rfl: 1    fluticasone (FLONASE) 50 MCG/ACT nasal spray, 2 sprays into the nostril(s) as directed by provider Daily., Disp: 33.3 mL, Rfl: 5    Fremanezumab-vfrm (Ajovy) 225 MG/1.5ML solution auto-injector, Inject 225 mg under the skin into the appropriate area as directed Every 30 (Thirty) Days., Disp: 1.5 mL, Rfl: 11    glucose blood test strip, Check once daily for diabetes. E 11.9, Disp: 500 each, Rfl: 2    glucose monitor monitoring kit, Check once daily for diabetes. E 11.9, Disp: 1 each, Rfl: 0    hydrOXYzine (ATARAX) 10 MG tablet, TAKE ONE TABLET BY MOUTH EVERY 8 HOURS AS NEEDED FOR ITCHING, Disp: 90 tablet, Rfl: 0    hyoscyamine (LEVSIN) 0.125 MG SL tablet, Take 1 tablet by mouth 4 (Four) Times a Day As Needed for Cramping., Disp: 120 tablet, Rfl: 5    L-Methylfolate-Algae (L-Methylfolate  "Forte) 15-90.314 MG capsule capsule, Take 1 capsule by mouth Daily., Disp: 90 capsule, Rfl: 1    Lancets misc, Check once daily for diabetes. E 11.9, Disp: 200 each, Rfl: 5    montelukast (SINGULAIR) 10 MG tablet, TAKE ONE TABLET BY MOUTH ONCE NIGHTLY PATIENT NEEDS AN APPOINTMENT, Disp: 90 tablet, Rfl: 1    multivitamin with minerals tablet tablet, Take 1 tablet by mouth Daily., Disp: , Rfl:     rizatriptan (MAXALT) 10 MG tablet, TAKE 1 TABLET BY MOUTH AT ONSET OF HEADACHE; MAY REPEAT 1 TABLET IN 2 HOURS IF NEEDED., Disp: 10 tablet, Rfl: 3    SITagliptin (Januvia) 50 MG tablet, Take 1 tablet by mouth Daily., Disp: 90 tablet, Rfl: 1    topiramate (Topamax) 50 MG tablet, Take 1.5 tablets by mouth 2 (Two) Times a Day for 30 days., Disp: 90 tablet, Rfl: 6    vilazodone (VIIBRYD) 40 MG tablet tablet, Take 1 tablet by mouth Daily., Disp: 90 tablet, Rfl: 1    vitamin C (ASCORBIC ACID) 250 MG tablet, Take 1 tablet by mouth Daily., Disp: , Rfl:     Vitamin D, Cholecalciferol, 50 MCG (2000 UT) capsule, Take 2 capsules by mouth., Disp: , Rfl:     amoxicillin (AMOXIL) 875 MG tablet, Take 1 tablet by mouth 2 (Two) Times a Day., Disp: 20 tablet, Rfl: 0    Allergies:   No Known Allergies    Objective     Physical Exam:   Vital Signs:   Vitals:    06/28/24 1151   BP: 116/84   BP Location: Right arm   Patient Position: Sitting   Cuff Size: Adult   Pulse: 64   Resp: 16   Temp: 98.2 °F (36.8 °C)   TempSrc: Infrared   Weight: 95.5 kg (210 lb 9.6 oz)   Height: 160 cm (63\")   PainSc:   5     Body mass index is 37.31 kg/m².        Physical Exam  Constitutional:       Appearance: She is not ill-appearing.      Comments: Appears in pain   HENT:      Right Ear: Tympanic membrane normal.      Left Ear: Tympanic membrane normal.      Nose: No congestion or rhinorrhea.      Right Sinus: Maxillary sinus tenderness present.      Mouth/Throat:      Pharynx: Posterior oropharyngeal erythema present.   Cardiovascular:      Rate and Rhythm: Normal " rate and regular rhythm.      Heart sounds: Normal heart sounds.   Pulmonary:      Breath sounds: Normal breath sounds.   Abdominal:      General: Bowel sounds are normal.      Palpations: Abdomen is soft.      Tenderness: There is no abdominal tenderness. There is no right CVA tenderness or left CVA tenderness.   Lymphadenopathy:      Cervical: No cervical adenopathy.       Physical Exam         Results  Laboratory Studies  Lowest blood sugar was 67.     Assessment / Plan      Assessment/Plan:   Diagnoses and all orders for this visit:    1. Acute non-recurrent maxillary sinusitis (Primary)  -     amoxicillin (AMOXIL) 875 MG tablet; Take 1 tablet by mouth 2 (Two) Times a Day.  Dispense: 20 tablet; Refill: 0    2. Sore throat  -     POC Rapid Strep A    3. Type 2 diabetes mellitus without complication, without long-term current use of insulin       Assessment & Plan  1. Sore throat.  A strep test will be conducted to confirm the diagnosis.    2. Diabetes   The patient has been advised to monitor her blood glucose levels closely.    3.Sinusitis  Antibiotic to hold for her trip.   The use of Sudafed has been recommended.         Follow Up:   No follow-ups on file.  Patient or patient representative verbalized consent for the use of Ambient Listening during the visit with  PATRIC Su for chart documentation. 6/28/2024  15:07 EDT    PATRIC Su  [facility]

## 2024-07-03 DIAGNOSIS — F41.9 ANXIETY: ICD-10-CM

## 2024-07-03 RX ORDER — HYDROXYZINE HYDROCHLORIDE 10 MG/1
TABLET, FILM COATED ORAL
Qty: 90 TABLET | Refills: 0 | Status: SHIPPED | OUTPATIENT
Start: 2024-07-03

## 2024-07-18 ENCOUNTER — PATIENT MESSAGE (OUTPATIENT)
Dept: INTERNAL MEDICINE | Facility: CLINIC | Age: 29
End: 2024-07-18
Payer: COMMERCIAL

## 2024-07-18 DIAGNOSIS — E11.9 TYPE 2 DIABETES MELLITUS WITHOUT COMPLICATION, WITHOUT LONG-TERM CURRENT USE OF INSULIN: Primary | ICD-10-CM

## 2024-07-22 RX ORDER — SEMAGLUTIDE 0.68 MG/ML
INJECTION, SOLUTION SUBCUTANEOUS
Qty: 3 ML | Refills: 1 | Status: SHIPPED | OUTPATIENT
Start: 2024-07-22 | End: 2024-09-16

## 2024-07-22 NOTE — TELEPHONE ENCOUNTER
From: Elisabeth Martinez  To: Zully Kirkland  Sent: 7/18/2024 10:01 PM EDT  Subject: Ozempic    Good evening,     I was previously prescribed Ozempic but couldn’t begin taking it because my insurance didn’t cover it at the time as I was pre-diabetic. Now that I’m diabetic, I was wondering if it can be prescribed again to see if insurance will cover it? I’m really struggling with my weight and glucose management despite making healthier choices. I feel a bit discouraged and didn’t know if we could try to get it approved again?     Thank you!

## 2024-08-05 RX ORDER — RIZATRIPTAN BENZOATE 10 MG/1
TABLET ORAL
Qty: 9 TABLET | Refills: 2 | Status: SHIPPED | OUTPATIENT
Start: 2024-08-05

## 2024-08-05 NOTE — TELEPHONE ENCOUNTER
Rx Refill Note  Requested Prescriptions     Pending Prescriptions Disp Refills    rizatriptan (MAXALT) 10 MG tablet [Pharmacy Med Name: RIZATRIPTAN 10 MG TABLET] 9 tablet      Sig: TAKE 1 TABLET BY MOUTH AS NEEDED FOR MIGRAINE; MAY REPEAT 1 TABLET IN 2 HOURS IF NEEDED.      Last filled: 3/4/24 #10 with 3 refills  Last office visit with prescribing clinician: 5/7/2024      Next office visit with prescribing clinician: 11/1/2024     Suhail Bruce MA  08/05/24, 08:38 EDT

## 2024-08-27 ENCOUNTER — TELEMEDICINE (OUTPATIENT)
Dept: INTERNAL MEDICINE | Facility: CLINIC | Age: 29
End: 2024-08-27
Payer: COMMERCIAL

## 2024-08-27 VITALS — WEIGHT: 207 LBS | BODY MASS INDEX: 36.67 KG/M2

## 2024-08-27 DIAGNOSIS — E11.9 TYPE 2 DIABETES MELLITUS WITHOUT COMPLICATION, WITHOUT LONG-TERM CURRENT USE OF INSULIN: ICD-10-CM

## 2024-08-27 PROCEDURE — 99214 OFFICE O/P EST MOD 30 MIN: CPT | Performed by: NURSE PRACTITIONER

## 2024-08-27 RX ORDER — SEMAGLUTIDE 0.68 MG/ML
0.5 INJECTION, SOLUTION SUBCUTANEOUS WEEKLY
Qty: 3 ML | Refills: 1 | Status: SHIPPED | OUTPATIENT
Start: 2024-08-27

## 2024-08-27 NOTE — PROGRESS NOTES
Patient Name: Elisabeth Martinez  : 1995   MRN: [id number]     Chief Complaint:    Chief Complaint   Patient presents with    Weight Loss     Follow up.        History of Present Illness: Elisabeth Martinez is a 29 y.o. female.  History of Present Illness  The patient is here for a follow-up regarding her diabetes management.    She has recently begun a regimen of Ozempic, taking 0.25 mg weekly. This new medication has resulted in post-meal diarrhea. However, she reports no instances of nausea, vomiting, abdominal pain, or neck swelling. She also denies experiencing any symptoms indicative of low blood sugar (hypoglycemia). It's worth noting that she has previously tried metformin and Januvia, but these were not well-tolerated. Over the past month, she has noticed a weight loss of 2 to 3 pounds.     Lab Results   Component Value Date    HGBA1C 6.5 (A) 2024       You have chosen to receive care through a telehealth visit. Patient consents to video/audio connection for your medical care today.   This visit completed via [employer].    Patient is home and provider is at [facility] [place] [id number].                 Subjective     Review of System: Review of Systems     Medications:       Allergies:   No Known Allergies    Objective     Physical Exam:   Vital Signs:   Vitals:    24 1313   Weight: 93.9 kg (207 lb)   PainSc: 0-No pain       Physical Exam  Physical Exam  Patient appears well. Mood is normal.  Respiratory effort in lungs is normal.       Results       Assessment / Plan      Assessment/Plan:   Diagnoses and all orders for this visit:    1. Type 2 diabetes mellitus without complication, without long-term current use of insulin  -     Semaglutide,0.25 or 0.5MG/DOS, (Ozempic, 0.25 or 0.5 MG/DOSE,) 2 MG/3ML solution pen-injector; Inject 0.5 mg under the skin into the appropriate area as directed 1 (One) Time Per Week.  Dispense: 3  mL; Refill: 1       Assessment & Plan  1. Diabetes Mellitus.  She will continue Ozempic 0.25 mg weekly for one more week. A daily over-the-counter Benefiber or fiber supplement will be added to her diet to monitor and manage her diarrhea symptoms. If diarrhea does not improve, an alternative medication will be considered, and she will notify via [employer]. If she tolerates the fiber well, the Ozempic dose may be increased to 0.5 mg weekly. She has lost 2-3 pounds in the last month, and further weight loss is expected starting the second month. Hydration and close monitoring of symptoms such as diarrhea, nausea, and vomiting are emphasized due to the risk of dehydration. Severe symptoms should be reported immediately. Signs of hypoglycemia and their management, including when to check blood sugars, were discussed. She is advised to check her blood sugar levels in the morning while fasting and if she becomes symptomatic. Follow-up is scheduled in 3 months if there are no medication changes; otherwise, she will reach out for further monitoring.    Follow-up  She will follow up in 3 months.           Patient or patient representative verbalized consent for the use of Ambient Listening during the visit with  PATRIC Su for chart documentation. 8/28/2024  13:35 EDT    PATRIC Su  [facility]

## 2024-08-30 RX ORDER — TOPIRAMATE 50 MG/1
TABLET, FILM COATED ORAL
Qty: 90 TABLET | Refills: 2 | Status: SHIPPED | OUTPATIENT
Start: 2024-08-30

## 2024-08-30 NOTE — TELEPHONE ENCOUNTER
Rx Refill Note  Requested Prescriptions     Pending Prescriptions Disp Refills    topiramate (TOPAMAX) 50 MG tablet [Pharmacy Med Name: TOPIRAMATE 50 MG TABLET] 90 tablet 6     Sig: TAKE 1.5 TABLETS BY MOUTH TWICE A DAY      Last filled: 11/7/23 for 7 mos total  Last office visit with prescribing clinician: 5/7/2024      Next office visit with prescribing clinician: 11/1/2024     Suhail Bruce MA  08/30/24, 15:57 EDT

## 2024-09-10 ENCOUNTER — LAB (OUTPATIENT)
Dept: LAB | Facility: HOSPITAL | Age: 29
End: 2024-09-10
Payer: COMMERCIAL

## 2024-09-10 ENCOUNTER — OFFICE VISIT (OUTPATIENT)
Dept: OBSTETRICS AND GYNECOLOGY | Facility: CLINIC | Age: 29
End: 2024-09-10
Payer: COMMERCIAL

## 2024-09-10 VITALS
HEIGHT: 63 IN | SYSTOLIC BLOOD PRESSURE: 124 MMHG | DIASTOLIC BLOOD PRESSURE: 80 MMHG | WEIGHT: 212.6 LBS | BODY MASS INDEX: 37.67 KG/M2

## 2024-09-10 DIAGNOSIS — N97.0 ANOVULATION: ICD-10-CM

## 2024-09-10 DIAGNOSIS — N93.9 ABNORMAL UTERINE BLEEDING (AUB): ICD-10-CM

## 2024-09-10 DIAGNOSIS — Z11.3 SCREENING EXAMINATION FOR STD (SEXUALLY TRANSMITTED DISEASE): ICD-10-CM

## 2024-09-10 DIAGNOSIS — Z01.411 ENCOUNTER FOR GYNECOLOGICAL EXAMINATION WITH ABNORMAL FINDING: Primary | ICD-10-CM

## 2024-09-10 DIAGNOSIS — Z12.4 CERVICAL CANCER SCREENING: ICD-10-CM

## 2024-09-10 LAB
BASOPHILS # BLD AUTO: 0.08 10*3/MM3 (ref 0–0.2)
BASOPHILS NFR BLD AUTO: 0.8 % (ref 0–1.5)
DEPRECATED RDW RBC AUTO: 39 FL (ref 37–54)
EOSINOPHIL # BLD AUTO: 0.14 10*3/MM3 (ref 0–0.4)
EOSINOPHIL NFR BLD AUTO: 1.4 % (ref 0.3–6.2)
ERYTHROCYTE [DISTWIDTH] IN BLOOD BY AUTOMATED COUNT: 13.4 % (ref 12.3–15.4)
HCT VFR BLD AUTO: 38.4 % (ref 34–46.6)
HGB BLD-MCNC: 12.2 G/DL (ref 12–15.9)
IMM GRANULOCYTES # BLD AUTO: 0.05 10*3/MM3 (ref 0–0.05)
IMM GRANULOCYTES NFR BLD AUTO: 0.5 % (ref 0–0.5)
LYMPHOCYTES # BLD AUTO: 2.74 10*3/MM3 (ref 0.7–3.1)
LYMPHOCYTES NFR BLD AUTO: 27.9 % (ref 19.6–45.3)
MCH RBC QN AUTO: 25.5 PG (ref 26.6–33)
MCHC RBC AUTO-ENTMCNC: 31.8 G/DL (ref 31.5–35.7)
MCV RBC AUTO: 80.3 FL (ref 79–97)
MONOCYTES # BLD AUTO: 0.55 10*3/MM3 (ref 0.1–0.9)
MONOCYTES NFR BLD AUTO: 5.6 % (ref 5–12)
NEUTROPHILS NFR BLD AUTO: 6.26 10*3/MM3 (ref 1.7–7)
NEUTROPHILS NFR BLD AUTO: 63.8 % (ref 42.7–76)
NRBC BLD AUTO-RTO: 0 /100 WBC (ref 0–0.2)
PLATELET # BLD AUTO: 300 10*3/MM3 (ref 140–450)
PMV BLD AUTO: 11.9 FL (ref 6–12)
RBC # BLD AUTO: 4.78 10*6/MM3 (ref 3.77–5.28)
TSH SERPL DL<=0.05 MIU/L-ACNC: 0.95 UIU/ML (ref 0.27–4.2)
WBC NRBC COR # BLD AUTO: 9.82 10*3/MM3 (ref 3.4–10.8)

## 2024-09-10 PROCEDURE — 85025 COMPLETE CBC W/AUTO DIFF WBC: CPT

## 2024-09-10 PROCEDURE — 99459 PELVIC EXAMINATION: CPT | Performed by: NURSE PRACTITIONER

## 2024-09-10 PROCEDURE — 99395 PREV VISIT EST AGE 18-39: CPT | Performed by: NURSE PRACTITIONER

## 2024-09-10 PROCEDURE — 36415 COLL VENOUS BLD VENIPUNCTURE: CPT

## 2024-09-10 PROCEDURE — 84443 ASSAY THYROID STIM HORMONE: CPT

## 2024-09-10 PROCEDURE — 99213 OFFICE O/P EST LOW 20 MIN: CPT | Performed by: NURSE PRACTITIONER

## 2024-09-10 PROCEDURE — 84702 CHORIONIC GONADOTROPIN TEST: CPT

## 2024-09-10 NOTE — PROGRESS NOTES
"Chief Complaint  Elisabeth Martinez is a 29 y.o.  female presenting for Annual Exam (Patient states that she has been bleeding since 2024.  Condoms for contraception.  )    History of Present Illness  Elisabeth is a pleasant 30yo, nulligravid woman, here for annual gyn exam, and gyn problem of AUB.  (See CC, above)  She has no past history of any gynecologic surgeries.    Medical history is significant for anxiety/ depression, asthma, hypothyroidism, migraine HA with aura, and PCOS.  She is using condoms for contraception.    States she has the same bleeding \"pattern\" daily:  will awaken without active bldg, but will have intermittent heavy bleeding / passing of large clots, intermittently through the day.  The bleeding will cease, and she will have hours of no bleeding at all.  States, \"It will be gushing or nothing at all.\"  No vaginitis symptoms.  No dyspareunia.  No postcoital bleeding.  She is , and feels that she is in a stable & monogamous relationship.  She is willing to allow for STD screening with the pap smear today.  Her thyroid is monitored and nl TSH / nl CBC in 2024.  Last pap 2021, wnl.  She has failed in the past on IUDs and Nexplanon.  She declines hormonal contraception today.       The following portions of the patient's history were reviewed and updated as appropriate: allergies, current medications, past family history, past medical history, past social history, past surgical history, and problem list.    No Known Allergies        Past Medical History:   Diagnosis Date    Allergic     Anemia     Anxiety     Asthma     Depression     Diabetes mellitus     Hypothyroidism     Kidney stone     Migraine     Obesity     Ovarian cyst     PCOS (polycystic ovarian syndrome)         History reviewed. No pertinent surgical history.    Objective  /80   Ht 160 cm (63\")   Wt 96.4 kg (212 lb 9.6 oz)   LMP 2024 (Exact Date)   Breastfeeding No   BMI 37.66 kg/m² "     Physical Exam  Vitals and nursing note reviewed. Exam conducted with a chaperone present.   Constitutional:       General: She is not in acute distress.     Appearance: Normal appearance. She is not ill-appearing.   HENT:      Head: Normocephalic.   Neck:      Thyroid: No thyroid mass or thyromegaly.   Cardiovascular:      Rate and Rhythm: Normal rate and regular rhythm.      Heart sounds: Normal heart sounds. No murmur heard.  Pulmonary:      Effort: Pulmonary effort is normal. No respiratory distress.      Breath sounds: Normal breath sounds.   Chest:   Breasts:     Right: No inverted nipple, mass or nipple discharge.      Left: No inverted nipple, mass or nipple discharge.   Abdominal:      Palpations: Abdomen is soft. There is no mass.      Tenderness: There is no abdominal tenderness.   Genitourinary:     General: Normal vulva.      Labia:         Right: No rash, tenderness or lesion.         Left: No rash, tenderness or lesion.       Vagina: Normal. No vaginal discharge or erythema.      Cervix: No discharge, lesion or erythema.      Uterus: Not enlarged and not tender.       Adnexa:         Right: No mass or tenderness.          Left: No mass or tenderness.        Comments: Small amt dark bld in vaginal vault.  No polyps of the cervix.  No abnormal discharge from cervix.  No CMT.  Normal bimanual exam.  Anus appears wnl.  No rectal exam performed.  Lymphadenopathy:      Upper Body:      Right upper body: No supraclavicular or axillary adenopathy.      Left upper body: No supraclavicular or axillary adenopathy.   Skin:     General: Skin is warm and dry.   Neurological:      Mental Status: She is alert and oriented to person, place, and time.   Psychiatric:         Mood and Affect: Mood normal.         Behavior: Behavior normal.         Assessment/Plan   Diagnoses and all orders for this visit:    1. Encounter for gynecological examination with abnormal finding (Primary)    2. Cervical cancer screening  -      LIQUID-BASED PAP SMEAR WITH HPV GENOTYPING IF ASCUS ([facility])    3. Screening examination for STD (sexually transmitted disease)  -     LIQUID-BASED PAP SMEAR WITH HPV GENOTYPING IF ASCUS ([facility])    4. Anovulation  -     HCG, B-subunit, Quantitative; Future  -     CBC & Differential; Future  -     TSH; Future    5. Abnormal uterine bleeding (AUB)  -     HCG, B-subunit, Quantitative; Future  -     CBC & Differential; Future  -     TSH; Future  -     US Non-ob Transvaginal; Future    If labs and US look okay, will do cyclic progestin (Provera challenge 10mg x 10 d) q o month to pvt hyperplasia.    Procedures    19 to 39: Counseling/Anticipatory Guidance Discussed: family planning/contraception, sexual behavior and STDs, and breast cancer and self breast exams    Return in about 6 months (around 3/10/2025) for Recheck / FU Bleeding Calendar.    Antonella Dangelo, APRN  09/10/2024

## 2024-09-11 LAB — HCG INTACT+B SERPL-ACNC: <1 MIU/ML

## 2024-09-11 RX ORDER — MEDROXYPROGESTERONE ACETATE 10 MG
10 TABLET ORAL DAILY
Qty: 10 TABLET | Refills: 6 | Status: SHIPPED | OUTPATIENT
Start: 2024-09-11

## 2024-09-16 LAB — REF LAB TEST METHOD: NORMAL

## 2024-09-21 DIAGNOSIS — F41.9 ANXIETY: ICD-10-CM

## 2024-09-23 RX ORDER — HYDROXYZINE HYDROCHLORIDE 10 MG/1
TABLET, FILM COATED ORAL
Qty: 90 TABLET | Refills: 0 | Status: SHIPPED | OUTPATIENT
Start: 2024-09-23

## 2024-09-29 ENCOUNTER — APPOINTMENT (OUTPATIENT)
Dept: CT IMAGING | Facility: HOSPITAL | Age: 29
End: 2024-09-29
Payer: COMMERCIAL

## 2024-09-29 ENCOUNTER — HOSPITAL ENCOUNTER (EMERGENCY)
Facility: HOSPITAL | Age: 29
Discharge: HOME | End: 2024-09-30
Attending: EMERGENCY MEDICINE | Admitting: EMERGENCY MEDICINE
Payer: COMMERCIAL

## 2024-09-29 DIAGNOSIS — R10.9 NONSPECIFIC ABDOMINAL PAIN: ICD-10-CM

## 2024-09-29 DIAGNOSIS — R10.9 BILATERAL FLANK PAIN: Primary | ICD-10-CM

## 2024-09-29 LAB
ALBUMIN SERPL-MCNC: 4.4 G/DL (ref 3.5–5.2)
ALBUMIN/GLOB SERPL: 1.6 G/DL
ALP SERPL-CCNC: 112 U/L (ref 39–117)
ALT SERPL W P-5'-P-CCNC: 18 U/L (ref 1–33)
ANION GAP SERPL CALCULATED.3IONS-SCNC: 12 MMOL/L (ref 5–15)
AST SERPL-CCNC: 15 U/L (ref 1–32)
B-HCG UR QL: NEGATIVE
BASOPHILS # BLD AUTO: 0.08 10*3/MM3 (ref 0–0.2)
BASOPHILS NFR BLD AUTO: 0.8 % (ref 0–1.5)
BILIRUB SERPL-MCNC: 0.2 MG/DL (ref 0–1.2)
BILIRUB UR QL STRIP: NEGATIVE
BUN SERPL-MCNC: 10 MG/DL (ref 6–20)
BUN/CREAT SERPL: 12.8 (ref 7–25)
CALCIUM SPEC-SCNC: 9.1 MG/DL (ref 8.6–10.5)
CHLORIDE SERPL-SCNC: 109 MMOL/L (ref 98–107)
CLARITY UR: CLEAR
CO2 SERPL-SCNC: 23 MMOL/L (ref 22–29)
COLOR UR: YELLOW
CREAT SERPL-MCNC: 0.78 MG/DL (ref 0.57–1)
D-LACTATE SERPL-SCNC: 1.5 MMOL/L (ref 0.5–2)
DEPRECATED RDW RBC AUTO: 41.1 FL (ref 37–54)
EGFRCR SERPLBLD CKD-EPI 2021: 105.6 ML/MIN/1.73
EOSINOPHIL # BLD AUTO: 0.16 10*3/MM3 (ref 0–0.4)
EOSINOPHIL NFR BLD AUTO: 1.6 % (ref 0.3–6.2)
ERYTHROCYTE [DISTWIDTH] IN BLOOD BY AUTOMATED COUNT: 14.2 % (ref 12.3–15.4)
EXPIRATION DATE: NORMAL
GLOBULIN UR ELPH-MCNC: 2.8 GM/DL
GLUCOSE SERPL-MCNC: 106 MG/DL (ref 65–99)
GLUCOSE UR STRIP-MCNC: NEGATIVE MG/DL
HCT VFR BLD AUTO: 37.6 % (ref 34–46.6)
HGB BLD-MCNC: 11.9 G/DL (ref 12–15.9)
HGB UR QL STRIP.AUTO: NEGATIVE
IMM GRANULOCYTES # BLD AUTO: 0.05 10*3/MM3 (ref 0–0.05)
IMM GRANULOCYTES NFR BLD AUTO: 0.5 % (ref 0–0.5)
INTERNAL NEGATIVE CONTROL: NEGATIVE
INTERNAL POSITIVE CONTROL: POSITIVE
KETONES UR QL STRIP: ABNORMAL
LEUKOCYTE ESTERASE UR QL STRIP.AUTO: NEGATIVE
LIPASE SERPL-CCNC: 51 U/L (ref 13–60)
LYMPHOCYTES # BLD AUTO: 3.22 10*3/MM3 (ref 0.7–3.1)
LYMPHOCYTES NFR BLD AUTO: 31.6 % (ref 19.6–45.3)
Lab: NORMAL
MCH RBC QN AUTO: 25.4 PG (ref 26.6–33)
MCHC RBC AUTO-ENTMCNC: 31.6 G/DL (ref 31.5–35.7)
MCV RBC AUTO: 80.2 FL (ref 79–97)
MONOCYTES # BLD AUTO: 0.64 10*3/MM3 (ref 0.1–0.9)
MONOCYTES NFR BLD AUTO: 6.3 % (ref 5–12)
NEUTROPHILS NFR BLD AUTO: 59.2 % (ref 42.7–76)
NEUTROPHILS NFR BLD AUTO: 6.05 10*3/MM3 (ref 1.7–7)
NITRITE UR QL STRIP: NEGATIVE
NRBC BLD AUTO-RTO: 0 /100 WBC (ref 0–0.2)
PH UR STRIP.AUTO: 6 [PH] (ref 5–8)
PLATELET # BLD AUTO: 283 10*3/MM3 (ref 140–450)
PMV BLD AUTO: 11.7 FL (ref 6–12)
POTASSIUM SERPL-SCNC: 3.5 MMOL/L (ref 3.5–5.2)
PROT SERPL-MCNC: 7.2 G/DL (ref 6–8.5)
PROT UR QL STRIP: NEGATIVE
RBC # BLD AUTO: 4.69 10*6/MM3 (ref 3.77–5.28)
SODIUM SERPL-SCNC: 144 MMOL/L (ref 136–145)
SP GR UR STRIP: 1.02 (ref 1–1.03)
UROBILINOGEN UR QL STRIP: ABNORMAL
WBC NRBC COR # BLD AUTO: 10.2 10*3/MM3 (ref 3.4–10.8)

## 2024-09-29 PROCEDURE — 96374 THER/PROPH/DIAG INJ IV PUSH: CPT

## 2024-09-29 PROCEDURE — 25010000002 KETOROLAC TROMETHAMINE PER 15 MG: Performed by: PHYSICIAN ASSISTANT

## 2024-09-29 PROCEDURE — 74177 CT ABD & PELVIS W/CONTRAST: CPT

## 2024-09-29 PROCEDURE — 83690 ASSAY OF LIPASE: CPT | Performed by: PHYSICIAN ASSISTANT

## 2024-09-29 PROCEDURE — 81025 URINE PREGNANCY TEST: CPT | Performed by: PHYSICIAN ASSISTANT

## 2024-09-29 PROCEDURE — 25510000001 IOPAMIDOL 61 % SOLUTION: Performed by: EMERGENCY MEDICINE

## 2024-09-29 PROCEDURE — 85025 COMPLETE CBC W/AUTO DIFF WBC: CPT | Performed by: PHYSICIAN ASSISTANT

## 2024-09-29 PROCEDURE — 83605 ASSAY OF LACTIC ACID: CPT | Performed by: PHYSICIAN ASSISTANT

## 2024-09-29 PROCEDURE — 81003 URINALYSIS AUTO W/O SCOPE: CPT | Performed by: PHYSICIAN ASSISTANT

## 2024-09-29 PROCEDURE — 80053 COMPREHEN METABOLIC PANEL: CPT | Performed by: PHYSICIAN ASSISTANT

## 2024-09-29 PROCEDURE — 99285 EMERGENCY DEPT VISIT HI MDM: CPT

## 2024-09-29 RX ORDER — IOPAMIDOL 612 MG/ML
80 INJECTION, SOLUTION INTRAVASCULAR
Status: COMPLETED | OUTPATIENT
Start: 2024-09-29 | End: 2024-09-29

## 2024-09-29 RX ORDER — SODIUM CHLORIDE 0.9 % (FLUSH) 0.9 %
10 SYRINGE (ML) INJECTION AS NEEDED
Status: DISCONTINUED | OUTPATIENT
Start: 2024-09-29 | End: 2024-09-30 | Stop reason: HOSPADM

## 2024-09-29 RX ORDER — KETOROLAC TROMETHAMINE 15 MG/ML
15 INJECTION, SOLUTION INTRAMUSCULAR; INTRAVENOUS ONCE
Status: COMPLETED | OUTPATIENT
Start: 2024-09-29 | End: 2024-09-29

## 2024-09-29 RX ADMIN — KETOROLAC TROMETHAMINE 15 MG: 15 INJECTION, SOLUTION INTRAMUSCULAR; INTRAVENOUS at 21:16

## 2024-09-29 RX ADMIN — IOPAMIDOL 80 ML: 612 INJECTION, SOLUTION INTRAVENOUS at 22:38

## 2024-09-30 VITALS
RESPIRATION RATE: 20 BRPM | DIASTOLIC BLOOD PRESSURE: 90 MMHG | TEMPERATURE: 98.3 F | WEIGHT: 210 LBS | OXYGEN SATURATION: 94 % | HEIGHT: 63 IN | HEART RATE: 90 BPM | BODY MASS INDEX: 37.21 KG/M2 | SYSTOLIC BLOOD PRESSURE: 137 MMHG

## 2024-09-30 NOTE — ED PROVIDER NOTES
EMERGENCY DEPARTMENT ENCOUNTER    Pt Name: Elisabeth Martinez  MRN: [id number]  Pt :   1995  Room Number:    Date of encounter:  2024  PCP: Zully Kirkland APRN  ED Provider: GREYSON Rojas    Historian: Patient    HPI:  Chief Complaint: Flank Pain    Context: Elisabeth Martinez is a 29 y.o. female who presents to the ED c/o flank pain. Patient reports that she has been experiencing a dull aching pain in her left lower abdomen for the last several weeks. She reports waking up this morning and having pain in her right flank and that the pain now has moved and she is experiencing pain in both flanks. She also reports increased frequency of urination and cloudy urine. She denies any presence of blood in her her urine. She has not had burning or pain with urination. She has not had a fever. She has not been nauseous or experienced vomiting. She reports constipation and notes that she started Ozempic 8 weeks ago for her diabetes. Patient with history of kidney stones. No prior abdominal surgical history.   HPI     REVIEW OF SYSTEMS  A chief complaint appropriate review of systems was completed and is negative except as noted in the HPI.     PAST MEDICAL HISTORY  Past Medical History:   Diagnosis Date    Allergic     Anemia     Anxiety     Asthma     Depression     Diabetes mellitus     Hypothyroidism     Kidney stone     Migraine     Obesity     Ovarian cyst     PCOS (polycystic ovarian syndrome)        PAST SURGICAL HISTORY  No past surgical history on file.    FAMILY HISTORY  Family History   Problem Relation Age of Onset    Thyroid disease Mother     Hypertension Mother     Seizures Mother     Arthritis Mother     Other Mother         Multiple Sclerosis    Cancer Father     Heart disease Maternal Grandmother     Stroke Maternal Grandmother     Kidney disease Maternal Grandfather     Cancer Maternal Grandfather     Diabetes Maternal Grandfather     Diabetes Paternal Grandfather     Cancer  Paternal Grandfather     Kidney disease Paternal Grandfather        SOCIAL HISTORY  Social History     Socioeconomic History    Marital status:    Tobacco Use    Smoking status: Never     Passive exposure: Never    Smokeless tobacco: Never   Vaping Use    Vaping status: Never Used   Substance and Sexual Activity    Alcohol use: Yes     Comment: RARELY    Drug use: Never    Sexual activity: Yes     Partners: Male     Birth control/protection: Condom       ALLERGIES  Patient has no known allergies.    PHYSICAL EXAM  Physical Exam  Vitals and nursing note reviewed.   Constitutional:       General: She is not in acute distress.     Appearance: Normal appearance. She is not ill-appearing or toxic-appearing.   HENT:      Head: Normocephalic and atraumatic.      Nose: Nose normal.      Mouth/Throat:      Mouth: Mucous membranes are moist.   Eyes:      Extraocular Movements: Extraocular movements intact.   Cardiovascular:      Rate and Rhythm: Normal rate.   Pulmonary:      Effort: Pulmonary effort is normal.   Abdominal:      General: There is no distension.      Tenderness: There is abdominal tenderness. There is no guarding or rebound.   Musculoskeletal:         General: Normal range of motion.      Cervical back: Normal range of motion and neck supple.   Skin:     General: Skin is warm and dry.   Neurological:      General: No focal deficit present.      Mental Status: She is alert.   Psychiatric:         Mood and Affect: Mood normal.         Behavior: Behavior normal.       LAB RESULTS  Results for orders placed or performed during the hospital encounter of 09/29/24   Comprehensive Metabolic Panel    Specimen: Blood   Result Value Ref Range    Glucose 106 (H) 65 - 99 mg/dL    BUN 10 6 - 20 mg/dL    Creatinine 0.78 0.57 - 1.00 mg/dL    Sodium 144 136 - 145 mmol/L    Potassium 3.5 3.5 - 5.2 mmol/L    Chloride 109 (H) 98 - 107 mmol/L    CO2 23.0 22.0 - 29.0 mmol/L    Calcium 9.1 8.6 - 10.5 mg/dL    Total Protein  7.2 6.0 - 8.5 g/dL    Albumin 4.4 3.5 - 5.2 g/dL    ALT (SGPT) 18 1 - 33 U/L    AST (SGOT) 15 1 - 32 U/L    Alkaline Phosphatase 112 39 - 117 U/L    Total Bilirubin 0.2 0.0 - 1.2 mg/dL    Globulin 2.8 gm/dL    A/G Ratio 1.6 g/dL    BUN/Creatinine Ratio 12.8 7.0 - 25.0    Anion Gap 12.0 5.0 - 15.0 mmol/L    eGFR 105.6 >60.0 mL/min/1.73   Lipase    Specimen: Blood   Result Value Ref Range    Lipase 51 13 - 60 U/L   Urinalysis With Microscopic If Indicated (No Culture) - Urine, Clean Catch    Specimen: Urine, Clean Catch   Result Value Ref Range    Color, UA Yellow Yellow, Straw    Appearance, UA Clear Clear    pH, UA 6.0 5.0 - 8.0    Specific Gravity, UA 1.025 1.001 - 1.030    Glucose, UA Negative Negative    Ketones, UA Trace (A) Negative    Bilirubin, UA Negative Negative    Blood, UA Negative Negative    Protein, UA Negative Negative    Leuk Esterase, UA Negative Negative    Nitrite, UA Negative Negative    Urobilinogen, UA 1.0 E.U./dL 0.2 - 1.0 E.U./dL   Lactic Acid, Plasma    Specimen: Blood   Result Value Ref Range    Lactate 1.5 0.5 - 2.0 mmol/L   CBC Auto Differential    Specimen: Blood   Result Value Ref Range    WBC 10.20 3.40 - 10.80 10*3/mm3    RBC 4.69 3.77 - 5.28 10*6/mm3    Hemoglobin 11.9 (L) 12.0 - 15.9 g/dL    Hematocrit 37.6 34.0 - 46.6 %    MCV 80.2 79.0 - 97.0 fL    MCH 25.4 (L) 26.6 - 33.0 pg    MCHC 31.6 31.5 - 35.7 g/dL    RDW 14.2 12.3 - 15.4 %    RDW-SD 41.1 37.0 - 54.0 fl    MPV 11.7 6.0 - 12.0 fL    Platelets 283 140 - 450 10*3/mm3    Neutrophil % 59.2 42.7 - 76.0 %    Lymphocyte % 31.6 19.6 - 45.3 %    Monocyte % 6.3 5.0 - 12.0 %    Eosinophil % 1.6 0.3 - 6.2 %    Basophil % 0.8 0.0 - 1.5 %    Immature Grans % 0.5 0.0 - 0.5 %    Neutrophils, Absolute 6.05 1.70 - 7.00 10*3/mm3    Lymphocytes, Absolute 3.22 (H) 0.70 - 3.10 10*3/mm3    Monocytes, Absolute 0.64 0.10 - 0.90 10*3/mm3    Eosinophils, Absolute 0.16 0.00 - 0.40 10*3/mm3    Basophils, Absolute 0.08 0.00 - 0.20 10*3/mm3    Immature  Grans, Absolute 0.05 0.00 - 0.05 10*3/mm3    nRBC 0.0 0.0 - 0.2 /100 WBC   POC Urine Pregnancy    Specimen: Urine   Result Value Ref Range    HCG, Urine, QL Negative Negative    Lot Number [id number]     Internal Positive Control Positive Positive, Passed    Internal Negative Control Negative Negative, Passed    Expiration Date 2025-01-28        If labs were ordered, I independently reviewed the results and considered them in treating the patient.    RADIOLOGY  CT Abdomen Pelvis With Contrast   Final Result   Impression:   1.No acute abdominal or pelvic abnormality.   2.Multiple small hepatic cysts.   3.The cecum is located in the left lower quadrant. The appendix is not clearly seen.            Electronically Signed: Felipe Machado MD     9/29/2024 11:32 PM EDT     Workstation ID: [id number]        [x] Radiologist's Report Reviewed:  I ordered and independently interpreted the above noted radiographic studies.  See radiologist's dictation for official interpretation.      PROCEDURES    Procedures    No orders to display       MEDICATIONS GIVEN IN ER    Medications   sodium chloride 0.9 % flush 10 mL (has no administration in time range)   ketorolac (TORADOL) injection 15 mg (15 mg Intravenous Given 9/29/24 2116)   iopamidol (ISOVUE-300) 61 % injection 80 mL (80 mL Intravenous Given 9/29/24 2238)       MEDICAL DECISION MAKING, PROGRESS, and CONSULTS   Medical Decision Making  Nontoxic-appearing 29-year-old female presents the ED with complaints of bilateral flank pain.  Patient reports history of kidney stones and symptoms consistent with prior kidney stones.  No acute or emergent findings demonstrated on physical exam.  Labs/UA without acute or emergent abnormalities.  CT scan of the abdomen pelvis demonstrates no acute abdominal or pelvic abnormalities.Based on clinical presentation and workup in ED including labs and imaging, no clear indication for treatment beyond symptomatic management.  Patient instructed on  symptomatic care and outpatient follow up to primary care; return precautions provided.     Problems Addressed:  Bilateral flank pain: complicated acute illness or injury  Nonspecific abdominal pain: complicated acute illness or injury    Amount and/or Complexity of Data Reviewed  External Data Reviewed: notes.     Details: Personally viewed patient's primary care visits to Dr. Kirkland on May 8, 2024 as well as telemedicine appointment on August 27, 2024 patient was evaluated for diabetes.  Patient started on semaglutide on August 27, 2024.  Labs: ordered. Decision-making details documented in ED Course.  Radiology: ordered and independent interpretation performed. Decision-making details documented in ED Course.    Risk  Prescription drug management.      Discussion below represents my analysis of pertinent findings related to patient's condition, differential diagnosis, treatment plan and final disposition.    Differential diagnosis:  The differential diagnosis associated with the patient's presentation includes: Dyspepsia, viral gastroenteritis, constipation, medication side effects, irritable bowel syndrome, abdominal wall pain, gallbladder disease, pancreatitis, diverticulitis, appendicitis, kidney stone, UTI, hernia, gastroparesis, bowel obstruction, colitis and others.      Additional sources  Discussed/ obtained information from independent historians:   [] Spouse  [] Parent  [] Family member  [] Friend  [] EMS   [] Other:  External (non-ED) record review:   [] Inpatient record:   [] Office record:   [] Outpatient record:   [] Prior Outpatient labs:   [] Prior Outpatient radiology:   [] Primary Care record:   [] Outside ED record:   [] Other:   Patient's care impacted by:   [x] Diabetes  [] Hypertension  [] Hyperlipidemia  [] Hypothyroidism   [] Coronary Artery Disease   [] COPD   [] Cancer   [x] Obesity  [] GERD   [] Tobacco Abuse   [] Substance Abuse    [] Anxiety   [] Depression   [x] Other: PCOS  Care  significantly affected by Social Determinants of Health (housing and economic circumstances, unemployment)    [] Yes     [x] No   If yes, Patient's care significantly limited by  Social Determinants of Health including:   [] Inadequate housing   [] Low income   [] Alcoholism and drug addiction in family   [] Problems related to primary support group   [] Unemployment   [] Problems related to employment   [] Other Social Determinants of Health:     Orders placed during this visit:  Orders Placed This Encounter   Procedures    CT Abdomen Pelvis With Contrast    Comprehensive Metabolic Panel    Lipase    Urinalysis With Microscopic If Indicated (No Culture) - Urine, Clean Catch    Lactic Acid, Plasma    CBC Auto Differential    POC Urine Pregnancy    Insert Peripheral IV    CBC & Differential       I considered prescription management  with:   [x] Pain medication: The use of prescription pain medication was considered in this patient however not implemented as risks outweigh benefits of prescription pain management and it was felt patient's symptoms could be managed with OTC anti-inflammatory medications and Tylenol. Will defer prescribed pain medication to primary care and/or pain management.    [] Antiviral  [x] Antibiotic: Clinical presentation and ER workup without evidence of bacterial infection requiring treatment with antibiotics.     [] Other:   Rationale:    ED Course:    ED Course as of 09/30/24 0029   Sun Sep 29, 2024   2042 Vitals and Telemetry tracing was reviewed and directly interpreted by myself demonstrating blood pressure 134/96, afebrile, heart rate of 116, respirations of 20 breaths/min and oxygen saturation 98% on room air [JG]   2042 BP: 134/96 [JG]   2042 Temp: 98.3 °F (36.8 °C) [JG]   2042 Heart Rate: 116 [JG]   2042 Resp: 20 [JG]   2042 SpO2: 98 % [JG]   2325 On reassessment at bedside patient resting in position of comfort in no acute distress. [JG]   2338 CT abdomen/pelvis personally  interpreted by myself and with official read provided by radiology demonstrates no acute abdominal or pelvic abnormality. [JG]   Mon Sep 30, 2024   0021 Labs studies were reviewed and directly interpreted by myself and demonstrated no acute or emergent abnormalities.   [JG]      ED Course User Index  [JG] Abimael Dupont PA          DIAGNOSIS  Final diagnoses:   Bilateral flank pain   Nonspecific abdominal pain     DISPOSITION    ED Disposition       ED Disposition   Discharge    Condition   Stable    Comment   --           DISCHARGE    Patient discharged in stable condition.    Reviewed implications of results, diagnosis, meds, responsibility to follow up, warning signs and symptoms of possible worsening, potential complications and reasons to return to ER.    Patient/Family voiced understanding of above instructions.    Discussed plan for discharge, as there is no emergent indication for admission.  Pt/family is agreeable and understands need for follow up and possible repeat testing.  Pt/family is aware that discharge does not mean that nothing is wrong but that it indicates no emergency is currently present that requires admission and they must continue care with follow-up as given below or with a physician of their choice.     FOLLOW-UP  Zully Kirkland APRN  [address]  [address]  [facility] [address] [phone]    Call   Call for follow up with Primary Care    [facility] EMERGENCY DEPARTMENT  [address] [facility] [address] [phone]  Go to   If symptoms worsen         Medication List      No changes were made to your prescriptions during this visit.          Please note that portions of this document were completed with voice recognition software.        Abimael Dupont PA  09/30/24 0029

## 2024-10-04 ENCOUNTER — TELEPHONE (OUTPATIENT)
Dept: INTERNAL MEDICINE | Facility: CLINIC | Age: 29
End: 2024-10-04
Payer: COMMERCIAL

## 2024-10-04 NOTE — TELEPHONE ENCOUNTER
If patient has tolerated 4 weeks of Ozempic 0.25 mg, she can increase dose to 0.5 mg weekly.  Please monitor for abdominal pain, nausea and constipation.  Let me know if she develops any symptoms.

## 2024-10-04 NOTE — TELEPHONE ENCOUNTER
Caller: Elisabeth Martinez    Relationship: Self    Best call back number: [phone]     What was the call regarding: PATIENT IS DUE HER SHOT TODAY FOR OZEMPIC. SHE WOULD LIKE TO KNOW IF SHE NEEDS TO INCREASE THE DOSE. SHE DOESN'T NEED A REFILL AS SHE STILL HAS PLENTY LEFT OVER.    Is it okay if the provider responds through MyChart: NO

## 2024-10-04 NOTE — TELEPHONE ENCOUNTER
Patient notified and verbalized understanding.  Patient states she has been doing the 0.5mg weekly for 4 weeks. Should she increase to 1mg?

## 2024-10-07 ENCOUNTER — TELEPHONE (OUTPATIENT)
Dept: OBSTETRICS AND GYNECOLOGY | Facility: CLINIC | Age: 29
End: 2024-10-07
Payer: COMMERCIAL

## 2024-10-07 NOTE — TELEPHONE ENCOUNTER
Patient wants to know if it is ok to take an hydrocodone prior to having an endo bx tomorrow.  She had some left from a root canal a couple of weeks ago.  Please advise

## 2024-10-08 ENCOUNTER — OFFICE VISIT (OUTPATIENT)
Dept: OBSTETRICS AND GYNECOLOGY | Facility: CLINIC | Age: 29
End: 2024-10-08
Payer: COMMERCIAL

## 2024-10-08 VITALS
WEIGHT: 210 LBS | BODY MASS INDEX: 37.21 KG/M2 | HEIGHT: 63 IN | DIASTOLIC BLOOD PRESSURE: 80 MMHG | SYSTOLIC BLOOD PRESSURE: 110 MMHG

## 2024-10-08 DIAGNOSIS — N93.9 ABNORMAL UTERINE BLEEDING (AUB): Primary | ICD-10-CM

## 2024-10-08 PROCEDURE — 58100 BIOPSY OF UTERUS LINING: CPT | Performed by: NURSE PRACTITIONER

## 2024-10-08 RX ORDER — CHLORCYCLIZINE HYDROCHLORIDE AND PSEUDOEPHEDRINE HYDROCHLORIDE 25; 60 MG/1; MG/1
1 TABLET ORAL AS NEEDED
COMMUNITY
Start: 2024-09-10

## 2024-10-08 RX ORDER — HYDROCODONE BITARTRATE AND ACETAMINOPHEN 5; 325 MG/1; MG/1
1 TABLET ORAL AS NEEDED
COMMUNITY
Start: 2024-09-10

## 2024-10-08 NOTE — PROGRESS NOTES
"Chief Complaint  Elisabeth Martinez is a 29 y.o.  female presenting for Procedure (Endometrial biopsy. )    History of Present Illness  Elisabeth is a 30yo, nulligravid young woman, here for follow up of AUB (since  ---- until Oct 2nd).    Having light brown spotting since Oct 2.  She uses condoms for contraception.  She has no past history of any gynecologic surgeries.  Her medical history is significant for anxiety and depression, asthma, hypothyroidism, migraines with aura, and PCOS.      States she has the same bleeding \"pattern\" daily: will awaken without active bldg, but will have intermittent heavy bleeding / passing of large clots, intermittently through the day.   The bleeding will cease, and she will have hours of no bleeding at all. States, \"It will be gushing or nothing at all.\"     No vaginitis symptoms. No dyspareunia. No postcoital bleeding.   TSH , CBC both wnl in 2024.  Pap smear was wnl 9/10/24, and all STD screening nl.  Pelvic US revealed a 12.7 mm EMS, and an arcuate uterus.  Ovaries appeared bilat polycystic.  She has tried to use IUDs and Nexplanon in the past, but failed on them.    The following portions of the patient's history were reviewed and updated as appropriate:   No Known Allergies        Past Medical History:   Diagnosis Date    Allergic     Anemia     Anxiety     Asthma     Depression     Diabetes mellitus     Hypothyroidism     Kidney stone     Migraine     Obesity     Ovarian cyst     PCOS (polycystic ovarian syndrome)         History reviewed. No pertinent surgical history.    Objective  /80   Ht 160 cm (63\")   Wt 95.3 kg (210 lb)   LMP 2024 (Exact Date)   Breastfeeding No   BMI 37.20 kg/m²     Physical Exam  Vitals and nursing note reviewed.   Constitutional:       General: She is not in acute distress.     Appearance: Normal appearance. She is not ill-appearing.   Skin:     General: Skin is warm and dry.   Neurological:      Mental Status: " She is alert and oriented to person, place, and time.   Psychiatric:         Mood and Affect: Mood normal.         Behavior: Behavior normal.       Assessment/Plan   Diagnoses and all orders for this visit:    1. Abnormal uterine bleeding (AUB) (Primary)  -     Endometrial Biopsy  -     TISSUE EXAM, [facility] ([facility])        Endometrial Biopsy    Date/Time: 10/8/2024 11:45 AM    Performed by: Antonella Dangelo APRN  Authorized by: Antonella Dangelo APRN    Consent:     Consent obtained: written    Consent given by: patient    Risks discussed: bleeding, conversion to surgery, infection, need for repeat procedure and pain    Alternatives discussed: observation, referral and alternative treatment    Patient agrees, verbalizes understanding, and wants to proceed: yes    Indications:     Indications: abnormal uterine bleeding      Progression of post-menopausal bleeding: partially resolved  Procedure:     A bimanual exam was performed: no      Prepped with: Betadine    Tenaculum used: yes      A local block was performed: no      Number of passes: 1  Findings:     Cervix: normal      Uterus depth by sound (cm): 9    Specimen collected: specimen collected and sent to pathology    Comments:     Procedure comments: Consent form was discussed and signed by pt.  Time out was observed.  Under sterile conditions and according to customary protocol, an EMBx was taken.  Pt tolerated well, and left the office in good condition.      Return for Next scheduled follow up.    PATRIC Trammell  10/08/2024

## 2024-10-09 LAB — REF LAB TEST METHOD: NORMAL

## 2024-10-17 DIAGNOSIS — J30.2 SEASONAL ALLERGIES: ICD-10-CM

## 2024-10-17 DIAGNOSIS — J45.20 MILD INTERMITTENT ASTHMA WITHOUT COMPLICATION: ICD-10-CM

## 2024-10-17 RX ORDER — MONTELUKAST SODIUM 10 MG/1
TABLET ORAL
Qty: 90 TABLET | Refills: 1 | Status: SHIPPED | OUTPATIENT
Start: 2024-10-17

## 2024-10-21 DIAGNOSIS — E11.9 TYPE 2 DIABETES MELLITUS WITHOUT COMPLICATION, WITHOUT LONG-TERM CURRENT USE OF INSULIN: ICD-10-CM

## 2024-10-22 RX ORDER — SEMAGLUTIDE 0.68 MG/ML
0.5 INJECTION, SOLUTION SUBCUTANEOUS WEEKLY
Qty: 3 ML | Refills: 0 | Status: SHIPPED | OUTPATIENT
Start: 2024-10-22

## 2024-10-31 DIAGNOSIS — F32.1 MODERATE MAJOR DEPRESSION: ICD-10-CM

## 2024-10-31 RX ORDER — VILAZODONE HYDROCHLORIDE 40 MG/1
40 TABLET ORAL DAILY
Qty: 90 TABLET | Refills: 0 | Status: SHIPPED | OUTPATIENT
Start: 2024-10-31

## 2024-11-01 ENCOUNTER — OFFICE VISIT (OUTPATIENT)
Dept: NEUROLOGY | Facility: CLINIC | Age: 29
End: 2024-11-01
Payer: COMMERCIAL

## 2024-11-01 VITALS
OXYGEN SATURATION: 98 % | HEART RATE: 122 BPM | HEIGHT: 63 IN | DIASTOLIC BLOOD PRESSURE: 82 MMHG | BODY MASS INDEX: 37.21 KG/M2 | SYSTOLIC BLOOD PRESSURE: 124 MMHG

## 2024-11-01 DIAGNOSIS — G43.019 INTRACTABLE MIGRAINE WITHOUT AURA AND WITHOUT STATUS MIGRAINOSUS: Primary | ICD-10-CM

## 2024-11-01 DIAGNOSIS — E11.9 TYPE 2 DIABETES MELLITUS WITHOUT COMPLICATION, WITHOUT LONG-TERM CURRENT USE OF INSULIN: ICD-10-CM

## 2024-11-01 PROCEDURE — 99214 OFFICE O/P EST MOD 30 MIN: CPT | Performed by: PSYCHIATRY & NEUROLOGY

## 2024-11-01 RX ORDER — LANCETS 30 GAUGE
EACH MISCELLANEOUS
Qty: 200 EACH | Refills: 5 | Status: SHIPPED | OUTPATIENT
Start: 2024-11-01

## 2024-11-01 RX ORDER — RIZATRIPTAN BENZOATE 10 MG/1
10 TABLET ORAL ONCE AS NEEDED
Qty: 12 TABLET | Refills: 3 | Status: SHIPPED | OUTPATIENT
Start: 2024-11-01

## 2024-11-01 RX ORDER — PROPRANOLOL HYDROCHLORIDE 60 MG/1
60 CAPSULE, EXTENDED RELEASE ORAL DAILY
Qty: 30 CAPSULE | Refills: 4 | Status: SHIPPED | OUTPATIENT
Start: 2024-11-01 | End: 2024-12-01

## 2024-11-01 NOTE — PROGRESS NOTES
Subjective:    CC: Elisabeth Martinez is in clinic today for follow up for history of migraines.    HPI:  Follow-up: 7/10/2019: She is in clinic for regular follow-up.  Since her last visit, her insurance did approve Aimovig 140 mg subcutaneous injection.  She has done total of 2 injections since her last visit and reports that she is doing really well.  She has had only one headache in the last 2 months for recheck to take sumatriptan.  She is tolerating Aimovig well without any side effects.    Follow-up: 1/4/2021: She is in clinic for follow-up after July 2019.  She reports that she ran out of her Aimovig in August 2020 and her primary care physician's office could not refill it so since then she is not taking.  Since running out of Aimovig in August, she reports that the migraine intensity and frequency have increased and currently she is experiencing 8-9 breakthrough migraine in a month.  While on Aimovig, it would reduce to 1-2 migraines in a month.  She is currently taking sumatriptan 100 mg as needed as an abortive treatment and it is working well.  She wants to go back on Aimovig.    5/6/2021: She is in clinic for regular follow-up.  Since her last visit in January, she has now restarted Aimovig monthly injections and have done total 3 injections.  With Aimovig, she has again had excellent response and the migraine intensity and frequency has reduced to 1-2 migraines in a month.  She is using Imitrex 100 mg as needed as an abortive treatment and it works well.    2/2/2022: She is in clinic for regular follow-up.  Since her last visit in May 2021, she reports that after August 2021, insurance stopped covering Aimovig so she has not done any more injections since August.  She reports that the migraines have increased in frequency and intensity while with on average 10-12 migraine days in a month.  She is taking sumatriptan frequently and possibly has developed a rebound headaches as well.  She reports that  Imitrex does work well as an abortive treatment.    7/20/2022: She is in clinic for regular follow-up.  Since her last visit in February 2022, she reports that insurance approved Ajovy until May 2022 after which she is not getting it anymore.  Ajovy helped her significantly in keeping migraines under good control with on an average migraine frequency of 3-4 migraine days in a month.  However since May, it has increased to 10-14 migraine days in a month.  She has not been able to get Ajovy since then.    9/14/2022: She is in clinic for regular follow-up.  Since her last visit in July 2022, she has been taking Topamax 50 mg twice daily as insurance denied Ajovy and she reports that she has had excellent response.  She is reporting 1-2 breakthrough migraines in a month with Topamax use.  She has experienced some numbness and tingling involving both her hands and feet but is tolerable.  She is currently using Imitrex as needed as an abortive treatment but many times after the migraine gets better after taking Imitrex, she will get pain in the neck and shoulder area.     5/11/2023: She is in clinic for regular follow-up.  Since her last visit in September 2022, she reports that except for 1 bad week in January, she has done really well and migraines remain under excellent control with on an average 1-2 breakthrough migraines in a month responding well to Maxalt as an abortive treatment.  She denies any side effects with this combination.    11/7/2023: She is in clinic for regular follow-up.  Since her last visit in May 2023, she has noticed increase in migraine frequency.  She is currently experiencing 2 breakthrough migraines in a week.  She has also noticed increase in frequency and intensity during menstrual cycle.  She has been taking Topamax 50 mg twice daily and is tolerating it well without any side effects.  She reports that Maxalt works really well as an abortive treatment.    5/7/2024: She is in clinic for  regular follow-up.  Since her last visit in November 2023, she reports that she has been taking Topamax 75 mg twice daily but reports no further decrease in migraine frequency and intensity.  She continues to experience 2-3 breakthrough migraines in a week.  In the past she was on Aimovig followed by Ajovy and both of them were really effective in keeping migraines under control.  Maxalt sometimes works and sometimes it does not.    11/1/2024: She is in clinic for regular follow-up.  Since her last visit in May 2024, she is now on Ajovy once a month injection.  She reports that it has helped in bringing down the migraine frequency and intensity.  She is currently reporting at least 6-8 breakthrough migraines in a month.  She also takes Topamax 75 mg twice a day and reports word finding difficulty with Topamax use.  She is taking Maxalt as needed as an abortive treatment and it does work.        The following portions of the patient's history were reviewed and updated as of 11/01/2024: allergies, social history, and problem list.        Past Medical History:   Diagnosis Date    Allergic     Anemia     Anxiety     Asthma     Depression     Diabetes mellitus     Hypothyroidism     Kidney stone     Migraine     Obesity     Ovarian cyst     PCOS (polycystic ovarian syndrome)       No past surgical history on file.   Family History   Problem Relation Age of Onset    Thyroid disease Mother     Hypertension Mother     Seizures Mother         Childhood seizures, not currently    Arthritis Mother     Migraines Mother     Multiple sclerosis Mother     Cancer Father     Heart disease Maternal Grandmother     Stroke Maternal Grandmother     Kidney disease Maternal Grandfather     Cancer Maternal Grandfather     Diabetes Maternal Grandfather     Diabetes Paternal Grandfather     Cancer Paternal Grandfather     Kidney disease Paternal Grandfather     Dementia Paternal Grandmother     Stroke Paternal Grandmother         Review of  "Systems  Objective:    /82   Pulse (!) 122   Ht 160 cm (62.99\")   LMP 06/20/2024 (Exact Date)   SpO2 98%   BMI 37.21 kg/m²     Neurology Exam:  General apperance: NAD.     Mental status: Alert, awake and oriented to time place and person.    Language and Speech: No aphasia or dysarthria.    CN II to XII: Intact.    Opthalmoscopic Exam: No papilledema.    Motor:  Right UE muscle strength 5/5. Normal tone.     Left UE muscle strength 5/5. Normal tone.      Right LE muscle strength 5/5. Normal tone.     Left LE muscle strength 5/5. Normal tone.      Sensory: Normal light touch, vibration and pinprick sensation bilaterally.    DTRs: 2+ bilaterally.    Babinski: Negative bilaterally.    Co-ordination: Normal finger-to-nose, heel to shin B/L.    Rhomberg: Negative.    Gait: Normal.    Cardiovascular: Regular rate and rhythm without murmur, gallop or rub.    Assessment and Plan:  1. Intractable migraine without aura and without status migrainosus  Overall migraine frequency and intensity has improved since restarting Ajovy but she is still experiencing 6-8 migraine days in a month.  She is reporting side effects of Topamax mainly word finding difficulties so I am going to taper off of Topamax and instead start her on Inderal long-acting 60 mg daily.  It will help her with her tachycardia, anxiety and also hopefully reduce migraine frequency and intensity.  Continue to use Maxalt as needed as an abortive treatment and I will see her back in clinic in 3 months for follow-up.      I spent 30 minutes in patient care: Reviewing records prior to the visit, entering orders and documentation and spent more than calvo 50% of this time face-to-face in management, instructions and education regarding above mentioned diagnosis and also on counseling and discussing about taking medication regularly, possible side effects with medication use, importance of good sleep hygiene, good hydration and regular exercise.    No " follow-ups on file.       Note to patient: The 21st Century Cures Act makes medical notes like these available to patients in the interest of transparency. However, be advised this is a medical document. It is intended as peer to peer communication. It is written in medical language and may contain abbreviations or verbiage that are unfamiliar. It may appear blunt or direct. Medical documents are intended to carry relevant information, facts as evident, and the clinical opinion of the physician.

## 2024-11-21 DIAGNOSIS — E11.9 TYPE 2 DIABETES MELLITUS WITHOUT COMPLICATION, WITHOUT LONG-TERM CURRENT USE OF INSULIN: ICD-10-CM

## 2024-11-21 RX ORDER — SEMAGLUTIDE 0.68 MG/ML
0.5 INJECTION, SOLUTION SUBCUTANEOUS WEEKLY
Qty: 3 ML | Refills: 0 | Status: SHIPPED | OUTPATIENT
Start: 2024-11-21

## 2024-11-26 ENCOUNTER — OFFICE VISIT (OUTPATIENT)
Dept: INTERNAL MEDICINE | Facility: CLINIC | Age: 29
End: 2024-11-26
Payer: COMMERCIAL

## 2024-11-26 VITALS
SYSTOLIC BLOOD PRESSURE: 124 MMHG | HEART RATE: 76 BPM | WEIGHT: 210 LBS | DIASTOLIC BLOOD PRESSURE: 96 MMHG | RESPIRATION RATE: 16 BRPM | BODY MASS INDEX: 37.21 KG/M2 | TEMPERATURE: 96.9 F | HEIGHT: 63 IN

## 2024-11-26 DIAGNOSIS — Z13.220 ENCOUNTER FOR LIPID SCREENING FOR CARDIOVASCULAR DISEASE: ICD-10-CM

## 2024-11-26 DIAGNOSIS — E11.9 TYPE 2 DIABETES MELLITUS WITHOUT COMPLICATION, WITHOUT LONG-TERM CURRENT USE OF INSULIN: Primary | ICD-10-CM

## 2024-11-26 DIAGNOSIS — Z13.6 ENCOUNTER FOR LIPID SCREENING FOR CARDIOVASCULAR DISEASE: ICD-10-CM

## 2024-11-26 LAB
EXPIRATION DATE: NORMAL
HBA1C MFR BLD: 5.6 % (ref 4.5–5.7)
Lab: NORMAL

## 2024-11-26 NOTE — PATIENT INSTRUCTIONS
Comprehensive Weight Loss Plan  1. Food Diary:  Objective: Track daily food intake to identify eating patterns, monitor calorie intake, and ensure adherence to dietary goals.  Action Steps:  Record every meal, snack, and beverage consumed.  Note the time of consumption and portion sizes.  Include details on macronutrients (carbohydrates, proteins, fats) if possible.  Reflect on hunger levels and emotional state when eating.  2. Calorie Limiting:  Objective: Create a caloric deficit to promote weight loss.  Action Steps:  Calculate your daily caloric needs based on age, gender, weight, height, and activity level.  Aim to reduce daily calorie intake by 500-750 calories to lose approximately 1-1.5 pounds per week.  Use a calorie tracking jame or the food diary to monitor daily intake.  3. Low Carbohydrate Diet:  Objective: Reduce carbohydrate intake to promote fat burning and stabilize blood sugar levels.  Action Steps:  Limit daily carbohydrate intake to  grams, depending on individual tolerance.  Focus on consuming non-starchy vegetables, lean proteins, and healthy fats.  Avoid refined grains, sugary foods, and high-carb processed foods.  4. Increasing Protein Intake:  Objective: Support muscle maintenance and promote satiety.  Action Steps:  Aim to consume 30 grams of protein with each meal.  Include protein-rich foods such as lean meats, fish, eggs, dairy products, legumes, and plant-based proteins.  Consider protein supplements if dietary intake is insufficient.  5. Exercise Routine:  Objective: Enhance calorie expenditure, improve cardiovascular health, and build muscle strength.  Action Steps:  Engage in moderate-intensity aerobic exercise 4-5 days per week for at least 30 minutes each session (e.g., brisk walking, cycling, swimming).  Incorporate strength training exercises 2 times per week, targeting all major muscle groups (e.g., weight lifting, resistance bands, body-weight exercises).  Include  flexibility and balance exercises as part of the routine.  6. Water Intake:  Objective: Stay hydrated to support metabolic processes and overall health.  Action Steps:  Aim to drink at least 8 glasses (64 ounces) of water per day, or more if engaging in intense physical activity.  Monitor urine color as an indicator of hydration; it should be light yellow.  Replace sugary drinks with water or other low-calorie beverages.  7. Intermittent Fasting:  Objective: Promote weight loss and improve metabolic health by controlling eating windows.  Action Steps:  Choose an intermittent fasting schedule that fits your lifestyle, such as the 16/8 method (16 hours fasting, 8 hours eating window).  During fasting periods, consume only water, black coffee, or tea without additives.  Ensure nutrient-dense meals during eating periods to meet dietary needs.    Tips for Success:  Set realistic and achievable goals.  Be consistent with the plan and make adjustments as needed.  Seek support from healthcare providers, nutritionists, or fitness trainers.  Stay motivated by tracking progress and celebrating small victories.  By following these guidelines, patients can work towards their weight loss goals in a structured and effective manner.

## 2024-11-26 NOTE — PROGRESS NOTES
"Patient Name: Elisabeth Martinez  : 1995   MRN: [id number]     Chief Complaint:    Chief Complaint   Patient presents with    Diabetes     FOLLOW UP       History of Present Illness: Elisabeth Martinez is a 29 y.o. female.  History of Present Illness  The patient presents for evaluation of multiple medical concerns.    She is currently on Ozempic 0.5 mg, which initially caused diarrhea but now seems to be well-tolerated. She has discontinued metformin. Despite being on Ozempic since 2024, she has not experienced any weight loss. Her fasting blood sugar levels have improved, ranging from 99 to 115, compared to previous readings of around 130.    She is not currently taking any cholesterol medication.    Supplemental Information  She went back to her neurologist for her migraines 2 weeks ago. He took her off Topamax and started her on propranolol.         Subjective     Review of System: Review of Systems     Medications:       Allergies:   No Known Allergies    Objective     Physical Exam:   Vital Signs:   Vitals:    24 1557   BP: 124/96   BP Location: Right arm   Patient Position: Sitting   Cuff Size: Adult   Pulse: 76   Resp: 16   Temp: 96.9 °F (36.1 °C)   TempSrc: Infrared   Weight: 95.3 kg (210 lb)   Height: 160 cm (63\")   PainSc: 0-No pain         Physical Exam  Constitutional:       General: She is not in acute distress.     Appearance: She is not ill-appearing.   HENT:      Head: Normocephalic.   Cardiovascular:      Rate and Rhythm: Normal rate and regular rhythm.      Heart sounds: Normal heart sounds. No murmur heard.  Pulmonary:      Breath sounds: Normal breath sounds.   Neurological:      General: No focal deficit present.      Mental Status: She is oriented to person, place, and time.   Psychiatric:         Mood and Affect: Mood normal.   Physical Exam         Results       Assessment / Plan      Assessment/Plan:   Diagnoses and all orders for this visit:    1. Type 2 diabetes " mellitus without complication, without long-term current use of insulin (Primary)  -     POC Glycosylated Hemoglobin (Hb A1C); Future  -     Semaglutide, 1 MG/DOSE, (OZEMPIC) 4 MG/3ML solution pen-injector; Inject 1 mg under the skin into the appropriate area as directed 1 (One) Time Per Week.  Dispense: 3 mL; Refill: 5  -     POC Glycosylated Hemoglobin (Hb A1C)    2. Encounter for lipid screening for cardiovascular disease  -     Lipid panel; Future       Assessment & Plan  1. Diabetes Mellitus.  An A1c test will be conducted today to assess blood sugar control. The dosage of semaglutide will be increased from 0.5 mg to 1 mg to potentially enhance weight loss and better manage blood sugar levels. She reports that her fasting blood sugar levels have improved, now ranging between 99 to 115. She is advised to monitor her blood sugar levels and report any significant changes.     Health Maintenance.  A lipid panel will be ordered for a future date. She is advised to return for the test in the morning when fasting.          Follow Up:   Return in about 4 months (around 3/14/2025) for Annual.  Patient or patient representative verbalized consent for the use of Ambient Listening during the visit with  PATRIC Su for chart documentation. 12/1/2024  13:40 EST    PATRIC Su  [facility]

## 2024-12-19 ENCOUNTER — TELEPHONE (OUTPATIENT)
Dept: NEUROLOGY | Facility: CLINIC | Age: 29
End: 2024-12-19
Payer: COMMERCIAL

## 2024-12-19 NOTE — TELEPHONE ENCOUNTER
Provider: DR JEAN    Caller: Elisabeth Martinez    Relationship to Patient: Self    Pharmacy: [facility] [id number]    Phone Number: [phone]    Reason for Call: STATES [facility] NEEDS PRIOR AUTH. PATIENT IS GOING OUT OF TOWN TOMORROW & NEEDS TO GET SCRIPT ASAP. PLEASE ADVISE WHEN AUTH IS BACK, THANK YOU.

## 2024-12-20 NOTE — TELEPHONE ENCOUNTER
Have since spoken with pt, situation resolved, will document fully on Monday, clinic is closing.     JONAH Jang

## 2024-12-23 NOTE — TELEPHONE ENCOUNTER
"Relay     \"Spoke with pt, pharmacy, and then LVM with patient    Called to clarify the issue, it is a refill too soon (until the next day) situation. Med is covered without a PA but sounds like mention of the PA came from the fact her insurance may cover more than a certain amount of tablets (I believe it was 18 tablets?) per month with a PA. We will avoid this route as ideally she will not need more than the prescribed #12 tablets in a month    LVM with Elisabeth to let her know,  rx should be able to be filled tomorrow. Sounds like it will be a partial quantity of #6 tablets. If she is not able to  before leaving [facility], she can get from a [facility] in the area since they share records. She can also get from any other pharmacy near where she is going for vacation, the new pharmacy will just need to call [facility] in [place] and get her rx transferred to them so they can fill. \"                  "

## 2024-12-27 DIAGNOSIS — F41.9 ANXIETY: ICD-10-CM

## 2024-12-27 RX ORDER — HYDROXYZINE HYDROCHLORIDE 10 MG/1
TABLET, FILM COATED ORAL
Qty: 90 TABLET | Refills: 0 | Status: SHIPPED | OUTPATIENT
Start: 2024-12-27

## 2025-01-15 ENCOUNTER — OFFICE VISIT (OUTPATIENT)
Dept: INTERNAL MEDICINE | Facility: CLINIC | Age: 30
End: 2025-01-15
Payer: COMMERCIAL

## 2025-01-15 VITALS
HEIGHT: 63 IN | HEART RATE: 104 BPM | WEIGHT: 209.2 LBS | DIASTOLIC BLOOD PRESSURE: 86 MMHG | TEMPERATURE: 97.3 F | SYSTOLIC BLOOD PRESSURE: 118 MMHG | RESPIRATION RATE: 16 BRPM | BODY MASS INDEX: 37.07 KG/M2

## 2025-01-15 DIAGNOSIS — F32.9 TREATMENT-RESISTANT DEPRESSION: ICD-10-CM

## 2025-01-15 DIAGNOSIS — E11.9 TYPE 2 DIABETES MELLITUS WITHOUT COMPLICATION, WITHOUT LONG-TERM CURRENT USE OF INSULIN: Primary | ICD-10-CM

## 2025-01-15 DIAGNOSIS — E28.2 PCOS (POLYCYSTIC OVARIAN SYNDROME): ICD-10-CM

## 2025-01-15 LAB
ALBUMIN SERPL-MCNC: 4.1 G/DL (ref 3.5–5.2)
ALBUMIN/GLOB SERPL: 1.1 G/DL
ALP SERPL-CCNC: 108 U/L (ref 39–117)
ALT SERPL W P-5'-P-CCNC: 22 U/L (ref 1–33)
ANION GAP SERPL CALCULATED.3IONS-SCNC: 12.1 MMOL/L (ref 5–15)
AST SERPL-CCNC: 19 U/L (ref 1–32)
BASOPHILS # BLD AUTO: 0.07 10*3/MM3 (ref 0–0.2)
BASOPHILS NFR BLD AUTO: 0.7 % (ref 0–1.5)
BILIRUB SERPL-MCNC: 0.2 MG/DL (ref 0–1.2)
BUN SERPL-MCNC: 12 MG/DL (ref 6–20)
BUN/CREAT SERPL: 14.3 (ref 7–25)
CALCIUM SPEC-SCNC: 9.7 MG/DL (ref 8.6–10.5)
CHLORIDE SERPL-SCNC: 106 MMOL/L (ref 98–107)
CO2 SERPL-SCNC: 23.9 MMOL/L (ref 22–29)
CREAT SERPL-MCNC: 0.84 MG/DL (ref 0.57–1)
DEPRECATED RDW RBC AUTO: 36 FL (ref 37–54)
EGFRCR SERPLBLD CKD-EPI 2021: 96.6 ML/MIN/1.73
EOSINOPHIL # BLD AUTO: 0.13 10*3/MM3 (ref 0–0.4)
EOSINOPHIL NFR BLD AUTO: 1.3 % (ref 0.3–6.2)
ERYTHROCYTE [DISTWIDTH] IN BLOOD BY AUTOMATED COUNT: 13 % (ref 12.3–15.4)
GLOBULIN UR ELPH-MCNC: 3.6 GM/DL
GLUCOSE SERPL-MCNC: 91 MG/DL (ref 65–99)
HCT VFR BLD AUTO: 40.2 % (ref 34–46.6)
HGB BLD-MCNC: 12.8 G/DL (ref 12–15.9)
IMM GRANULOCYTES # BLD AUTO: 0.03 10*3/MM3 (ref 0–0.05)
IMM GRANULOCYTES NFR BLD AUTO: 0.3 % (ref 0–0.5)
LYMPHOCYTES # BLD AUTO: 2.53 10*3/MM3 (ref 0.7–3.1)
LYMPHOCYTES NFR BLD AUTO: 25.8 % (ref 19.6–45.3)
MCH RBC QN AUTO: 24.5 PG (ref 26.6–33)
MCHC RBC AUTO-ENTMCNC: 31.8 G/DL (ref 31.5–35.7)
MCV RBC AUTO: 76.9 FL (ref 79–97)
MONOCYTES # BLD AUTO: 0.5 10*3/MM3 (ref 0.1–0.9)
MONOCYTES NFR BLD AUTO: 5.1 % (ref 5–12)
NEUTROPHILS NFR BLD AUTO: 6.56 10*3/MM3 (ref 1.7–7)
NEUTROPHILS NFR BLD AUTO: 66.8 % (ref 42.7–76)
NRBC BLD AUTO-RTO: 0 /100 WBC (ref 0–0.2)
PLATELET # BLD AUTO: 321 10*3/MM3 (ref 140–450)
PMV BLD AUTO: 11.5 FL (ref 6–12)
POTASSIUM SERPL-SCNC: 3.9 MMOL/L (ref 3.5–5.2)
PROT SERPL-MCNC: 7.7 G/DL (ref 6–8.5)
RBC # BLD AUTO: 5.23 10*6/MM3 (ref 3.77–5.28)
SODIUM SERPL-SCNC: 142 MMOL/L (ref 136–145)
TSH SERPL DL<=0.05 MIU/L-ACNC: 1.64 UIU/ML (ref 0.27–4.2)
WBC NRBC COR # BLD AUTO: 9.82 10*3/MM3 (ref 3.4–10.8)

## 2025-01-15 PROCEDURE — 80050 GENERAL HEALTH PANEL: CPT | Performed by: NURSE PRACTITIONER

## 2025-01-15 RX ORDER — RIFAXIMIN 550 MG/1
TABLET ORAL
COMMUNITY
Start: 2024-12-19

## 2025-01-15 RX ORDER — ALBUTEROL SULFATE 90 UG/1
INHALANT RESPIRATORY (INHALATION)
COMMUNITY
Start: 2024-12-20

## 2025-01-15 NOTE — PROGRESS NOTES
Patient Name: Elisabeth Martinez  : 1995   MRN: [id number]     Chief Complaint:    Chief Complaint   Patient presents with    Anxiety    Depression       History of Present Illness: Elisabeth Martinez is a 29 y.o. female.  History of Present Illness  The patient is a 29-year-old female who presents for evaluation of anxiety, depression, diabetes, and PCOS.    She is currently on Viibryd and hydroxyzine for her mental health conditions. She reports an increase in anxiety levels, which she attributes to her work as a  and familial issues. She has always had a short temper, which she believes is exacerbated by her family situation. She also reports mood instability, which she believes is circumstantial. She has previously been on Abilify 5 mg, which was effective but caused significant jitteriness, leading to sleep disturbances and urinary retention requiring catheterization. She has undergone [employer] testing and recalls that Vraylar was categorized as green. She has been under the care of [facility] but has been informed that the next available appointment is not until 2024. She has previously been on BuSpar in combination with an antidepressant.    She is uncertain about the need for dosage adjustment as she has been experiencing a deterioration in her depressive symptoms, including suicidal ideation, although she reports no intent/plan or previous attempts at self-harm. She does not report any significant loss of interest in activities, suggesting that her symptoms are more anxiety-driven.    She has not experienced any weight loss despite being on semaglutide 1 mg since 2023. She has reduced her fast food intake to a few times per week. She has not experienced any side effects from the medication. Her blood glucose levels have decreased, with fasting levels around 100, compared to previous levels between 120 and 130. She is not currently on any thyroid medication. She  takes Xifaxan as needed and has only completed 1 or 2 courses.    She has a history of irregular menstrual cycles and was diagnosed with PCOS in 2016. She is not on birth control pills but takes Provera bimonthly to induce menstruation. She experienced a prolonged bleeding episode lasting 121 days over the summer, characterized by large blood clots. An endometrial biopsy was performed in October 2023 to rule out hyperplasia. She has an upcoming appointment with her new OB-GYN in April 2024.    SOCIAL HISTORY  She works as a .    MEDICATIONS  Current: Viibryd, hydroxyzine, semaglutide, Xifaxan (as needed), Provera  Past: Abilify, BuSpar, Prozac     PHQ-9 Depression Screening  Little interest or pleasure in doing things? Several days   Feeling down, depressed, or hopeless? Several days   PHQ-2 Total Score 2   Trouble falling or staying asleep, or sleeping too much? Almost all   Feeling tired or having little energy? Almost all   Poor appetite or overeating? Several days   Feeling bad about yourself - or that you are a failure or have let yourself or your family down? Not at all   Trouble concentrating on things, such as reading the newspaper or watching television? Not at all   Moving or speaking so slowly that other people could have noticed? Or the opposite - being so fidgety or restless that you have been moving around a lot more than usual? Not at all   Thoughts that you would be better off dead, or of hurting yourself in some way? Several days   PHQ-9 Total Score 10   If you checked off any problems, how difficult have these problems made it for you to do your work, take care of things at home, or get along with other people? Somewhat difficult         Subjective     Review of System: Review of Systems     Medications:     Current Outpatient Medications:     albuterol sulfate  (90 Base) MCG/ACT inhaler, , Disp: , Rfl:     Cariprazine HCl (VRAYLAR) 1.5 MG capsule capsule, Take 1 capsule by  mouth Every Other Day., Disp: 14 capsule, Rfl: 0    cetirizine (zyrTEC) 10 MG tablet, Take 1 tablet by mouth Daily., Disp: 90 tablet, Rfl: 1    fluticasone (FLONASE) 50 MCG/ACT nasal spray, 2 sprays into the nostril(s) as directed by provider Daily., Disp: 33.3 mL, Rfl: 5    Fremanezumab-vfrm (Ajovy) 225 MG/1.5ML solution auto-injector, Inject 225 mg under the skin into the appropriate area as directed Every 30 (Thirty) Days., Disp: 1.5 mL, Rfl: 11    glucose blood test strip, Check once daily for diabetes. E 11.9, Disp: 500 each, Rfl: 2    glucose monitor monitoring kit, Check once daily for diabetes. E 11.9, Disp: 1 each, Rfl: 0    hydrOXYzine (ATARAX) 10 MG tablet, TAKE ONE TABLET BY MOUTH EVERY 8 HOURS AS NEEDED FOR ITCHING, Disp: 90 tablet, Rfl: 0    hyoscyamine (LEVSIN) 0.125 MG SL tablet, Take 1 tablet by mouth 4 (Four) Times a Day As Needed for Cramping., Disp: 120 tablet, Rfl: 5    Lancets misc, Check once daily for diabetes. E 11.9, Disp: 200 each, Rfl: 5    medroxyPROGESTERone (Provera) 10 MG tablet, Take 1 tablet by mouth Daily., Disp: 10 tablet, Rfl: 6    montelukast (SINGULAIR) 10 MG tablet, TAKE ONE TABLET BY MOUTH ONCE NIGHTLY, Disp: 90 tablet, Rfl: 1    multivitamin with minerals tablet tablet, Take 1 tablet by mouth Daily., Disp: , Rfl:     propranolol LA (Inderal LA) 60 MG 24 hr capsule, Take 1 capsule by mouth Daily for 30 days., Disp: 30 capsule, Rfl: 4    rizatriptan (MAXALT) 10 MG tablet, Take 1 tablet by mouth 1 (One) Time As Needed for Migraine for up to 1 dose. May repeat in 2 hours if needed, Disp: 12 tablet, Rfl: 3    vilazodone (VIIBRYD) 40 MG tablet tablet, TAKE 1 TABLET BY MOUTH DAILY, Disp: 90 tablet, Rfl: 0    vitamin C (ASCORBIC ACID) 250 MG tablet, Take 1 tablet by mouth Daily., Disp: , Rfl:     Vitamin D, Cholecalciferol, 50 MCG (2000 UT) capsule, Take 2 capsules by mouth., Disp: , Rfl:     Xifaxan 550 MG tablet, , Disp: , Rfl:     Tirzepatide 2.5 MG/0.5ML solution auto-injector,  "Inject 2.5 mg under the skin into the appropriate area as directed 1 (One) Time Per Week., Disp: 2 mL, Rfl: 0    Allergies:   No Known Allergies    Objective     Physical Exam:   Vital Signs:   Vitals:    01/15/25 1027   BP: 118/86   BP Location: Right arm   Patient Position: Sitting   Cuff Size: Adult   Pulse: 104   Resp: 16   Temp: 97.3 °F (36.3 °C)   TempSrc: Infrared   Weight: 94.9 kg (209 lb 3.2 oz)   Height: 160 cm (63\")   PainSc: 0-No pain     Body mass index is 37.06 kg/m².  Class 2 Severe Obesity (BMI >=35 and <=39.9). Obesity-related health conditions include the following: none. Obesity is unchanged. BMI is is above average; BMI management plan is completed. We discussed portion control, increasing exercise, and pharmacologic options including Mounjaro .       Physical Exam  Physical Exam  Lungs are clear and equal bilaterally.  Heart rate is regular.  Abdomen is soft and nontender.  Mood is normal.       Results  Laboratory Studies  A1c was 5.6 in November 2023.     Assessment / Plan      Assessment/Plan:   Diagnoses and all orders for this visit:    1. Type 2 diabetes mellitus without complication, without long-term current use of insulin (Primary)  -     Comprehensive metabolic panel; Future  -     CBC w AUTO Differential; Future  -     TSH Rfx On Abnormal To Free T4; Future  -     Tirzepatide 2.5 MG/0.5ML solution auto-injector; Inject 2.5 mg under the skin into the appropriate area as directed 1 (One) Time Per Week.  Dispense: 2 mL; Refill: 0  -     Comprehensive metabolic panel  -     CBC w AUTO Differential  -     TSH Rfx On Abnormal To Free T4    2. PCOS (polycystic ovarian syndrome)    3. Treatment-resistant depression  -     Cariprazine HCl (VRAYLAR) 1.5 MG capsule capsule; Take 1 capsule by mouth Every Other Day.  Dispense: 14 capsule; Refill: 0       Assessment & Plan  1. Resistant Depression/Anxiety.  Her symptoms suggest a predominance of anxiety over depression. She has previously been on " BuSpar in combination with an antidepressant. A trial of Vraylar 1. 5 mg every other day for  1 month; then will to discuss and increase to daily if needed/tolerated.Her response will be monitored over the ensuing weeks. She has been advised to seek immediate medical attention if her depression intensifies or if she experiences suicidal ideation. The potential risk of serotonin syndrome with the combination of Viibryd and Vraylar has been discussed, and she has been instructed to monitor for symptoms such as hyperthermia, muscle rigidity, and altered mental status.    2. Diabetes Mellitus.  Her A1c level was recorded as 5.6 in November 2023. She is currently on semaglutide 1 mg and reports no significant side effects.  A trial of Mounjaro and dietary modifications will be prescribed. A lipid panel will be ordered today.    4. Polycystic Ovary Syndrome (PCOS).  She has a history of irregular periods and was diagnosed with PCOS in 2016. She is currently taking Provera bimonthly to induce periods and prevent hyperplasia. She has been advised to follow up with her OB-GYN in April 2024 for further management.    PROCEDURE  An endometrial biopsy was performed in October 2023 to rule out hyperplasia.       Explained and discussed patient's condition and plan of care including labs and radiology that may be ordered.  Discussed when to follow-up.  Discussed possible red flags and how to follow-up with those.  Viewed patient's medications and discussed common side effects and symptoms to report. Patient to continue current medications as advised.  Be compliant with medications. Patient to call clinic if they have any worsening, no improvement, does not tolerate medication, or any future concerns about treatment.  Questions and concerns addressed at this appointment.  Patient to report to ER for emergencies.  Patient verbalized an understanding and agreement with plan of care.      Follow Up:   Return in about 4 weeks (around  2/12/2025) for Recheck; telehealth .  Patient or patient representative verbalized consent for the use of Ambient Listening during the visit with  PATRIC Su for chart documentation. 1/19/2025  09:47 EST    PATRIC Su  [facility] [place], [place] [facility]

## 2025-01-15 NOTE — PATIENT INSTRUCTIONS
Suicidal Feelings: How to Help Yourself  Suicide is when you end your own life. Suicidal ideation includes expressing thoughts about, or a preoccupation with, ending your own life. There are many things you can do to help yourself feel better when struggling with these feelings. Many services and people are available to support you and others who struggle with similar feelings.  If you ever feel like you may hurt yourself or others, or have thoughts about taking your own life, get help right away. To get help:  Go to your nearest emergency room.  Call 911.  Call the Pending sale to [facility] and human services helpline (211 in the [place]  Call or text a suicide hotline to speak with a trained counselor. The following suicide hotlines are available in the [place]:  The Suicide & Crisis Lifeline (free and confidential):  Call 1-177.692.2995 or 988.  Text 569168.  8-318-KGWBYVO (1-992.660.7077).  If you're a [place]:  Call 988 and press 1.  Text the Veterans Crisis Line at 473936.  1-587.434.5482. This is a hotline for [place] speakers.  1-680.784.7753. This is a hotline for TTY users.  5-544-6-U-MAK (1-577.947.8223). This is a hotline for lesbian, velazquez, bisexual, transgender, or questioning youth.  For a list of hotlines in [place], visit suicide.org/hotlines/international/rjjqjs-nhbghbw-blhmjpdh.html  Contact a crisis center or a local suicide prevention center. To find a crisis center or suicide prevention center:  Call your local hospital, clinic, community service organization, mental health center, social service provider, or health department. Ask for help with connecting to a crisis center.  For a list of crisis centers in the [place], visit: suicidepreventionlifeline.org  For a list of crisis centers in [place], visit: suicideprevention.ca  How to help yourself feel better    Promise yourself that you will not do anything bad or extreme when you have suicidal feelings. Remember the times you have felt  hopeful.  Many people have gotten through suicidal thoughts and feelings, and you can too.  If you have had these feelings before, remind yourself that you can get through them again.  Let family, friends, teachers, or counselors know how you are feeling. Do not separate yourself from those who care about you and want to help you.  Talk with someone every day, even if you do not feel like talking to anyone or being with other people.  Face-to-face conversation is best to help them understand your feelings.  Contact a mental health care provider and work with this person regularly.  Make a safety plan that you can follow during a crisis.  Include phone numbers of suicide prevention hotlines, mental health professionals, and trusted friends and family members you can call during an emergency.  Save these numbers on your phone.  If you are thinking of taking a lot of medicine, give your medicine to someone who can give it to you as prescribed.  If you are on antidepressants and are concerned you will overdose, tell your health care provider so that he or she can give you safer medicines.  Try to stick to your routines and follow a schedule every day. Make self-care a priority.  Make a list of realistic goals, and cross them off when you achieve them. Accomplishments can give you a sense of worth.  Wait until you are feeling better before doing things that you find difficult or unpleasant.  Do things that you have always enjoyed to take your mind off your feelings.  Try reading a book, or listening to or playing music.  Spending time outside, in nature, may help you feel better.  Follow these instructions at home:    Visit your primary health care provider every year for a physical and a mental health checkup.  Take over-the-counter and prescription medicines only as told by your health care provider.  Ask your health care provider about the possible side effects of any medicines you are taking.  Ask your health care  provider about whether suicidal ideation is a possible side effect of any of your medicines.  Learn about suicidal ideation and what increases the risk for the development of suicidal thoughts.  Eat a well-balanced diet, and eat regular meals.  Get plenty of rest.  Exercise if you are able. Just 30 minutes of exercise each day can help you feel better.  Keep your living space well lit.  Do not use alcohol or drugs. Remove these substances from your home.  General recommendations  Remove weapons, poisons, knives, and other deadly items from your home.  Work with a mental health care provider as needed.  When you are feeling well, write yourself a letter with tips and support that you can read when you are not feeling well.  Remember that life's difficulties can be sorted out with help. Conditions can be treated, and you can learn behaviors and ways of thinking that will help you.  Work with your health care provider or counselor to learn ways of coping with your thoughts and feelings.  Where to find more information  National Suicide Prevention Lifeline: www.suicidepreventionlifeline.org  Hopeline: www.hopeline.[place]  American Foundation for Suicide Prevention: www.afsp.org  The [place] Project (for lesbian, velazquez, bisexual, transgender, or questioning youth): www.thetrevorproject.org  National [place] of Mental Health: www.nimh.nih.gov/health/topics/suicide-prevention  Suicide Prevention Resources: afsp.org/suicide-prevention-resources  Contact a health care provider if:  You feel as though you are a burden to others.  You feel agitated, angry, vengeful, or have extreme mood swings.  You have withdrawn from family and friends.  You are frequently using drugs or alcohol.  Get help right away if:  You are talking about suicide or wishing to die.  You start making plans for how to commit suicide.  You feel that you have no reason to live.  You start making plans for putting your affairs in order, saying goodbye, or giving  your possessions away.  You feel guilt, shame, or unbearable pain, and it seems like there is no way out.  You are engaging in risky behaviors that could lead to death.  If you have any of these thoughts or symptoms, get help right away:  Go to your nearest emergency department or crisis center.  Call emergency services (911 in the [place]  Call or text a suicide crisis helpline.  Summary  Suicide is when you take your own life. Suicidal feelings are thoughts about ending your own life.  Promise yourself that you will not do anything bad or extreme when you have suicidal feelings.  Let family, friends, teachers, or counselors know how you are feeling.  Get help right away if you start making plans for how to commit suicide.  This information is not intended to replace advice given to you by your health care provider. Make sure you discuss any questions you have with your health care provider.  Document Revised: 10/08/2024 Document Reviewed: 04/28/2022  Elsevier Patient Education © 2024 [employer] Inc.

## 2025-01-22 ENCOUNTER — PATIENT MESSAGE (OUTPATIENT)
Dept: INTERNAL MEDICINE | Facility: CLINIC | Age: 30
End: 2025-01-22
Payer: COMMERCIAL

## 2025-01-22 DIAGNOSIS — F32.1 MODERATE MAJOR DEPRESSION: ICD-10-CM

## 2025-01-22 RX ORDER — VILAZODONE HYDROCHLORIDE 40 MG/1
40 TABLET ORAL DAILY
Qty: 90 TABLET | Refills: 0 | Status: SHIPPED | OUTPATIENT
Start: 2025-01-22

## 2025-01-24 DIAGNOSIS — E11.9 TYPE 2 DIABETES MELLITUS WITHOUT COMPLICATION, WITHOUT LONG-TERM CURRENT USE OF INSULIN: ICD-10-CM

## 2025-01-24 DIAGNOSIS — J45.20 MILD INTERMITTENT ASTHMA WITHOUT COMPLICATION: ICD-10-CM

## 2025-01-24 DIAGNOSIS — F32.1 MODERATE MAJOR DEPRESSION: ICD-10-CM

## 2025-01-24 DIAGNOSIS — J30.2 SEASONAL ALLERGIES: ICD-10-CM

## 2025-01-24 RX ORDER — MONTELUKAST SODIUM 10 MG/1
10 TABLET ORAL NIGHTLY
Qty: 90 TABLET | Refills: 0 | Status: SHIPPED | OUTPATIENT
Start: 2025-01-24

## 2025-01-24 RX ORDER — LANCETS 30 GAUGE
EACH MISCELLANEOUS
Qty: 200 EACH | Refills: 2 | Status: SHIPPED | OUTPATIENT
Start: 2025-01-24

## 2025-01-24 RX ORDER — VILAZODONE HYDROCHLORIDE 40 MG/1
40 TABLET ORAL DAILY
Qty: 90 TABLET | Refills: 0 | OUTPATIENT
Start: 2025-01-24

## 2025-01-28 DIAGNOSIS — F32.9 TREATMENT-RESISTANT DEPRESSION: Primary | ICD-10-CM

## 2025-02-04 NOTE — TELEPHONE ENCOUNTER
Rx Refill Note  Requested Prescriptions     Pending Prescriptions Disp Refills    rizatriptan (MAXALT) 10 MG tablet [Pharmacy Med Name: RIZATRIPTAN 10 MG TABLET] 18 tablet      Sig: TAKE 1 TABLET BY MOUTH AT ONSET OF MIGRAINE; MAY REPEAT 1 TABLET IN 2 HOURS IF NEEDED.      Last filled: 11/1/24 #12 with 3 refills  Last office visit with prescribing clinician: 2/11/25    Next office visit with prescribing clinician: 8/12/25    Suhail Bruce MA  02/04/25, 08:54 EST

## 2025-02-11 ENCOUNTER — OFFICE VISIT (OUTPATIENT)
Dept: NEUROLOGY | Facility: CLINIC | Age: 30
End: 2025-02-11
Payer: COMMERCIAL

## 2025-02-11 VITALS — OXYGEN SATURATION: 97 % | SYSTOLIC BLOOD PRESSURE: 108 MMHG | HEART RATE: 81 BPM | DIASTOLIC BLOOD PRESSURE: 70 MMHG

## 2025-02-11 DIAGNOSIS — G43.019 INTRACTABLE MIGRAINE WITHOUT AURA AND WITHOUT STATUS MIGRAINOSUS: Primary | ICD-10-CM

## 2025-02-11 PROCEDURE — 99214 OFFICE O/P EST MOD 30 MIN: CPT | Performed by: PSYCHIATRY & NEUROLOGY

## 2025-02-11 RX ORDER — PROPRANOLOL HYDROCHLORIDE 60 MG/1
60 CAPSULE, EXTENDED RELEASE ORAL DAILY
Qty: 90 CAPSULE | Refills: 1 | Status: SHIPPED | OUTPATIENT
Start: 2025-02-11 | End: 2025-03-13

## 2025-02-12 RX ORDER — RIZATRIPTAN BENZOATE 10 MG/1
TABLET ORAL
Qty: 12 TABLET | Refills: 3 | Status: SHIPPED | OUTPATIENT
Start: 2025-02-12

## 2025-02-21 ENCOUNTER — TELEPHONE (OUTPATIENT)
Dept: INTERNAL MEDICINE | Facility: CLINIC | Age: 30
End: 2025-02-21

## 2025-02-21 ENCOUNTER — TELEMEDICINE (OUTPATIENT)
Dept: INTERNAL MEDICINE | Facility: CLINIC | Age: 30
End: 2025-02-21
Payer: COMMERCIAL

## 2025-02-21 DIAGNOSIS — E11.9 TYPE 2 DIABETES MELLITUS WITHOUT COMPLICATION, WITHOUT LONG-TERM CURRENT USE OF INSULIN: Primary | ICD-10-CM

## 2025-02-21 RX ORDER — GLUCAGON INJECTION, SOLUTION 1 MG/.2ML
1 INJECTION, SOLUTION SUBCUTANEOUS AS NEEDED
Qty: 0.4 ML | Refills: 2 | Status: SHIPPED | OUTPATIENT
Start: 2025-02-21

## 2025-02-21 NOTE — TELEPHONE ENCOUNTER
Caller: Elisabeth Martinez    Relationship: Self    Best call back number: [phone]    What is the best time to reach you: ANYTIME    Who are you requesting to speak with (clinical staff, provider,  specific staff member): KAYLEIGH    Do you know the name of the person who called: KAYLEIGH    What was the call regarding: PATIENT IS RETURNING YOUR CALL. UNSURE WHAT IT WAS REGARDING     Is it okay if the provider responds through [employer]: YES

## 2025-02-21 NOTE — PROGRESS NOTES
Patient Name: Elisabeth Martinez  : 1995   MRN: [id number]     Chief Complaint:  No chief complaint on file.      History of Present Illness: Elisabeth Martinez is a 29 y.o. female.  History of Present Illness  The patient is a 29-year-old female who presents via virtual visit for evaluation of diabetes.    Diabetes Management  - She has transitioned to Mounjaro, with her initial dose of 5 mg administered the previous night.  - She reports no adverse effects such as diarrhea, which she had experienced with Ozempic.  - Her blood glucose levels have been generally well-controlled, although she has encountered occasional hypoglycemic episodes, characterized by symptoms of shakiness and dizziness.  - These episodes are typically resolved with the consumption of a snack.  - She recalls a recent episode of hypoglycemia, approximately one week ago, during which her blood glucose level was recorded at 71, likely due to a prolonged period without food intake.  - She is not currently on any other antidiabetic medications.    Supplemental Information  - Her physical activity has been limited recently due to inclement weather, but she maintains a daily routine of walking her dog for approximately 20 minutes.    MEDICATIONS  Current: Mounjaro  Past: Ozempic     You have chosen to receive care through a telehealth visit. Patient consents to video/audio connection for your medical care today.   This visit completed via [employer].    Patient is home and provider is at [facility] [place] [id number].          Subjective     Review of System: Review of Systems     Medications:     Current Outpatient Medications:     albuterol sulfate  (90 Base) MCG/ACT inhaler, , Disp: , Rfl:     Cariprazine HCl (VRAYLAR) 1.5 MG capsule capsule, Take 1 capsule by mouth Every Other Day., Disp: 14 capsule, Rfl: 0    cetirizine (zyrTEC) 10 MG tablet, Take 1 tablet by mouth  Daily., Disp: 90 tablet, Rfl: 1    fluticasone (FLONASE) 50 MCG/ACT nasal spray, 2 sprays into the nostril(s) as directed by provider Daily., Disp: 33.3 mL, Rfl: 5    Fremanezumab-vfrm (Ajovy) 225 MG/1.5ML solution auto-injector, Inject 225 mg under the skin into the appropriate area as directed Every 30 (Thirty) Days., Disp: 1.5 mL, Rfl: 11    Glucagon (Gvoke HypoPen 2-Pack) 1 MG/0.2ML solution auto-injector, Inject 1 mg under the skin into the appropriate area as directed As Needed (hypoglycemia)., Disp: 0.4 mL, Rfl: 2    glucose blood test strip, Check once daily for diabetes. E 11.9, Disp: 500 each, Rfl: 2    glucose monitor monitoring kit, Check once daily for diabetes. E 11.9, Disp: 1 each, Rfl: 0    hydrOXYzine (ATARAX) 10 MG tablet, TAKE ONE TABLET BY MOUTH EVERY 8 HOURS AS NEEDED FOR ITCHING, Disp: 90 tablet, Rfl: 0    hyoscyamine (LEVSIN) 0.125 MG SL tablet, Take 1 tablet by mouth 4 (Four) Times a Day As Needed for Cramping., Disp: 120 tablet, Rfl: 5    Lancets misc, Check once daily for diabetes. E 11.9, Disp: 200 each, Rfl: 2    montelukast (SINGULAIR) 10 MG tablet, Take 1 tablet by mouth Every Night., Disp: 90 tablet, Rfl: 0    multivitamin with minerals tablet tablet, Take 1 tablet by mouth Daily., Disp: , Rfl:     propranolol LA (Inderal LA) 60 MG 24 hr capsule, Take 1 capsule by mouth Daily for 30 days., Disp: 90 capsule, Rfl: 1    rizatriptan (MAXALT) 10 MG tablet, TAKE 1 TABLET BY MOUTH AT ONSET OF MIGRAINE; MAY REPEAT 1 TABLET IN 2 HOURS IF NEEDED., Disp: 12 tablet, Rfl: 3    Tirzepatide 5 MG/0.5ML solution auto-injector, Inject 5 mg under the skin into the appropriate area as directed 1 (One) Time Per Week., Disp: 2 mL, Rfl: 2    vilazodone (VIIBRYD) 40 MG tablet tablet, TAKE 1 TABLET BY MOUTH DAILY, Disp: 90 tablet, Rfl: 0    vitamin C (ASCORBIC ACID) 250 MG tablet, Take 1 tablet by mouth Daily., Disp: , Rfl:     Vitamin D, Cholecalciferol, 50 MCG (2000 UT) capsule, Take 2 capsules by mouth.,  Disp: , Rfl:     Xifaxan 550 MG tablet, Take 1 tablet by mouth 2 (Two) Times a Day As Needed., Disp: , Rfl:     Allergies:   No Known Allergies    Objective     Physical Exam:   Vital Signs: There were no vitals filed for this visit.  There is no height or weight on file to calculate BMI.        Physical Exam  Physical Exam  The patient appears well.  Respiratory effort is normal.  Mood is normal.       Results  Laboratory Studies  Blood sugar was 71.     Assessment / Plan      Assessment/Plan:   Diagnoses and all orders for this visit:    1. Type 2 diabetes mellitus without complication, without long-term current use of insulin (Primary)  -     Glucagon (Gvoke HypoPen 2-Pack) 1 MG/0.2ML solution auto-injector; Inject 1 mg under the skin into the appropriate area as directed As Needed (hypoglycemia).  Dispense: 0.4 mL; Refill: 2    2. Body mass index (BMI) of 37.0 to 37.9 in adult       Assessment & Plan  1. Diabetes Mellitus  - A1c levels well-regulated with current medication regimen  - Maintain a consistent eating schedule  - Incorporate strength training into exercise routine  - Report any exacerbation of symptoms; if any further severe symptoms will deescalate dose   - Continue with the lowest effective dose of Mounjaro 5 mg if well-tolerated  - Prescription for gvoke sent in; keep glucose tablets or candy for emergency use in case of severe hypoglycemia  - If blood glucose levels fall below 70, consume food  - If levels drop to the 50s and patient becomes unresponsive or unable to eat, administer glucose injection or seek assistance  - Post-injection, consume food with protein to sustain blood glucose levels  - Medication refills sent in  - Inform us if experiencing intolerance to medication or further hypoglycemic episodes to consider dosage reduction    Follow-up  - Patient to follow up in 3 months       Explained and discussed patient's condition and plan of care including labs and radiology that may be  ordered.  Discussed when to follow-up.  Discussed possible red flags and how to follow-up with those.  Viewed patient's medications and discussed common side effects and symptoms to report. Patient to continue current medications as advised.  Be compliant with medications. Patient to call clinic if they have any worsening, no improvement, does not tolerate medication, or any future concerns about treatment.  Questions and concerns addressed at this appointment.  Patient to report to ER for emergencies.  Patient verbalized an understanding and agreement with plan of care.      Follow Up:   Return in about 3 months (around 5/21/2025) for Recheck.  Patient or patient representative verbalized consent for the use of Ambient Listening during the visit with  PATRIC Su for chart documentation. 2/21/2025  12:42 EST    PATRIC Su  [facility]

## 2025-03-03 ENCOUNTER — OFFICE VISIT (OUTPATIENT)
Age: 30
End: 2025-03-03
Payer: COMMERCIAL

## 2025-03-03 VITALS
OXYGEN SATURATION: 98 % | SYSTOLIC BLOOD PRESSURE: 128 MMHG | HEART RATE: 93 BPM | DIASTOLIC BLOOD PRESSURE: 82 MMHG | HEIGHT: 63 IN | BODY MASS INDEX: 37.25 KG/M2 | WEIGHT: 210.2 LBS

## 2025-03-03 DIAGNOSIS — F43.12 CHRONIC POST-TRAUMATIC STRESS DISORDER (PTSD): ICD-10-CM

## 2025-03-03 DIAGNOSIS — Z13.6 SCREENING FOR CARDIOVASCULAR CONDITION: ICD-10-CM

## 2025-03-03 DIAGNOSIS — F41.1 GAD (GENERALIZED ANXIETY DISORDER): ICD-10-CM

## 2025-03-03 DIAGNOSIS — R06.83 LOUD SNORING: ICD-10-CM

## 2025-03-03 DIAGNOSIS — F42.2 MIXED OBSESSIONAL THOUGHTS AND ACTS: ICD-10-CM

## 2025-03-03 DIAGNOSIS — R53.82 CHRONIC FATIGUE: ICD-10-CM

## 2025-03-03 DIAGNOSIS — F33.41 MDD (MAJOR DEPRESSIVE DISORDER), RECURRENT, IN PARTIAL REMISSION: Primary | ICD-10-CM
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
## 2025-03-04 RX ORDER — LURASIDONE HYDROCHLORIDE 20 MG/1
20 TABLET, FILM COATED ORAL DAILY
Qty: 30 TABLET | Refills: 0 | Status: SHIPPED | OUTPATIENT
Start: 2025-03-04 | End: 2025-04-03

## 2025-03-11 NOTE — PROGRESS NOTES
"Subjective   Chief Complaint   Patient presents with    Follow-up     Med check: Provera, pt states that she is still having break-through bleeding.     Elisabeth Martinez is a 29 y.o. year old .  No LMP recorded (lmp unknown).  She presents for follow up bleeding calendar after benign EMBx for AUB. She has PMH significant for PCOS and migraines WITH aura. Last year, she had 120 days of bleeding, and was placed on cyclic Provera 10 days out of every month. She has tried and failed IUD and Nexplanon (possible depression side effects, but is not sure if it was due to life stressors at the time) in the past. Today, she reports she is still having weekly breakthrough bleeding, multiple times a week. She has a tampon in today as she does not know when she will bleed or not. The amount has decreased but last week she still had blood clots, the size of a half dollar. She is interested in alternatives for bleeding control at this time.     She also reports difficulty losing weight and believes she has cut out fast food, but may still be taking in too many calories. As for exercise, she is walking, but has not started resistance training.     The following portions of the patient's history were reviewed and updated as appropriate:current medications, allergies, past medical history, and past surgical history    Social History    Tobacco Use      Smoking status: Never        Passive exposure: Never      Smokeless tobacco: Never         Objective   /80 (BP Location: Left arm, Patient Position: Sitting, Cuff Size: Large Adult)   Ht 160 cm (62.99\")   Wt 96.2 kg (212 lb)   LMP  (LMP Unknown)   Breastfeeding No   BMI 37.56 kg/m²     General:  well developed; well nourished  no acute distress  mentation appropriate  obese - Body mass index is 37.56 kg/m².   Lungs:  breathing is unlabored   Heart:  Not performed.     Lab Review   EMBx report    Imaging   Pelvic ultrasound report        Assessment   Irregular " menses  PCOS  Migraine with aura  Obesity      Plan   Extensively discussed PCOS and long-term effects on overall health, including type 2 diabetes, cardiovascular disease, hypertension, infertility, etc.  Patient voiced understanding.  Discussed need for daily progesterone contraception for better bleeding control, and patient agreeable.  Sample of daily Slynd given today in clinic and 90-day supply sent to [facility] pharmacy.  Due to interaction with Mounjaro, recommend consistent condom use as backup birth control.  Recommend patient take continuously, skipping the placebo pills, for the best bleeding profile.  Discussed may take up to 3-4 cycles to feel full effects of medication.  All questions answered to the best of my ability.  Referral placed to nutrition services for diet counseling.  Also offered exercise counseling, however patient like to hold off at this time, and start with diet counseling first.  The importance of keeping all planned follow-up and taking all medications as prescribed was emphasized.  Follow up 3 months for Slynd check or sooner PRN     New Medications Ordered This Visit   Medications    Drospirenone 4 MG tablet     Sig: Take 1 tablet by mouth Daily.     Dispense:  90 tablet     Refill:  4     WILL BE SKIPPING PLACEBO PILLS          This note was electronically signed.    Niyah Lincoln MD  March 12, 2025

## 2025-03-12 ENCOUNTER — OFFICE VISIT (OUTPATIENT)
Dept: OBSTETRICS AND GYNECOLOGY | Facility: CLINIC | Age: 30
End: 2025-03-12
Payer: COMMERCIAL

## 2025-03-12 VITALS
WEIGHT: 212 LBS | HEIGHT: 63 IN | BODY MASS INDEX: 37.56 KG/M2 | SYSTOLIC BLOOD PRESSURE: 110 MMHG | DIASTOLIC BLOOD PRESSURE: 80 MMHG

## 2025-03-12 DIAGNOSIS — E28.2 PCOS (POLYCYSTIC OVARIAN SYNDROME): ICD-10-CM

## 2025-03-12 DIAGNOSIS — N93.9 ABNORMAL UTERINE BLEEDING (AUB): Primary | ICD-10-CM

## 2025-03-12 DIAGNOSIS — N97.0 ANOVULATION: ICD-10-CM

## 2025-03-12 RX ORDER — DROSPIRENONE 4 MG/1
1 TABLET, FILM COATED ORAL DAILY
Qty: 28 TABLET | Refills: 0 | COMMUNITY
Start: 2025-03-12

## 2025-03-18 ENCOUNTER — OFFICE VISIT (OUTPATIENT)
Age: 30
End: 2025-03-18
Payer: COMMERCIAL

## 2025-03-18 VITALS
HEIGHT: 63 IN | HEART RATE: 123 BPM | WEIGHT: 209.3 LBS | SYSTOLIC BLOOD PRESSURE: 124 MMHG | BODY MASS INDEX: 37.09 KG/M2 | OXYGEN SATURATION: 98 % | DIASTOLIC BLOOD PRESSURE: 82 MMHG

## 2025-03-18 DIAGNOSIS — F42.2 MIXED OBSESSIONAL THOUGHTS AND ACTS: ICD-10-CM

## 2025-03-18 DIAGNOSIS — F41.1 GAD (GENERALIZED ANXIETY DISORDER): ICD-10-CM

## 2025-03-18 DIAGNOSIS — F43.12 CHRONIC POST-TRAUMATIC STRESS DISORDER (PTSD): ICD-10-CM

## 2025-03-18 DIAGNOSIS — F33.41 MDD (MAJOR DEPRESSIVE DISORDER), RECURRENT, IN PARTIAL REMISSION: Primary | ICD-10-CM

## 2025-03-18 RX ORDER — TOPIRAMATE 25 MG/1
25 TABLET, FILM COATED ORAL 2 TIMES DAILY
Qty: 60 TABLET | Refills: 0 | Status: SHIPPED | OUTPATIENT
Start: 2025-03-18 | End: 2025-04-17

## 2025-03-18 NOTE — PROGRESS NOTES
[facility]    Follow Up Office Visit      Date: 2025   Patient Name: Elisabeth Martinez  : 1995   MRN: [id number]     Referring Provider: Zully Kirkland APRN    Chaperone Statement: Amanda Miranda served as a chaperone for this visit and was present for the full visit from 11:09 to 11:28. Patient agreeable to chaperone presence during appointment.     Chief Complaint:      ICD-10-CM ICD-9-CM   1. MDD (major depressive disorder), recurrent, in partial remission  F33.41 296.35   2. LE (generalized anxiety disorder)  F41.1 300.02   3. Chronic post-traumatic stress disorder (PTSD)  F43.12 309.81   4. Mixed obsessional thoughts and acts  F42.2 300.3        History of Present Illness:   Elisabeth Martinez is a 29 y.o. female who is here for follow up with medication management.    History of Present Illness  The patient presents via virtual visit for evaluation of restlessness, depression, anxiety, sleep disturbance, and mood swings.    She reports experiencing restlessness, which she attributes to her current medication, lurasidone. She is contemplating discontinuing the medication due to this side effect.     She has been on propranolol 60 mg daily for the past 2 to 3 months for migraines but is uncertain if it has alleviated her restlessness or akathisia. She notes that the restlessness is less severe than when she was on Abilify or Vraylar, possibly due to the absence of propranolol during those treatments. Despite the reduction in restlessness, she finds it challenging to remain seated while working from home but does not feel an overwhelming urge to move. She also reports that lurasidone has not significantly improved her depression. She believes that lurasidone has helped manage her mood swings, although she is unsure if it is as effective as Vraylar.     She has not undergone an EKG.  She has previously taken topiramate 40 mg twice daily for migraines but  discontinued it within the last 6 months due to lack of efficacy. She did not experience sedation while on topiramate.    She continues to take hydroxyzine 10 mg every 8 hours as needed, typically only in the morning, for anxiety management. She has been on vilazodone 40 mg daily for approximately 4 to 5 years, which she believes is beneficial. However, she reports a worsening of her depression over the past few weeks, which she attributes to work-related stress. 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     She describes her mood as good today and acknowledges that her mood is influenced by the weather, with randall days improving her mood. She reports no feelings of hopelessness, paranoia, hallucinations, or suicidal thoughts. She admits to persistent feelings of loneliness, rating them as 3 or 4 out of 10 in severity. She reports memory issues, which she believes may be related to her sleep patterns. She has been referred to therapy for PTSD and expresses apprehension about the upcoming sessions. She has been referred to nutrition.    She reports poor sleep quality, often falling asleep late and waking up early, and occasionally experiencing mid-insomnia. She has a scheduled appointment with a sleep medicine specialist on 06/02/2025. She has not taken any sleep medications but has tried melatonin in the past, which resulted in next-day grogginess.    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    MEDICATIONS  Current: propranolol, hydroxyzine, lorazepam, lurasidone  Past: Abilify, Vraylar, topiramate      Subjective     Screening Scores:   PHQ-9 : 7  LE-7 : 4    Medications:     Current Outpatient Medications:     albuterol sulfate  (90 Base) MCG/ACT inhaler, , Disp: , Rfl:     cetirizine (zyrTEC) 10 MG tablet, Take 1 tablet by mouth Daily., Disp: 90 tablet, Rfl: 1    Drospirenone  (Slynd) 4 MG tablet, Take 1 tablet by mouth Daily., Disp: 28 tablet, Rfl: 0    Drospirenone 4 MG tablet, Take 1 tablet by mouth Daily., Disp: 90 tablet, Rfl: 4    fluticasone (FLONASE) 50 MCG/ACT nasal spray, 2 sprays into the nostril(s) as directed by provider Daily., Disp: 33.3 mL, Rfl: 5    Fremanezumab-vfrm (Ajovy) 225 MG/1.5ML solution auto-injector, Inject 225 mg under the skin into the appropriate area as directed Every 30 (Thirty) Days., Disp: 1.5 mL, Rfl: 11    Glucagon (Gvoke HypoPen 2-Pack) 1 MG/0.2ML solution auto-injector, Inject 1 mg under the skin into the appropriate area as directed As Needed (hypoglycemia)., Disp: 0.4 mL, Rfl: 2    glucose blood test strip, Check once daily for diabetes. E 11.9, Disp: 500 each, Rfl: 2    glucose monitor monitoring kit, Check once daily for diabetes. E 11.9, Disp: 1 each, Rfl: 0    hydrOXYzine (ATARAX) 10 MG tablet, TAKE ONE TABLET BY MOUTH EVERY 8 HOURS AS NEEDED FOR ITCHING, Disp: 90 tablet, Rfl: 0    hyoscyamine (LEVSIN) 0.125 MG SL tablet, Take 1 tablet by mouth 4 (Four) Times a Day As Needed for Cramping., Disp: 120 tablet, Rfl: 5    Lancets misc, Check once daily for diabetes. E 11.9, Disp: 200 each, Rfl: 2    montelukast (SINGULAIR) 10 MG tablet, Take 1 tablet by mouth Every Night., Disp: 90 tablet, Rfl: 0    multivitamin with minerals tablet tablet, Take 1 tablet by mouth Daily., Disp: , Rfl:     rizatriptan (MAXALT) 10 MG tablet, TAKE 1 TABLET BY MOUTH AT ONSET OF MIGRAINE; MAY REPEAT 1 TABLET IN 2 HOURS IF NEEDED., Disp: 12 tablet, Rfl: 3    Tirzepatide 5 MG/0.5ML solution auto-injector, Inject 5 mg under the skin into the appropriate area as directed 1 (One) Time Per Week., Disp: 2 mL, Rfl: 2    vilazodone (VIIBRYD) 40 MG tablet tablet, TAKE 1 TABLET BY MOUTH DAILY, Disp: 90 tablet, Rfl: 0    vitamin C (ASCORBIC ACID) 250 MG tablet, Take 1 tablet by mouth Daily., Disp: , Rfl:     Vitamin D, Cholecalciferol, 50 MCG (2000 UT) capsule, Take 2 capsules by  "mouth. PT states taking 8000 IU daily., Disp: , Rfl:     Xifaxan 550 MG tablet, Take 1 tablet by mouth 2 (Two) Times a Day As Needed., Disp: , Rfl:     propranolol LA (Inderal LA) 60 MG 24 hr capsule, Take 1 capsule by mouth Daily for 30 days., Disp: 90 capsule, Rfl: 1    topiramate (Topamax) 25 MG tablet, Take 1 tablet by mouth 2 (Two) Times a Day for 30 days., Disp: 60 tablet, Rfl: 0    Medication Considerations:  SYLVIE reviewed and appropriate.     Allergies:   No Known Allergies    Objective     Vital Signs:   Vitals:    03/18/25 1046   BP: 124/82   Pulse: (!) 123   SpO2: 98%   Weight: 94.9 kg (209 lb 4.8 oz)   Height: 160 cm (62.99\")     Body mass index is 37.09 kg/m².     Mental Status Exam:   MENTAL STATUS EXAM   General Appearance:  Cleanly groomed and dressed  Eye Contact:  Good eye contact  Attitude:  Cooperative  Motor Activity:  Normal gait, posture  Muscle Strength:  Normal  Speech:  Normal rate, tone, volume  Language:  Spontaneous  Mood and affect:  Normal, pleasant  Hopelessness:  Denies  Loneliness: 3  Thought Process:  Logical, goal-directed and linear  Associations/ Thought Content:  No delusions  Hallucinations:  None  Suicidal Ideations:  Not present  Homicidal Ideation:  Not present  Sensorium:  Alert and clear  Orientation:  Person, place, time and situation  Immediate Recall, Recent, and Remote Memory:  Intact  Attention Span/ Concentration:  Good  Fund of Knowledge:  Appropriate for age and educational level  Intellectual Functioning:  Above average  Insight:  Good  Judgement:  Good  Reliability:  Good  Impulse Control:  Fair        SUICIDE RISK ASSESSMENT/CSSRS:  1. Does patient have thoughts of suicide? She denies  2. Does patient have intent for suicide? She denies  3. Does patient have a current plan for suicide? She denies  4. History of suicide attempts: she denies  5. Family history of suicide or attempts: she denies  6. History of violent behaviors towards others or property or " thoughts of committing suicide: she denies  7. History of sexual aggression toward others: she denies  8. Access to firearms or weapons: she denies    Assessment / Plan      Visit Diagnosis/Orders Placed This Visit:  Diagnoses and all orders for this visit:    1. MDD (major depressive disorder), recurrent, in partial remission (Primary)  -     topiramate (Topamax) 25 MG tablet; Take 1 tablet by mouth 2 (Two) Times a Day for 30 days.  Dispense: 60 tablet; Refill: 0    2. LE (generalized anxiety disorder)  -     topiramate (Topamax) 25 MG tablet; Take 1 tablet by mouth 2 (Two) Times a Day for 30 days.  Dispense: 60 tablet; Refill: 0    3. Chronic post-traumatic stress disorder (PTSD)    4. Mixed obsessional thoughts and acts         Assessment & Plan  1. Restlessness.  The restlessness is likely a side effect of lurasidone. Lurasidone will be discontinued. She is currently on propranolol long acting 60 mg once a day for migraines, which may also help with restlessness.    2. Depression.  Her depression has worsened over the past few weeks, potentially due to situational stress from work. She is currently taking hydroxyzine 10 mg every 8 hours as needed for anxiety, usually only in the morning. She is also on vilazodone 40 mg once a day, which she feels has been somewhat helpful. She is encouraged to continue with her scheduled therapy sessions for PTSD, starting on 05/09/2025.    3. Anxiety.  She is taking hydroxyzine 10 mg every 8 hours as needed, usually only in the morning, which helps with her anxiety. She is advised to continue this regimen.    4. Sleep Disturbance.  She reports difficulty sleeping, often waking up in the middle of the night and unable to fall back asleep. A referral to a sleep medicine specialist has been made, and she has an appointment on 06/02/2025. She is advised to complete this assessment to rule out sleep apnea before considering any sleep medications.    5. Mood Swings.  Topiramate will  be initiated at a dosage of 25 mg twice daily for mood stabilization. She will start with 25 mg once daily for the first week to monitor for any intolerable side effects, then increase to 25 mg twice daily. An EKG will be ordered to monitor her heart health while on this medication. If topiramate proves ineffective, alternative mood stabilizers or antipsychotics that are weight neutral will be considered.    Follow-up  The patient will follow up in 4 weeks.       Labs Reviewed : labs reviewed  Chart since last visit reviewed : chart reviewed extensively    Results         Functional Status: No impairment    Prognosis: Good with Ongoing Treatment     Impression/Formulation:  Patient appeared alert and oriented. Patient is receptive to assistance with maintaining a stable lifestyle.  Patient presents with history of     ICD-10-CM ICD-9-CM   1. MDD (major depressive disorder), recurrent, in partial remission  F33.41 296.35   2. LE (generalized anxiety disorder)  F41.1 300.02   3. Chronic post-traumatic stress disorder (PTSD)  F43.12 309.81   4. Mixed obsessional thoughts and acts  F42.2 300.3   .     Treatment Plan:     Patient will continue supportive efforts and medications as indicated. Clinic will obtain release of information for current treatment team for continuity of care as needed. Patient will contact this office, call 911 or present to the nearest emergency room should suicidal or homicidal ideations occur.  Discussed medication options and treatment plan of prescribed medication(s) as well as the risks, benefits, and potential side effects. Patient acknowledged and verbally consented to continue with current treatment plan and was educated on the importance of compliance with treatment and follow-up appointments.       Quality Measures:  Tobacco: Elisabeth Martinez  reports that she has never smoked. She has never been exposed to tobacco smoke. She has never used smokeless tobacco. I have educated her on  the risk of diseases from using tobacco products such as cancer, COPD, heart disease, and she is a non-smoker .       I spent 3  minutes counseling the patient.          Depression (PHQ >11): Patient screened positive for depression based on a PHQ-9 score of 7 on 3/18/2025. Follow-up recommendations include: Prescribed antidepressant medication treatment, Suicide Risk Assessment performed, and Continue with medication management.       Follow Up:   Return in about 4 weeks (around 4/15/2025).    Short-term goals: Patient will adhere to medication regimen and experience continued improvement in symptoms over the next 3 months.   Long-term goals: Patient will adhere to medication treatment plan and report improvement in symptoms over the next 6 months    Patient or patient representative verbalized consent for the use of Ambient Listening during the visit with  Butch Rodríguez MD for chart documentation. 3/18/2025  11:09 EDT    Butch Rodríguez MD  [facility]     This is electronically signed by Butch Rodríguez MD  03/18/2025 11:09 EDT

## 2025-03-20 DIAGNOSIS — F41.9 ANXIETY: ICD-10-CM

## 2025-03-20 RX ORDER — HYDROXYZINE HYDROCHLORIDE 10 MG/1
10 TABLET, FILM COATED ORAL EVERY 8 HOURS PRN
Qty: 90 TABLET | Refills: 0 | Status: SHIPPED | OUTPATIENT
Start: 2025-03-20

## 2025-04-01 DIAGNOSIS — F33.41 MDD (MAJOR DEPRESSIVE DISORDER), RECURRENT, IN PARTIAL REMISSION: ICD-10-CM

## 2025-04-01 DIAGNOSIS — F42.2 MIXED OBSESSIONAL THOUGHTS AND ACTS: ICD-10-CM

## 2025-04-01 RX ORDER — LURASIDONE HYDROCHLORIDE 20 MG/1
20 TABLET, FILM COATED ORAL DAILY
Qty: 30 TABLET | Refills: 0 | OUTPATIENT
Start: 2025-04-01

## 2025-04-13 DIAGNOSIS — F41.1 GAD (GENERALIZED ANXIETY DISORDER): ICD-10-CM

## 2025-04-13 DIAGNOSIS — F33.41 MDD (MAJOR DEPRESSIVE DISORDER), RECURRENT, IN PARTIAL REMISSION: ICD-10-CM

## 2025-04-14 RX ORDER — TOPIRAMATE 25 MG/1
25 TABLET, FILM COATED ORAL 2 TIMES DAILY
Qty: 60 TABLET | Refills: 0 | Status: SHIPPED | OUTPATIENT
Start: 2025-04-14

## 2025-04-14 NOTE — TELEPHONE ENCOUNTER
Rx Refill Note  Requested Prescriptions     Pending Prescriptions Disp Refills    topiramate (TOPAMAX) 25 MG tablet [Pharmacy Med Name: TOPIRAMATE 25 MG TABLET] 60 tablet 0     Sig: TAKE 1 TABLET BY MOUTH 2 TIMES A DAY      Last office visit with prescribing clinician: 3/18/2025   Last telemedicine visit with prescribing clinician: Visit date not found   Next office visit with prescribing clinician: Visit date not found     Priya Bernard MA  04/14/25, 07:17 EDT

## 2025-04-15 RX ORDER — PROPRANOLOL HYDROCHLORIDE 60 MG/1
60 CAPSULE, EXTENDED RELEASE ORAL DAILY
Qty: 30 CAPSULE | OUTPATIENT
Start: 2025-04-15

## 2025-04-15 NOTE — TELEPHONE ENCOUNTER
Rx Refill Note  Requested Prescriptions     Pending Prescriptions Disp Refills    propranolol LA (INDERAL LA) 60 MG 24 hr capsule [Pharmacy Med Name: PROPRANOLOL ER 60 MG CAPSULE] 30 capsule      Sig: TAKE 1 CAPSULE BY MOUTH DAILY      Last filled: 02/11/2025 90 days, 1 refill   Last office visit with prescribing clinician: 2/11/2025      Next office visit with prescribing clinician: 8/12/2025     Donna Carnes RN  04/15/25, 08:59 EDT    Confirmed with pharmacy, has refills on file

## 2025-04-23 DIAGNOSIS — F32.1 MODERATE MAJOR DEPRESSION: ICD-10-CM

## 2025-04-23 RX ORDER — VILAZODONE HYDROCHLORIDE 40 MG/1
40 TABLET ORAL DAILY
Qty: 90 TABLET | Refills: 0 | Status: SHIPPED | OUTPATIENT
Start: 2025-04-23

## 2025-05-09 ENCOUNTER — OFFICE VISIT (OUTPATIENT)
Age: 30
End: 2025-05-09
Payer: COMMERCIAL

## 2025-05-09 DIAGNOSIS — F43.10 POST TRAUMATIC STRESS DISORDER (PTSD): Primary | ICD-10-CM

## 2025-05-09 NOTE — PATIENT INSTRUCTIONS
"Verbal Safety Plan:     Continue use of learned coping skills and reach out to friends/family members/support network between appointments.     Contact Office at [phone], Call/Text [phone], call 911, Text \"HOME\" to [phone], and/or go to the nearest Hospital/ER or [facility] in the event of a crisis.     "
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
## 2025-05-10 DIAGNOSIS — E11.9 TYPE 2 DIABETES MELLITUS WITHOUT COMPLICATION, WITHOUT LONG-TERM CURRENT USE OF INSULIN: Primary | ICD-10-CM

## 2025-05-13 NOTE — PROGRESS NOTES
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 these symptoms and risks.  Talk to your health care provider about whether hormone replacement therapy is right for you.  Follow these instructions at home:  Schedule regular health, dental, and eye exams.  Stay current with your immunizations.  Do not use any tobacco products including cigarettes, chewing tobacco, or electronic cigarettes.  If you are pregnant, do not drink alcohol.  If you are breastfeeding, limit how much and how often you drink alcohol.  Limit alcohol intake to no more than 1 drink per day for nonpregnant women. One drink equals 12 ounces of beer, 5 ounces of wine, or 1½ ounces of hard liquor.  Do not use street drugs.  Do not share needles.  Ask your health care provider for help if you need support or information about quitting drugs.  Tell your health care provider if you often feel depressed.  Tell your health care provider if you have ever been abused or do not feel safe at home.  This information is not intended to replace advice given to you by your health care provider. Make sure you discuss any questions you have with your health care provider.  Document Released: 07/02/2012 Document Revised: 05/25/2017 Document Reviewed: 09/20/2016  [employer] Interactive Patient Education © 2018 [employer]   Elisabeth Martinez voices understanding and  acceptance of this advice and will call back with any further questions or concerns. AVS with preventive healthcare tips printed for patient.     PATRIC Su  [facility]

## 2025-05-14 ENCOUNTER — OFFICE VISIT (OUTPATIENT)
Dept: INTERNAL MEDICINE | Facility: CLINIC | Age: 30
End: 2025-05-14
Payer: COMMERCIAL

## 2025-05-14 VITALS
HEART RATE: 96 BPM | RESPIRATION RATE: 16 BRPM | DIASTOLIC BLOOD PRESSURE: 84 MMHG | TEMPERATURE: 97.3 F | WEIGHT: 206.8 LBS | HEIGHT: 63 IN | BODY MASS INDEX: 36.64 KG/M2 | SYSTOLIC BLOOD PRESSURE: 112 MMHG

## 2025-05-14 DIAGNOSIS — J45.20 MILD INTERMITTENT ASTHMA WITHOUT COMPLICATION: ICD-10-CM

## 2025-05-14 DIAGNOSIS — L70.9 ACNE, UNSPECIFIED ACNE TYPE: ICD-10-CM

## 2025-05-14 DIAGNOSIS — Z00.00 ENCOUNTER FOR WELLNESS EXAMINATION: Primary | ICD-10-CM

## 2025-05-14 DIAGNOSIS — Z13.6 ENCOUNTER FOR LIPID SCREENING FOR CARDIOVASCULAR DISEASE: ICD-10-CM

## 2025-05-14 DIAGNOSIS — E11.9 TYPE 2 DIABETES MELLITUS WITHOUT COMPLICATION, WITHOUT LONG-TERM CURRENT USE OF INSULIN: ICD-10-CM

## 2025-05-14 DIAGNOSIS — D22.9 ATYPICAL MOLE: ICD-10-CM

## 2025-05-14 DIAGNOSIS — E55.9 VITAMIN D DEFICIENCY: ICD-10-CM

## 2025-05-14 DIAGNOSIS — Z13.220 ENCOUNTER FOR LIPID SCREENING FOR CARDIOVASCULAR DISEASE: ICD-10-CM

## 2025-05-14 RX ORDER — SPIRONOLACTONE 25 MG/1
25 TABLET ORAL DAILY
Qty: 30 TABLET | Refills: 2 | Status: SHIPPED | OUTPATIENT
Start: 2025-05-14

## 2025-05-14 RX ORDER — TIRZEPATIDE 5 MG/.5ML
INJECTION, SOLUTION SUBCUTANEOUS
Qty: 2 ML | Refills: 2 | Status: SHIPPED | OUTPATIENT
Start: 2025-05-14

## 2025-05-27 RX ORDER — RIZATRIPTAN BENZOATE 10 MG/1
TABLET ORAL
Qty: 18 TABLET | OUTPATIENT
Start: 2025-05-27

## 2025-05-27 NOTE — TELEPHONE ENCOUNTER
Rx Refill Note  Requested Prescriptions     Pending Prescriptions Disp Refills    rizatriptan (MAXALT) 10 MG tablet [Pharmacy Med Name: RIZATRIPTAN 10 MG TABLET] 18 tablet      Sig: TAKE 1 TABLET BY MOUTH AT ONSET OF MIGRAINE; MAY REPEAT 1 TABLET IN 2 HOURS IF NEEDED.      Last filled: 2/12/25 #12 with 3 refills  Last office visit with prescribing clinician: 2/11/2025      Next office visit with prescribing clinician: 8/12/2025     Suhail Bruce MA  05/27/25, 09:26 EDT

## 2025-06-03 ENCOUNTER — LAB (OUTPATIENT)
Dept: INTERNAL MEDICINE | Facility: CLINIC | Age: 30
End: 2025-06-03
Payer: COMMERCIAL

## 2025-06-03 ENCOUNTER — PATIENT MESSAGE (OUTPATIENT)
Dept: OBSTETRICS AND GYNECOLOGY | Facility: CLINIC | Age: 30
End: 2025-06-03
Payer: COMMERCIAL

## 2025-06-03 ENCOUNTER — OFFICE VISIT (OUTPATIENT)
Dept: SLEEP MEDICINE | Facility: CLINIC | Age: 30
End: 2025-06-03
Payer: COMMERCIAL

## 2025-06-03 VITALS
DIASTOLIC BLOOD PRESSURE: 86 MMHG | TEMPERATURE: 97.8 F | BODY MASS INDEX: 36.68 KG/M2 | OXYGEN SATURATION: 98 % | WEIGHT: 207 LBS | SYSTOLIC BLOOD PRESSURE: 112 MMHG | HEART RATE: 125 BPM | HEIGHT: 63 IN

## 2025-06-03 DIAGNOSIS — E11.9 TYPE 2 DIABETES MELLITUS WITHOUT COMPLICATION, WITHOUT LONG-TERM CURRENT USE OF INSULIN: ICD-10-CM

## 2025-06-03 DIAGNOSIS — Z13.220 ENCOUNTER FOR LIPID SCREENING FOR CARDIOVASCULAR DISEASE: ICD-10-CM

## 2025-06-03 DIAGNOSIS — F33.41 MDD (MAJOR DEPRESSIVE DISORDER), RECURRENT, IN PARTIAL REMISSION: ICD-10-CM

## 2025-06-03 DIAGNOSIS — J30.2 SEASONAL ALLERGIES: ICD-10-CM

## 2025-06-03 DIAGNOSIS — R06.83 SNORING: ICD-10-CM

## 2025-06-03 DIAGNOSIS — E55.9 VITAMIN D DEFICIENCY: ICD-10-CM

## 2025-06-03 DIAGNOSIS — F41.1 GAD (GENERALIZED ANXIETY DISORDER): ICD-10-CM

## 2025-06-03 DIAGNOSIS — R51.9 MORNING HEADACHE: ICD-10-CM

## 2025-06-03 DIAGNOSIS — F41.9 ANXIETY: ICD-10-CM

## 2025-06-03 DIAGNOSIS — R06.89 GASPING FOR BREATH: ICD-10-CM

## 2025-06-03 DIAGNOSIS — E66.9 OBESITY (BMI 30-39.9): ICD-10-CM

## 2025-06-03 DIAGNOSIS — G47.19 EXCESSIVE DAYTIME SLEEPINESS: Primary | ICD-10-CM

## 2025-06-03 DIAGNOSIS — Z13.6 ENCOUNTER FOR LIPID SCREENING FOR CARDIOVASCULAR DISEASE: ICD-10-CM

## 2025-06-03 DIAGNOSIS — G47.30 OBSERVED SLEEP APNEA: ICD-10-CM

## 2025-06-03 LAB
25(OH)D3 SERPL-MCNC: 66.7 NG/ML (ref 30–100)
ALBUMIN SERPL-MCNC: 4.1 G/DL (ref 3.5–5.2)
ALBUMIN/GLOB SERPL: 1.2 G/DL
ALP SERPL-CCNC: 104 U/L (ref 39–117)
ALT SERPL W P-5'-P-CCNC: 19 U/L (ref 1–33)
ANION GAP SERPL CALCULATED.3IONS-SCNC: 14.8 MMOL/L (ref 5–15)
AST SERPL-CCNC: 18 U/L (ref 1–32)
BASOPHILS # BLD AUTO: 0.1 10*3/MM3 (ref 0–0.2)
BASOPHILS NFR BLD AUTO: 1 % (ref 0–1.5)
BILIRUB SERPL-MCNC: 0.3 MG/DL (ref 0–1.2)
BUN SERPL-MCNC: 11 MG/DL (ref 6–20)
BUN/CREAT SERPL: 13.3 (ref 7–25)
CALCIUM SPEC-SCNC: 9.7 MG/DL (ref 8.6–10.5)
CHLORIDE SERPL-SCNC: 104 MMOL/L (ref 98–107)
CHOLEST SERPL-MCNC: 218 MG/DL (ref 0–200)
CO2 SERPL-SCNC: 20.2 MMOL/L (ref 22–29)
CREAT SERPL-MCNC: 0.83 MG/DL (ref 0.57–1)
DEPRECATED RDW RBC AUTO: 41.6 FL (ref 37–54)
EGFRCR SERPLBLD CKD-EPI 2021: 97.4 ML/MIN/1.73
EOSINOPHIL # BLD AUTO: 0.12 10*3/MM3 (ref 0–0.4)
EOSINOPHIL NFR BLD AUTO: 1.2 % (ref 0.3–6.2)
ERYTHROCYTE [DISTWIDTH] IN BLOOD BY AUTOMATED COUNT: 14.4 % (ref 12.3–15.4)
EXPIRATION DATE: NORMAL
EXPIRATION DATE: NORMAL
GLOBULIN UR ELPH-MCNC: 3.3 GM/DL
GLUCOSE SERPL-MCNC: 94 MG/DL (ref 65–99)
HBA1C MFR BLD: 5.6 % (ref 4.5–5.7)
HCT VFR BLD AUTO: 41.2 % (ref 34–46.6)
HDLC SERPL-MCNC: 30 MG/DL (ref 40–60)
HGB BLD-MCNC: 13 G/DL (ref 12–15.9)
IMM GRANULOCYTES # BLD AUTO: 0.02 10*3/MM3 (ref 0–0.05)
IMM GRANULOCYTES NFR BLD AUTO: 0.2 % (ref 0–0.5)
LDLC SERPL CALC-MCNC: 119 MG/DL (ref 0–100)
LDLC/HDLC SERPL: 3.66 {RATIO}
LYMPHOCYTES # BLD AUTO: 3.19 10*3/MM3 (ref 0.7–3.1)
LYMPHOCYTES NFR BLD AUTO: 32.5 % (ref 19.6–45.3)
Lab: NORMAL
Lab: NORMAL
MCH RBC QN AUTO: 25.3 PG (ref 26.6–33)
MCHC RBC AUTO-ENTMCNC: 31.6 G/DL (ref 31.5–35.7)
MCV RBC AUTO: 80.2 FL (ref 79–97)
MONOCYTES # BLD AUTO: 0.52 10*3/MM3 (ref 0.1–0.9)
MONOCYTES NFR BLD AUTO: 5.3 % (ref 5–12)
NEUTROPHILS NFR BLD AUTO: 5.88 10*3/MM3 (ref 1.7–7)
NEUTROPHILS NFR BLD AUTO: 59.8 % (ref 42.7–76)
NRBC BLD AUTO-RTO: 0 /100 WBC (ref 0–0.2)
PLATELET # BLD AUTO: 297 10*3/MM3 (ref 140–450)
PMV BLD AUTO: 12.2 FL (ref 6–12)
POC ALBUMIN, URINE: 150 MG/L
POC CREATININE, URINE: 300 MG/DL
POC URINE ALB/CREA RATIO: NORMAL
POTASSIUM SERPL-SCNC: 4 MMOL/L (ref 3.5–5.2)
PROT SERPL-MCNC: 7.4 G/DL (ref 6–8.5)
RBC # BLD AUTO: 5.14 10*6/MM3 (ref 3.77–5.28)
SODIUM SERPL-SCNC: 139 MMOL/L (ref 136–145)
TRIGL SERPL-MCNC: 391 MG/DL (ref 0–150)
TSH SERPL DL<=0.05 MIU/L-ACNC: 1.43 UIU/ML (ref 0.27–4.2)
VLDLC SERPL-MCNC: 69 MG/DL (ref 5–40)
WBC NRBC COR # BLD AUTO: 9.83 10*3/MM3 (ref 3.4–10.8)

## 2025-06-03 PROCEDURE — 36415 COLL VENOUS BLD VENIPUNCTURE: CPT | Performed by: NURSE PRACTITIONER

## 2025-06-03 PROCEDURE — 80061 LIPID PANEL: CPT | Performed by: NURSE PRACTITIONER

## 2025-06-03 PROCEDURE — 82306 VITAMIN D 25 HYDROXY: CPT | Performed by: NURSE PRACTITIONER

## 2025-06-03 PROCEDURE — 99213 OFFICE O/P EST LOW 20 MIN: CPT | Performed by: NURSE PRACTITIONER

## 2025-06-03 PROCEDURE — 80050 GENERAL HEALTH PANEL: CPT | Performed by: NURSE PRACTITIONER

## 2025-06-03 PROCEDURE — 82044 UR ALBUMIN SEMIQUANTITATIVE: CPT | Performed by: NURSE PRACTITIONER

## 2025-06-03 PROCEDURE — 83036 HEMOGLOBIN GLYCOSYLATED A1C: CPT | Performed by: NURSE PRACTITIONER

## 2025-06-03 PROCEDURE — 82570 ASSAY OF URINE CREATININE: CPT | Performed by: NURSE PRACTITIONER

## 2025-06-03 RX ORDER — DROSPIRENONE 4 MG/1
1 TABLET, FILM COATED ORAL DAILY
Qty: 28 TABLET | Refills: 0 | COMMUNITY
Start: 2025-06-03 | End: 2025-06-03

## 2025-06-03 RX ORDER — HYDROXYZINE HYDROCHLORIDE 10 MG/1
10 TABLET, FILM COATED ORAL EVERY 8 HOURS PRN
Qty: 90 TABLET | Refills: 0 | Status: SHIPPED | OUTPATIENT
Start: 2025-06-03

## 2025-06-03 RX ORDER — AVOBENZONE, HOMOSALATE, OCTISALATE, OCTOCRYLENE 30; 40; 45; 26 MG/ML; MG/ML; MG/ML; MG/ML
CREAM TOPICAL
Qty: 200 EACH | Refills: 2 | Status: SHIPPED | OUTPATIENT
Start: 2025-06-03

## 2025-06-03 NOTE — TELEPHONE ENCOUNTER
Pt states that she would prefer to leave at [facility] as the cost is likely less expensive and she will have it shipped to her home. Demonstrates appreciation.

## 2025-06-03 NOTE — PROGRESS NOTES
Chief Complaint:   Chief Complaint   Patient presents with    Follow-up    Sleeping Problem       HPI:    Elisabeth Martinez is a 30 y.o. female here for follow-up of suspected sleep apnea.  Patient originally presented 8/10/2023 for complaints of trouble going and staying asleep, exhaustion, difficulty concentrating, excessive daytime sleepiness, frequent awakenings and morning headaches.  She did have a sleep study ordered but was unable to make the pickup time for the device.  Patient's order has since  and does wish today to have a new order placed.  She is currently getting 5 to 6 hours of sleep nightly it does vary on how long it takes her to go to sleep.  She has an [place] score of 18/24.        Sleep apnea risk factors:  Patient has symptoms Snoring, Restless sleep, Daytime sleepiness, Difficulty breathing, Frequent arousal from sleep, Apnea, and Morning headaches  suggestive of the presence of obstructive sleep apnea. The patient has the following co-morbid conditions of DM and morbidly obese (BMI > 40 or > 35 with obesity - related health condition). We did speak today about the consequences of untreated sleep apnea worsening the previously listed co-morbidities and sudden cardiac death.  We also discussed different therapies available to him such as CPAP, MAD, or ENT referral.  Patient verbalizes understanding.  We will move forward with rescheduling HST    Current medications are:   Current Outpatient Medications:     albuterol sulfate  (90 Base) MCG/ACT inhaler, , Disp: , Rfl:     Albuterol-Budesonide 90-80 MCG/ACT aerosol, Inhale 2 puffs Every 2 (Two) Hours As Needed (wheezing)., Disp: 10.7 g, Rfl: 5    cetirizine (zyrTEC) 10 MG tablet, Take 1 tablet by mouth Daily., Disp: 90 tablet, Rfl: 1    Drospirenone (Slynd) 4 MG tablet, Take 1 tablet by mouth Daily., Disp: 28 tablet, Rfl: 0    fluticasone (FLONASE) 50 MCG/ACT nasal spray, 2 sprays into the nostril(s) as directed by provider  Daily., Disp: 33.3 mL, Rfl: 5    Fremanezumab-vfrm (Ajovy) 225 MG/1.5ML solution auto-injector, Inject 225 mg under the skin into the appropriate area as directed Every 30 (Thirty) Days., Disp: 1.5 mL, Rfl: 11    Glucagon (Gvoke HypoPen 2-Pack) 1 MG/0.2ML solution auto-injector, Inject 1 mg under the skin into the appropriate area as directed As Needed (hypoglycemia)., Disp: 0.4 mL, Rfl: 2    glucose blood test strip, Check once daily for diabetes. E 11.9, Disp: 500 each, Rfl: 2    glucose monitor monitoring kit, Check once daily for diabetes. E 11.9, Disp: 1 each, Rfl: 0    hydrOXYzine (ATARAX) 10 MG tablet, TAKE ONE TABLET BY MOUTH EVERY 8 HOURS AS NEEDED FOR ITCHING, Disp: 90 tablet, Rfl: 0    hyoscyamine (LEVSIN) 0.125 MG SL tablet, Take 1 tablet by mouth 4 (Four) Times a Day As Needed for Cramping., Disp: 120 tablet, Rfl: 5    Lancets misc, Check once daily for diabetes. E 11.9, Disp: 200 each, Rfl: 2    montelukast (SINGULAIR) 10 MG tablet, Take 1 tablet by mouth Every Night., Disp: 90 tablet, Rfl: 0    Mounjaro 5 MG/0.5ML solution auto-injector, INJECT 5 MG UNDER THE SKIN ONCE WEEKLY, Disp: 2 mL, Rfl: 2    multivitamin with minerals tablet tablet, Take 1 tablet by mouth Daily., Disp: , Rfl:     rizatriptan (MAXALT) 10 MG tablet, TAKE 1 TABLET BY MOUTH AT ONSET OF MIGRAINE; MAY REPEAT 1 TABLET IN 2 HOURS IF NEEDED., Disp: 12 tablet, Rfl: 3    spironolactone (Aldactone) 25 MG tablet, Take 1 tablet by mouth Daily., Disp: 30 tablet, Rfl: 2    topiramate (TOPAMAX) 25 MG tablet, TAKE 1 TABLET BY MOUTH 2 TIMES A DAY, Disp: 60 tablet, Rfl: 0    vilazodone (VIIBRYD) 40 MG tablet tablet, TAKE 1 TABLET BY MOUTH DAILY, Disp: 90 tablet, Rfl: 0    vitamin C (ASCORBIC ACID) 250 MG tablet, Take 1 tablet by mouth Daily., Disp: , Rfl:     Vitamin D, Cholecalciferol, 50 MCG (2000 UT) capsule, Take 2 capsules by mouth. PT states taking 8000 IU daily., Disp: , Rfl:     Xifaxan 550 MG tablet, Take 1 tablet by mouth 2 (Two) Times a  "Day As Needed., Disp: , Rfl:     propranolol LA (Inderal LA) 60 MG 24 hr capsule, Take 1 capsule by mouth Daily for 30 days., Disp: 90 capsule, Rfl: 1.      The patient's relevant past medical, surgical, family and social history were reviewed and updated in Epic as appropriate.       Review of Systems   Constitutional:  Positive for fatigue.   HENT:  Positive for congestion.    Eyes:  Positive for visual disturbance.   Cardiovascular:  Positive for palpitations.   Allergic/Immunologic: Positive for environmental allergies.   Neurological:  Positive for numbness and headaches.   Psychiatric/Behavioral:  Positive for dysphoric mood and sleep disturbance. The patient is nervous/anxious.    All other systems reviewed and are negative.        Objective:    Physical Exam  Constitutional:       Appearance: Normal appearance.   HENT:      Head: Normocephalic and atraumatic.      Mouth/Throat:      Comments: Mallampati 3 anatomy  Cardiovascular:      Rate and Rhythm: Regular rhythm. Tachycardia present.   Pulmonary:      Effort: Pulmonary effort is normal.      Breath sounds: Normal breath sounds.   Skin:     General: Skin is warm and dry.   Neurological:      Mental Status: She is alert and oriented to person, place, and time.   Psychiatric:         Mood and Affect: Mood normal.         Behavior: Behavior normal.         Thought Content: Thought content normal.         Judgment: Judgment normal.           /86   Pulse (!) 125   Temp 97.8 °F (36.6 °C)   Ht 159.4 cm (62.76\")   Wt 93.9 kg (207 lb)   LMP 05/07/2025 (Exact Date)   SpO2 98%   BMI 36.95 kg/m²     ASSESSMENT/PLAN    Diagnoses and all orders for this visit:    1. Excessive daytime sleepiness (Primary)  -     Home Sleep Study; Future    2. Snoring  -     Home Sleep Study; Future    3. Morning headache  -     Home Sleep Study; Future    4. Observed sleep apnea  -     Home Sleep Study; Future    5. Gasping for breath  -     Home Sleep Study; Future    6. " Obesity (BMI 30-39.9)  -     Home Sleep Study; Future        We did review today the consequences of untreated sleep apnea patient is ready to move forward with HST and we will follow-up as appropriate.      Signed by  PATRIC Carmichael    Jia 3, 2025      CC: Zully Kirkland APRN Redinger, David, MD

## 2025-06-04 ENCOUNTER — RESULTS FOLLOW-UP (OUTPATIENT)
Dept: INTERNAL MEDICINE | Facility: CLINIC | Age: 30
End: 2025-06-04
Payer: COMMERCIAL

## 2025-06-04 DIAGNOSIS — E11.9 TYPE 2 DIABETES MELLITUS WITHOUT COMPLICATION, WITHOUT LONG-TERM CURRENT USE OF INSULIN: Primary | ICD-10-CM

## 2025-06-04 RX ORDER — TOPIRAMATE 25 MG/1
25 TABLET, FILM COATED ORAL 2 TIMES DAILY
Qty: 60 TABLET | Refills: 0 | OUTPATIENT
Start: 2025-06-04

## 2025-06-06 RX ORDER — FLUTICASONE PROPIONATE 50 MCG
2 SPRAY, SUSPENSION (ML) NASAL DAILY
Qty: 99.9 G | Refills: 3 | Status: SHIPPED | OUTPATIENT
Start: 2025-06-06

## 2025-06-06 RX ORDER — TIRZEPATIDE 5 MG/.5ML
5 INJECTION, SOLUTION SUBCUTANEOUS WEEKLY
Qty: 2 ML | Refills: 2 | Status: SHIPPED | OUTPATIENT
Start: 2025-06-06

## 2025-06-06 NOTE — TELEPHONE ENCOUNTER
Attempt to call patient, no answer    Left vm for patient to return call      PLEASE RELAY:     A1c is normal however urine albumin is elevated. I would like to recheck this at next appointment. Cholesterol and triglycerides are elevated. Lifestyle changes such as diet and exercise are recommended to improve cholesterol. Avoid saturated fats, processed sugar and carbohydrates (especially pasta and breads). The other test results show that your labs are in the normal range, stable, or mild abnormalities which do not require any further work-up at this time. We will continue to monitor labs over time. Continue plan of care discussed at your appointment and follow-up if worsening or new concerns.

## 2025-06-09 ENCOUNTER — SPECIALTY PHARMACY (OUTPATIENT)
Dept: NEUROLOGY | Facility: CLINIC | Age: 30
End: 2025-06-09
Payer: COMMERCIAL

## 2025-06-09 RX ORDER — FREMANEZUMAB-VFRM 225 MG/1.5ML
225 INJECTION SUBCUTANEOUS
Qty: 1.5 ML | Refills: 11 | Status: SHIPPED | OUTPATIENT
Start: 2025-06-09

## 2025-06-09 RX ORDER — FREMANEZUMAB-VFRM 225 MG/1.5ML
INJECTION SUBCUTANEOUS
Qty: 1.5 ML | Refills: 11 | OUTPATIENT
Start: 2025-06-09

## 2025-06-20 ENCOUNTER — OFFICE VISIT (OUTPATIENT)
Age: 30
End: 2025-06-20
Payer: COMMERCIAL

## 2025-06-20 DIAGNOSIS — F43.10 POST TRAUMATIC STRESS DISORDER (PTSD): Primary | ICD-10-CM

## 2025-06-20 NOTE — PROGRESS NOTES
Progress Note     Date: 06/20/2025  Client Name: Elisabeth Martinez  MRN: [id number]  Start Time:    7:53 AM  End Time:    9:00 AM     Diagnoses and all orders for this visit:    1. Post traumatic stress disorder (PTSD) (Primary)         Active Symptoms/Chief Complainant:   Avoidance behaviors regarding trauma related stimuli, minimizing thought patterns, nightmares, sleep disturbance, disassociation, liable mood, anger/irritability, anxious thought patterns, depressed mood, difficulty regulating emotions, hypervigilance, catastrophizing thought patterns, flashbacks, negative self-talk, perfectionist tendencies, low self-worth         MENTAL STATUS EXAM   General Appearance:  Cleanly groomed and dressed  Eye Contact:  Good eye contact  Attitude:  Cooperative  Motor Activity:  Normal gait, posture  Speech:  Normal rate, tone, volume  Mood and affect:  Appropriate and mood congruent  Thought Process:  Logical and linear  Associations/ Thought Content:  No delusions  Hallucinations:  None  Suicidal Ideations:  Not present  Homicidal Ideation:  Not present  Sensorium:  Alert and clear  Orientation:  Person, place and time  Fund of Knowledge:  Appropriate for age and educational level  Insight:  Good  Judgement:  Good  Reliability:  Good         Risk to self:  Low  No new reported incidents of SI and/or self-harm    Risk others:  Low  No new reported incidents of HI and/or aggressive behavior    Progress Note Intervention/Client Response     Progress Note Intervention:     Met with client at [facility] for individual session.     Utilized MI techniques of OARS and providing empathy to explore current stressors and assess sx burden. Clinician continued to build rapport with client during session utilizing motivational interviewing and empathic listening. Utilized MI goal setting techniques to establish treatment goals and complete care plan in collaboration with client.     Utilized CBT reflective  listening techniques in order to process anxious thought patterns and perfectionist tendencies.  Explored connections between current thought patterns, trauma history, and relationships with family of origin.  Provided psychoeducation on cognitive distortions and cognitive restructuring techniques to challenge and reframe automatic negative thoughts.  Prompted client to identify personal examples of cognitive distortions in client's thought patterns.      Client response:     Client was receptive to MI and CBT intervention. Client was able to identify goals for therapy and was actively engaged in treatment planning process.  Client processed anxious thought patterns and perfectionist tendencies with clinician.  Client was able to recognize connections between current thought patterns, trauma history, and relationships with family of origin.  Client expressed understanding of psychoeducation provided by clinician on cognitive distortions and cognitive restructuring techniques.  Client identified examples of cognitive distortions in her current thought patterns and agreed to practice identifying use of cognitive distortions between appointments.    Client engaged in active discussion with therapist and appeared receptive to therapeutic intervention/clinician feedback.       Follow-up:    At next session, clinician will continue CBT intervention.    New plan of care was created and entered today with the client.  Too soon to assess any type of progress due to new plan being entered this date.  Ongoing treatment is still needed for this client due to newly established goals and client's symptomology not being resolved.        [facility]

## 2025-06-20 NOTE — TREATMENT PLAN
"Multi-Disciplinary Problems (from Behavioral Health Treatment Plan)      Active Problems       Problem: Trauma and Stressor Related Disorders  Start Date: 06/20/25      Problem Details: Problem Statement:     Client is struggling with Avoidance behaviors regarding trauma related stimuli, minimizing thought patterns, nightmares, sleep disturbance, disassociation, liable mood, anger/irritability, anxious thought patterns, depressed mood, difficulty regulating emotions, hypervigilance, catastrophizing thought patterns, flashbacks, negative self-talk, perfectionist tendencies, low self-worth     Plan Discharge:    Services will be discontinued upon completion of treatment goals, 45 days without services, 3 No Shows in a 12-month period, or Client report services are no longer needed.          Goal Priority Start Date Expected End Date End Date    Patient will process and move through trauma in a way that improves self regard and the patients ability to function optimally in the world around them. High 06/20/25 12/19/25 --    Goal Details: Goal:     \" I would like to work on controlling things and allow myself and others to not be perfect.\"     Objective:    Over the next 90 days, I will learn and utilize at least 3 coping skills to reduce trauma responses and cope better with stressors, as measured by Client reports and reductions in the PHQ-9 and LE 7.       Progress toward goal:  Not appropriate to rate progress toward goal since this is the initial treatment plan.        Goal Intervention Frequency Start Date End Date    Assist patient in identifying ways that trauma has negatively impacted their view of themselves and the world. Q2 Weeks 06/20/25 --    Intervention Details: Duration of treatment until remission of symptoms.        Goal Intervention Frequency Start Date End Date    Process trauma in the context of the safe session environment. Q2 Weeks 06/20/25 --    Intervention Details: Duration of treatment until " remission of symptoms.        Goal Intervention Frequency Start Date End Date    Develop a plan of behavior changes that will reduce the stress of the trauma. Q2 Weeks 06/20/25 --    Intervention Details: Duration of treatment until remission of symptoms.                        Reviewed By       Emma Valentine LCSW 06/20/25 0960                     I have discussed and reviewed this treatment plan with the patient.

## 2025-07-01 ENCOUNTER — OFFICE VISIT (OUTPATIENT)
Age: 30
End: 2025-07-01
Payer: COMMERCIAL

## 2025-07-01 DIAGNOSIS — F43.10 POST TRAUMATIC STRESS DISORDER (PTSD): Primary | ICD-10-CM

## 2025-07-02 ENCOUNTER — HOSPITAL ENCOUNTER (OUTPATIENT)
Dept: SLEEP MEDICINE | Facility: HOSPITAL | Age: 30
Discharge: HOME | End: 2025-07-02
Admitting: NURSE PRACTITIONER
Payer: COMMERCIAL

## 2025-07-02 VITALS — HEIGHT: 62 IN | BODY MASS INDEX: 38.09 KG/M2 | WEIGHT: 207 LBS

## 2025-07-02 DIAGNOSIS — R06.89 GASPING FOR BREATH: ICD-10-CM

## 2025-07-02 DIAGNOSIS — R51.9 MORNING HEADACHE: ICD-10-CM

## 2025-07-02 DIAGNOSIS — R06.83 SNORING: ICD-10-CM

## 2025-07-02 DIAGNOSIS — G47.19 EXCESSIVE DAYTIME SLEEPINESS: ICD-10-CM

## 2025-07-02 DIAGNOSIS — E66.9 OBESITY (BMI 30-39.9): ICD-10-CM

## 2025-07-02 DIAGNOSIS — G47.30 OBSERVED SLEEP APNEA: ICD-10-CM

## 2025-07-02 PROCEDURE — 95800 SLP STDY UNATTENDED: CPT

## 2025-07-02 NOTE — PROGRESS NOTES
Progress Note     Date: 07/01/2025   Client Name: Elisabeth Martinez   MRN: [id number]   Start Time:    4:02 PM  End Time:    5 PM    Diagnoses and all orders for this visit:    1. Post traumatic stress disorder (PTSD) (Primary)        Active Symptoms/Chief Complainant:   Avoidance behaviors regarding trauma related stimuli, minimizing thought patterns, nightmares, sleep disturbance, disassociation, liable mood, anger/irritability, anxious thought patterns, depressed mood, difficulty regulating emotions, hypervigilance, catastrophizing thought patterns, flashbacks, negative self-talk, perfectionist tendencies, low self-worth         MENTAL STATUS EXAM   General Appearance:  Cleanly groomed and dressed  Eye Contact:  Good eye contact  Attitude:  Cooperative  Motor Activity:  Normal gait, posture  Speech:  Normal rate, tone, volume  Mood and affect:  Appropriate and mood congruent  Thought Process:  Logical and linear  Associations/ Thought Content:  No delusions  Hallucinations:  None  Suicidal Ideations:  Not present  Homicidal Ideation:  Not present  Sensorium:  Alert and clear  Orientation:  Person, place and time  Fund of Knowledge:  Appropriate for age and educational level  Insight:  Good  Judgement:  Good  Reliability:  Good       Risk to self:  Low  No new reported incidents of SI and/or self-harm    Risk to others:  Low  No new reported incidents of HI and/or aggressive behavior    Progress Note Intervention     Progress Note Intervention:     Met with client at [facility] for individual session.     Utilized MI techniques of OARS and providing empathy to explore current stressors and assess sx burden. Clinician continued to build rapport with client during session utilizing motivational interviewing and empathic listening.     Utilized CBT reflective listening techniques in order to process perfectionist tendencies and negative self-talk.  Reviewed client homework from last session to  identify cognitive distortions used in automatic thought patterns and processed this experience with client.  Prompted client to identify related thoughts, feelings, and behaviors using cognitive triangle model and explored connections between current stressors and trauma history.  Guided client through CBT cognitive restructuring exercise and discussed ways to practice this exercise in between sessions for homework.  Assisted client in reframing automatic negative thoughts and normalized emotions and trauma responses identified by client.      Client response:     Client was receptive to MI and CBT intervention. Client reported since last session with clinician practicing assigned homework from last session and reported noticing all or nothing thinking and mind reading as primary cognitive distortions used.  Client used cognitive triangle model by identifying related thoughts, feelings, and behaviors and was able to recognize connections between current stressors and trauma history.  Client actively participated in CBT cognitive restructuring exercise with clinician and discussed ways to practice this exercise between sessions for homework.  Client was able to reframe automatic negative thoughts with clinician and processed emotions and trauma responses with clinician.    Client engaged in active discussion with therapist and appeared receptive to therapeutic intervention/clinician feedback.       Follow-up:    At next session, clinician will continue CBT intervention.    Client reported making some progress on current care plan.  Ongoing treatment is still needed for this client due to goals not being fully realized or symptomology not being resolved.         [facility]

## 2025-07-07 RX ORDER — RIZATRIPTAN BENZOATE 10 MG/1
TABLET ORAL
Qty: 12 TABLET | Refills: 0 | Status: SHIPPED | OUTPATIENT
Start: 2025-07-07

## 2025-07-07 NOTE — TELEPHONE ENCOUNTER
Rx Refill Note  Requested Prescriptions     Pending Prescriptions Disp Refills    rizatriptan (MAXALT) 10 MG tablet [Pharmacy Med Name: RIZATRIPTAN 10 MG TABLET] 18 tablet      Sig: TAKE 1 TABLET BY MOUTH AT ONSET OF MIGRAINE; MAY REPEAT 1 TABLET IN 2 HOURS IF NEEDED.      Last filled: 2/12/25 #12 with 3 refills  Last office visit with prescribing clinician: 2/11/2025      Next office visit with prescribing clinician: 8/12/2025     Suhail Bruce MA  07/07/25, 08:14 EDT

## 2025-07-10 DIAGNOSIS — G47.33 OSA (OBSTRUCTIVE SLEEP APNEA): Primary | ICD-10-CM

## 2025-07-11 ENCOUNTER — TELEPHONE (OUTPATIENT)
Dept: SLEEP MEDICINE | Facility: CLINIC | Age: 30
End: 2025-07-11
Payer: COMMERCIAL

## 2025-07-11 NOTE — TELEPHONE ENCOUNTER
Spoke with patient over the phone regarding sleep study results, patient stated understanding and is agreeable to pap therapy. Order has been sent to [facility] 07/11/25 AB

## 2025-07-24 DIAGNOSIS — J30.2 SEASONAL ALLERGIES: ICD-10-CM

## 2025-07-24 DIAGNOSIS — F32.1 MODERATE MAJOR DEPRESSION: ICD-10-CM

## 2025-07-24 DIAGNOSIS — J45.20 MILD INTERMITTENT ASTHMA WITHOUT COMPLICATION: ICD-10-CM

## 2025-07-24 RX ORDER — VILAZODONE HYDROCHLORIDE 40 MG/1
40 TABLET ORAL DAILY
Qty: 90 TABLET | Refills: 1 | Status: SHIPPED | OUTPATIENT
Start: 2025-07-24

## 2025-07-24 RX ORDER — MONTELUKAST SODIUM 10 MG/1
10 TABLET ORAL NIGHTLY
Qty: 90 TABLET | Refills: 1 | Status: SHIPPED | OUTPATIENT
Start: 2025-07-24

## 2025-07-29 DIAGNOSIS — J30.2 SEASONAL ALLERGIES: ICD-10-CM

## 2025-07-29 RX ORDER — FLUTICASONE PROPIONATE 50 MCG
2 SPRAY, SUSPENSION (ML) NASAL DAILY
Qty: 99.9 G | Refills: 5 | Status: SHIPPED | OUTPATIENT
Start: 2025-07-29

## 2025-08-04 ENCOUNTER — TELEPHONE (OUTPATIENT)
Dept: NEUROLOGY | Facility: CLINIC | Age: 30
End: 2025-08-04
Payer: COMMERCIAL

## 2025-08-04 RX ORDER — FREMANEZUMAB-VFRM 225 MG/1.5ML
INJECTION SUBCUTANEOUS
Qty: 1.5 ML | Refills: 11 | OUTPATIENT
Start: 2025-08-04

## 2025-08-04 RX ORDER — RIZATRIPTAN BENZOATE 10 MG/1
10 TABLET ORAL ONCE AS NEEDED
Qty: 12 TABLET | Refills: 0 | Status: SHIPPED | OUTPATIENT
Start: 2025-08-04

## 2025-08-07 ENCOUNTER — OFFICE VISIT (OUTPATIENT)
Age: 30
End: 2025-08-07
Payer: COMMERCIAL

## 2025-08-07 DIAGNOSIS — F43.10 POST TRAUMATIC STRESS DISORDER (PTSD): Primary | ICD-10-CM

## 2025-08-12 ENCOUNTER — TELEPHONE (OUTPATIENT)
Dept: NEUROLOGY | Facility: CLINIC | Age: 30
End: 2025-08-12

## 2025-08-12 RX ORDER — FREMANEZUMAB-VFRM 225 MG/1.5ML
225 INJECTION SUBCUTANEOUS
Qty: 4.5 ML | Refills: 4 | Status: SHIPPED | OUTPATIENT
Start: 2025-08-12

## 2025-08-12 RX ORDER — RIZATRIPTAN BENZOATE 10 MG/1
10 TABLET ORAL ONCE AS NEEDED
Qty: 12 TABLET | Refills: 3 | Status: SHIPPED | OUTPATIENT
Start: 2025-08-12